# Patient Record
Sex: MALE | Race: ASIAN | Employment: FULL TIME | URBAN - METROPOLITAN AREA
[De-identification: names, ages, dates, MRNs, and addresses within clinical notes are randomized per-mention and may not be internally consistent; named-entity substitution may affect disease eponyms.]

---

## 2018-04-27 ENCOUNTER — HOSPITAL ENCOUNTER (OUTPATIENT)
Dept: GENERAL RADIOLOGY | Age: 43
Discharge: HOME | End: 2018-04-27
Payer: COMMERCIAL

## 2018-04-27 DIAGNOSIS — I10 ESSENTIAL HYPERTENSION: ICD-10-CM

## 2018-04-27 PROCEDURE — 71046 X-RAY EXAM CHEST 2 VIEWS: CPT

## 2019-10-11 ENCOUNTER — HOSPITAL ENCOUNTER (OUTPATIENT)
Dept: GENERAL RADIOLOGY | Age: 44
Discharge: HOME | End: 2019-10-11
Payer: COMMERCIAL

## 2019-10-11 DIAGNOSIS — I10 ESSENTIAL HYPERTENSION: ICD-10-CM

## 2019-10-11 PROCEDURE — 71046 X-RAY EXAM CHEST 2 VIEWS: CPT

## 2019-11-25 ENCOUNTER — OFFICE VISIT (OUTPATIENT)
Dept: FAMILY MEDICINE CLINIC | Age: 44
End: 2019-11-25

## 2019-11-25 VITALS
HEIGHT: 71 IN | OXYGEN SATURATION: 98 % | HEART RATE: 80 BPM | RESPIRATION RATE: 18 BRPM | SYSTOLIC BLOOD PRESSURE: 136 MMHG | DIASTOLIC BLOOD PRESSURE: 85 MMHG | WEIGHT: 196 LBS | BODY MASS INDEX: 27.44 KG/M2 | TEMPERATURE: 97.4 F

## 2019-11-25 DIAGNOSIS — G57.11 MERALGIA PARESTHETICA OF RIGHT SIDE: ICD-10-CM

## 2019-11-25 DIAGNOSIS — R73.03 PREDIABETES: ICD-10-CM

## 2019-11-25 DIAGNOSIS — Z76.89 ENCOUNTER TO ESTABLISH CARE WITH NEW DOCTOR: ICD-10-CM

## 2019-11-25 DIAGNOSIS — Z82.49 FAMILY HISTORY OF EARLY CAD: ICD-10-CM

## 2019-11-25 DIAGNOSIS — I10 ESSENTIAL HYPERTENSION: Primary | ICD-10-CM

## 2019-11-25 DIAGNOSIS — E66.3 OVER WEIGHT: ICD-10-CM

## 2019-11-25 RX ORDER — METFORMIN HYDROCHLORIDE 500 MG/1
500 TABLET ORAL
COMMUNITY
Start: 2019-08-29 | End: 2019-11-25 | Stop reason: SDUPTHER

## 2019-11-25 RX ORDER — METFORMIN HYDROCHLORIDE 500 MG/1
500 TABLET ORAL 2 TIMES DAILY WITH MEALS
Qty: 180 TAB | Refills: 1 | Status: SHIPPED | OUTPATIENT
Start: 2019-11-25 | End: 2020-02-28 | Stop reason: ALTCHOICE

## 2019-11-25 RX ORDER — LISINOPRIL 10 MG/1
TABLET ORAL
COMMUNITY
Start: 2019-11-08 | End: 2019-11-25 | Stop reason: SDUPTHER

## 2019-11-25 RX ORDER — LISINOPRIL 10 MG/1
10 TABLET ORAL DAILY
Qty: 90 TAB | Refills: 1 | Status: SHIPPED | OUTPATIENT
Start: 2019-11-25 | End: 2020-10-26

## 2019-11-25 NOTE — PROGRESS NOTES
Chief Complaint   Patient presents with    New Patient     Patient is in the office today to establish care with new provider. Patient also will need refills on current medications. 1. Have you been to the ER, urgent care clinic since your last visit? Hospitalized since your last visit? No    2. Have you seen or consulted any other health care providers outside of the [facility] since your last visit? Include any pap smears or colon screening.  No

## 2019-11-25 NOTE — PROGRESS NOTES
HISTORY OF PRESENT ILLNESS  Oriana Mello is a 40 y.o. male. He is a new patient and is here to establish care. Previous followed by Dr Julian Kay. The following sections were reviewed & updated as appropriate: PMH, PL, PSH, and SH. PMH significant for HTN, DM,family h/o CAD  Records from previous PCP were requested  He just had complete physical blood work done 2 months ago  HPI  Cardiovascular Review  The patient has hypertension and family h/o CAD. He reports taking medications as instructed, no medication side effects noted, no chest pain on exertion, no dyspnea on exertion, no swelling of ankles, no orthostatic dizziness or lightheadedness, no orthopnea or paroxysmal nocturnal dyspnea, no palpitations, no muscle aches or pain. Diet and Lifestyle: generally follows a low fat low cholesterol diet, generally follows a low sodium diet, exercises sporadically, nonsmoker. Lab review: no lab studies available for review at time of visit. He had [facility] stress test [facility] few months ago. Not sure about result   Medicines: Lisinopril 10 mg    Endocrine Review  He is seen for diabetes. Since last visit he reports: no polyuria or polydipsia, no chest pain, dyspnea or TIA's, no unusual visual symptoms, no hypoglycemia, no medication side effects noted. home sugar s not performed. He reports medication compliance: compliant all of the time. Medication side effects: none. Diabetic diet compliance: compliant all of the time. Lab review: no lab studies available for review at time of visit. He is on Metformin 500 mg daily    Review of Systems   Constitutional: Negative for chills, fever and malaise/fatigue. HENT: Negative for congestion, ear pain, sore throat and tinnitus. Eyes: Negative for blurred vision, double vision, pain and discharge. Respiratory: Negative for cough, shortness of breath and wheezing.     Cardiovascular: Negative for chest pain, palpitations and leg swelling. Gastrointestinal: Negative for abdominal pain, blood in stool, constipation, diarrhea, nausea and vomiting. Genitourinary: Negative for dysuria, frequency, hematuria and urgency. Musculoskeletal: Negative for back pain, joint pain and myalgias. Skin: Negative for rash. Neurological: Negative for dizziness, tremors, seizures and headaches. Numbness right thigh   Endo/Heme/Allergies: Negative for polydipsia. Does not bruise/bleed easily. Psychiatric/Behavioral: Negative for depression and substance abuse. The patient is not nervous/anxious. Physical Exam  Vitals signs and nursing note reviewed. Constitutional:       Appearance: He is well-developed. He is not diaphoretic. HENT:      Head: Normocephalic and atraumatic. Right Ear: External ear normal.      Mouth/Throat:      Pharynx: No oropharyngeal exudate. Eyes:      General: No scleral icterus. Conjunctiva/sclera: Conjunctivae normal.      Pupils: Pupils are equal, round, and reactive to light. Neck:      Musculoskeletal: Normal range of motion and neck supple. Thyroid: No thyromegaly. Vascular: No JVD. Cardiovascular:      Rate and Rhythm: Normal rate and regular rhythm. Heart sounds: Normal heart sounds. No murmur. Pulmonary:      Effort: Pulmonary effort is normal.      Breath sounds: Normal breath sounds. No wheezing. Abdominal:      General: Bowel sounds are normal. There is no distension. Palpations: Abdomen is soft. There is no mass. Musculoskeletal: Normal range of motion. General: No tenderness. Lymphadenopathy:      Cervical: No cervical adenopathy. Skin:     General: Skin is warm and dry. Findings: No rash. Neurological:      Mental Status: He is alert and oriented to person, place, and time. Cranial Nerves: No cranial nerve deficit. Deep Tendon Reflexes: Reflexes are normal and symmetric.  Reflexes normal.         ASSESSMENT and PLAN  Diagnoses and all orders for this visit:    1. Essential hypertension  -     lisinopril (PRINIVIL, ZESTRIL) 10 mg tablet; Take 1 Tab by mouth daily. 2. Family history of early CAD    3. Over weight  Assessment & Plan:  Discussed abnormal weight, ideal BMI and importance of diet and exercise to loose weight. Referral for exercise program was offered. Printed information about diet and lifestyle modification provided. Orders:  -     metFORMIN (GLUCOPHAGE) 500 mg tablet; Take 1 Tab by mouth two (2) times daily (with meals). 4. Prediabetes  -     metFORMIN (GLUCOPHAGE) 500 mg tablet; Take 1 Tab by mouth two (2) times daily (with meals). 5. Encounter to establish care with new doctor    6. Meralgia paresthetica of right side    Will get records from previous PCP  Refilled his meds  I have reviewed diabetes in detail with the patient today. All questions have been answered to his satisfaction. We have discussed the following concepts: diabetic diet discussed in detail, written exchange diet given, low cholesterol diet, weight control and daily exercise discussed, home glucose monitoring emphasized, all medications, side effects and compliance discussed carefully and annual eye examinations at Ophthalmology discussed. Discussed lifestyle issues and health guidance given  Patient was given an after visit summary which includes diagnoses, vital signs, current medications, instructions and references & authorized prescriptions . Results of labs will be conveyed to patient, once available. Pt verbalized instructions I provided and expressed understanding of discussion that was held today. Follow-up and Dispositions    · Return in about 3 months (around 2/25/2020) for fasting, follow up.

## 2019-11-25 NOTE — PATIENT INSTRUCTIONS

## 2020-02-26 ENCOUNTER — OFFICE VISIT (OUTPATIENT)
Dept: FAMILY MEDICINE CLINIC | Age: 45
End: 2020-02-26

## 2020-02-26 VITALS
HEART RATE: 73 BPM | HEIGHT: 71 IN | RESPIRATION RATE: 16 BRPM | SYSTOLIC BLOOD PRESSURE: 120 MMHG | WEIGHT: 192 LBS | OXYGEN SATURATION: 99 % | DIASTOLIC BLOOD PRESSURE: 70 MMHG | BODY MASS INDEX: 26.88 KG/M2 | TEMPERATURE: 98.8 F

## 2020-02-26 DIAGNOSIS — E66.3 OVER WEIGHT: ICD-10-CM

## 2020-02-26 DIAGNOSIS — I10 ESSENTIAL HYPERTENSION: ICD-10-CM

## 2020-02-26 DIAGNOSIS — E11.9 CONTROLLED TYPE 2 DIABETES MELLITUS WITHOUT COMPLICATION, WITHOUT LONG-TERM CURRENT USE OF INSULIN (HCC): Primary | ICD-10-CM

## 2020-02-26 DIAGNOSIS — Z82.49 FAMILY HISTORY OF EARLY CAD: ICD-10-CM

## 2020-02-26 DIAGNOSIS — E78.5 DYSLIPIDEMIA, GOAL LDL BELOW 100: ICD-10-CM

## 2020-02-26 NOTE — PROGRESS NOTES
HISTORY OF PRESENT ILLNESS  Caren Gitelman is a 40 y.o. male. Seen for 3 months follow up on his visit to establish care . Meanwhile records from previous PCP were received and reviewed . H/o DM, HTN,dyslipidemia. His last A1C was 7.0 in 10/2019  Strong family h/o CAD . Had stress echo with [facility]  Endocrine Review  He is seen for diabetes. Since last visit he reports: no polyuria or polydipsia, no chest pain, dyspnea or TIA's, no numbness, tingling or pain in extremities, no unusual visual symptoms, no hypoglycemia, no medication side effects noted, weight has decreased, no significant changes. Testing: is not performed. He reports medication compliance: taking Metformin one time only. Medication side effects: none. Diabetic diet compliance: compliant all of the time. Lab review: records from previous PCP showing A1C of 7.0. Cardiovascular Review  The patient has hypertension, hyperlipidemia and Family h/o CAD. He reports taking medications as instructed, no medication side effects noted, patient does not perform home BP monitoring, no chest pain on exertion, no dyspnea on exertion, no orthostatic dizziness or lightheadedness, no orthopnea or paroxysmal nocturnal dyspnea, no palpitations, no intermittent claudication symptoms. Diet and Lifestyle: generally follows a low fat low cholesterol diet, generally follows a low sodium diet, exercises regularly, nonsmoker. Lab review: no lab studies available for review at time of visit. Medicines: Lisinopril 10 mg        Review of Systems   Constitutional: Negative for chills, fever and malaise/fatigue. HENT: Negative for congestion, ear pain, sore throat and tinnitus. Eyes: Negative for blurred vision, double vision, pain and discharge. Respiratory: Negative for cough, shortness of breath and wheezing. Cardiovascular: Negative for chest pain, palpitations and leg swelling.    Gastrointestinal: Negative for abdominal pain, blood in stool, constipation, diarrhea, nausea and vomiting. Genitourinary: Negative for dysuria, frequency, hematuria and urgency. Musculoskeletal: Negative for back pain, joint pain and myalgias. Skin: Negative for rash. Neurological: Negative for dizziness, tremors, seizures and headaches. Endo/Heme/Allergies: Negative for polydipsia. Does not bruise/bleed easily. Psychiatric/Behavioral: Negative for depression and substance abuse. The patient is not nervous/anxious. Physical Exam  Vitals signs and nursing note reviewed. Constitutional:       Appearance: He is well-developed. He is not diaphoretic. HENT:      Head: Normocephalic and atraumatic. Right Ear: External ear normal.      Mouth/Throat:      Pharynx: No oropharyngeal exudate. Eyes:      General: No scleral icterus. Conjunctiva/sclera: Conjunctivae normal.      Pupils: Pupils are equal, round, and reactive to light. Neck:      Musculoskeletal: Normal range of motion and neck supple. Thyroid: No thyromegaly. Vascular: No JVD. Cardiovascular:      Rate and Rhythm: Normal rate and regular rhythm. Heart sounds: Normal heart sounds. No murmur. Pulmonary:      Effort: Pulmonary effort is normal.      Breath sounds: Normal breath sounds. No wheezing. Abdominal:      General: Bowel sounds are normal. There is no distension. Palpations: Abdomen is soft. There is no mass. Musculoskeletal: Normal range of motion. General: No tenderness. Comments: Feet:warm, good capillary refill, no trophic changes or ulcerative lesions, normal DP and PT pulses and normal sensory exam     Lymphadenopathy:      Cervical: No cervical adenopathy. Skin:     General: Skin is warm and dry. Findings: No rash. Neurological:      Mental Status: He is alert and oriented to person, place, and time. Cranial Nerves: No cranial nerve deficit.       Deep Tendon Reflexes: Reflexes are normal and symmetric. Reflexes normal.         ASSESSMENT and PLAN  Diagnoses and all orders for this visit:    1. Controlled type 2 diabetes mellitus without complication, without long-term current use of insulin ([facility] [address]  Assessment & Plan:  Well Controlled, based on history, physical exam and review of pertinent labs, studies and medications; meds reconciled; continue current treatment plan, lifestyle modifications recommended, medication compliance emphasized. Key Antihyperglycemic Medications             metFORMIN (GLUCOPHAGE) 500 mg tablet (Taking) Take 1 Tab by mouth two (2) times daily (with meals). Other Key Diabetic Medications             lisinopril (PRINIVIL, ZESTRIL) 10 mg tablet (Taking) Take 1 Tab by mouth daily. No results found for: HBA1C, [id number], [id number], GLU, CREA, CREAPOC, CREATEXT, MALBUR, MCALPOCT, MCACRPOC, MALBCRRATEXT, MCACR, MCAU, MCAU2, MALBEXT, CHOL, CHOLPOCT, HDL, HDLPOC, LDLC, LDL, LDLCEXT, LDLCPOC, TRIGL, TGLPOCT, [id number]  Diabetic Foot and Eye Exam  Status   Topic Date Due    Diabetic Foot Care  06/20/1985    Eye Exam  06/20/1985       Orders:  -      DIABETES FOOT EXAM  -     METABOLIC PANEL, COMPREHENSIVE  -     HEMOGLOBIN A1C WITH EAG  -     MICROALBUMIN, UR, RAND W/ MICROALB/CREAT RATIO  -     REFERRAL TO OPHTHALMOLOGY    2. Essential hypertension  -     METABOLIC PANEL, COMPREHENSIVE  -     MICROALBUMIN, UR, RAND W/ MICROALB/CREAT RATIO    3. Family history of early CAD    3. Over weight  Assessment & Plan:  Discussed abnormal weight, ideal BMI and importance of diet and exercise to loose weight. Referral for exercise program was offered. Printed information about diet and lifestyle modification provided.          5. Dyslipidemia, goal LDL below 787  -     METABOLIC PANEL, COMPREHENSIVE  -     LIPID PANEL      Discussed lifestyle issues and health guidance given  Patient was given an after visit summary which includes diagnoses, vital signs, current medications, instructions and references & authorized prescriptions . Results of labs will be conveyed to patient, once available. Pt verbalized instructions I provided and expressed understanding of discussion that was held today. Follow-up and Dispositions    · Return in about 3 months (around 5/26/2020) for fasting, follow up.

## 2020-02-26 NOTE — PATIENT INSTRUCTIONS
Counting Carbohydrates: Care Instructions Your Care Instructions You don't have to eat special foods when you have diabetes. You just have to be careful to eat healthy foods. Carbohydrates (carbs) raise blood sugar higher and quicker than any other nutrient. Carbs are found in desserts, breads and cereals, and fruit. They're also in starchy vegetables. These include potatoes, corn, and grains such as rice and pasta. Carbs are also in milk and yogurt. The more carbs you eat at one time, the higher your blood sugar will rise. Spreading carbs all through the day helps keep your blood sugar levels within your target range. Counting carbs is one of the best ways to keep your blood sugar under control. If you use insulin, counting carbs helps you match the right amount of insulin to the number of grams of carbs in a meal. Then you can change your diet and insulin dose as needed. Testing your blood sugar several times a day can help you learn how carbs affect your blood sugar. A registered dietitian or certified diabetes educator can help you plan meals and snacks. Follow-up care is a key part of your treatment and safety. Be sure to make and go to all appointments, and call your doctor if you are having problems. It's also a good idea to know your test results and keep a list of the medicines you take. How can you care for yourself at home? Know your daily amount of carbohydrates Your daily amount depends on several things, such as your weight, how active you are, which diabetes medicines you take, and what your goals are for your blood sugar levels. A registered dietitian or certified diabetes educator can help you plan how many carbs to include in each meal and snack. For most adults, a guideline for the daily amount of carbs is: · 45 to 60 grams at each meal. That's about the same as 3 to 4 carbohydrate servings. · 15 to 20 grams at each snack. That's about the same as 1 carbohydrate serving. Count carbs Counting carbs lets you know how much rapid-acting insulin to take before you eat. If you use an insulin pump, you get a constant rate of insulin during the day. So the pump must be programmed at meals. This gives you extra insulin to cover the rise in blood sugar after meals. If you take insulin: 
· Learn your own insulin-to-carb ratio. You and your diabetes health professional will figure out the ratio. You can do this by testing your blood sugar after meals. For example, you may need a certain amount of insulin for every 15 grams of carbs. · Add up the carb grams in a meal. Then you can figure out how many units of insulin to take based on your insulin-to-carb ratio. · Exercise lowers blood sugar. You can use less insulin than you would if you were not doing exercise. Keep in mind that timing matters. If you exercise within 1 hour after a meal, your body may need less insulin for that meal than it would if you exercised 3 hours after the meal. Test your blood sugar to find out how exercise affects your need for insulin. If you do or don't take insulin: 
· Look at labels on packaged foods. This can tell you how many carbs are in a serving. You can also use guides from the American Diabetes Association. · Be aware of portions, or serving sizes. If a package has two servings and you eat the whole package, you need to double the number of grams of carbohydrate listed for one serving. · Protein, fat, and fiber do not raise blood sugar as much as carbs do. If you eat a lot of these nutrients in a meal, your blood sugar will rise more slowly than it would otherwise. Eat from all food groups · Eat at least three meals a day. · Plan meals to include food from all the food groups. The food groups include grains, fruits, dairy, proteins, and vegetables. · Talk to your dietitian or diabetes educator about ways to add limited amounts of sweets into your meal plan. · If you drink alcohol, talk to your doctor. It may not be recommended when you are taking certain diabetes medicines. Where can you learn more? Go to http://simeon-gibran.info/. Enter O736 in the search box to learn more about \"Counting Carbohydrates: Care Instructions. \" Current as of: April 16, 2019 Content Version: 12.2 © 4867-4117 [employer]. Care instructions adapted under license by [employer] (which disclaims liability or warranty for this information). If you have questions about a medical condition or this instruction, always ask your healthcare professional. [place] [address] any warranty or liability for your use of this information.

## 2020-02-26 NOTE — ASSESSMENT & PLAN NOTE
Well Controlled, based on history, physical exam and review of pertinent labs, studies and medications; meds reconciled; continue current treatment plan, lifestyle modifications recommended, medication compliance emphasized. Key Antihyperglycemic Medications             metFORMIN (GLUCOPHAGE) 500 mg tablet (Taking) Take 1 Tab by mouth two (2) times daily (with meals). Other Key Diabetic Medications             lisinopril (PRINIVIL, ZESTRIL) 10 mg tablet (Taking) Take 1 Tab by mouth daily.         No results found for: HBA1C, [id number], [id number], GLU, CREA, CREAPOC, CREATEXT, MALBUR, MCALPOCT, MCACRPOC, MALBCRRATEXT, MCACR, MCAU, MCAU2, MALBEXT, CHOL, CHOLPOCT, HDL, HDLPOC, LDLC, LDL, LDLCEXT, LDLCPOC, TRIGL, TGLPOCT, [id number]  Diabetic Foot and Eye Exam HM Status   Topic Date Due    Diabetic Foot Care  06/20/1985    Eye Exam  06/20/1985

## 2020-02-26 NOTE — PROGRESS NOTES
Chief Complaint   Patient presents with    Hypertension     follow up      1. Have you been to the ER, urgent care clinic since your last visit? Hospitalized since your last visit? No    2. Have you seen or consulted any other health care providers outside of the [address] [place] [address] since your last visit? Include any pap smears or colon screening.  No

## 2020-02-27 LAB
ALBUMIN SERPL-MCNC: 4.8 G/DL (ref 4–5)
ALBUMIN/CREAT UR: 9 MG/G CREAT (ref 0–29)
ALBUMIN/GLOB SERPL: 1.8 {RATIO} (ref 1.2–2.2)
ALP SERPL-CCNC: 71 IU/L (ref 39–117)
ALT SERPL-CCNC: 22 IU/L (ref 0–44)
AST SERPL-CCNC: 17 IU/L (ref 0–40)
BILIRUB SERPL-MCNC: 0.9 MG/DL (ref 0–1.2)
BUN SERPL-MCNC: 13 MG/DL (ref 6–24)
BUN/CREAT SERPL: 16 (ref 9–20)
CALCIUM SERPL-MCNC: 9.2 MG/DL (ref 8.7–10.2)
CHLORIDE SERPL-SCNC: 99 MMOL/L (ref 96–106)
CHOLEST SERPL-MCNC: 200 MG/DL (ref 100–199)
CO2 SERPL-SCNC: 25 MMOL/L (ref 20–29)
CREAT SERPL-MCNC: 0.83 MG/DL (ref 0.76–1.27)
CREAT UR-MCNC: 174.6 MG/DL
EST. AVERAGE GLUCOSE BLD GHB EST-MCNC: 243 MG/DL
GLOBULIN SER CALC-MCNC: 2.7 G/DL (ref 1.5–4.5)
GLUCOSE SERPL-MCNC: 201 MG/DL (ref 65–99)
HBA1C MFR BLD: 10.1 % (ref 4.8–5.6)
HDLC SERPL-MCNC: 47 MG/DL
INTERPRETATION, 910389: NORMAL
LDLC SERPL CALC-MCNC: 121 MG/DL (ref 0–99)
MICROALBUMIN UR-MCNC: 15.8 UG/ML
POTASSIUM SERPL-SCNC: 3.9 MMOL/L (ref 3.5–5.2)
PROT SERPL-MCNC: 7.5 G/DL (ref 6–8.5)
SODIUM SERPL-SCNC: 140 MMOL/L (ref 134–144)
TRIGL SERPL-MCNC: 162 MG/DL (ref 0–149)
VLDLC SERPL CALC-MCNC: 32 MG/DL (ref 5–40)

## 2020-02-28 DIAGNOSIS — E11.9 CONTROLLED TYPE 2 DIABETES MELLITUS WITHOUT COMPLICATION, WITHOUT LONG-TERM CURRENT USE OF INSULIN (HCC): Primary | ICD-10-CM

## 2020-02-28 DIAGNOSIS — E78.5 DYSLIPIDEMIA, GOAL LDL BELOW 100: ICD-10-CM

## 2020-02-28 DIAGNOSIS — Z82.49 FAMILY HISTORY OF EARLY CAD: ICD-10-CM

## 2020-02-28 RX ORDER — ATORVASTATIN CALCIUM 10 MG/1
10 TABLET, FILM COATED ORAL
Qty: 30 TAB | Refills: 2 | Status: SHIPPED | OUTPATIENT
Start: 2020-02-28 | End: 2020-06-11

## 2020-02-28 NOTE — PROGRESS NOTES
Please inform patient,  A1C is very high ( 10.1) and average sugar in 200s, c/w uncontrolled diabetes. I am changing his Metformin to Janumet and to take strictly two times . Need to follow up in a month to check on number again  Cholesterol results also high , , goal for him is < 100 due to diabetes and strong family history of CAD.  Will start on low dose of statin,to take at night  Urine is negative for protein  Please schedule him for 1 month follow up on diabetes  Rx sent to pharmacy  thanks

## 2020-03-02 NOTE — PROGRESS NOTES
Call placed to patient. Name and  verified. Reviewed recent lab results with patient. Advised to stop metformin and start Janumet. Advised new RX for statin called to pharmacy.  Scheduled one month follow up

## 2020-03-04 ENCOUNTER — DOCUMENTATION ONLY (OUTPATIENT)
Dept: FAMILY MEDICINE CLINIC | Age: 45
End: 2020-03-04

## 2020-03-05 ENCOUNTER — DOCUMENTATION ONLY (OUTPATIENT)
Dept: FAMILY MEDICINE CLINIC | Age: 45
End: 2020-03-05

## 2020-03-06 DIAGNOSIS — E11.9 CONTROLLED TYPE 2 DIABETES MELLITUS WITHOUT COMPLICATION, WITHOUT LONG-TERM CURRENT USE OF INSULIN (HCC): Primary | ICD-10-CM

## 2020-03-06 NOTE — PROGRESS NOTES
Received denial for Janumet from insurance, requesting to change to preferred one either Jentadueto or Rex Marie

## 2020-03-24 ENCOUNTER — TELEPHONE (OUTPATIENT)
Dept: FAMILY MEDICINE CLINIC | Age: 45
End: 2020-03-24

## 2020-03-24 DIAGNOSIS — E11.9 CONTROLLED TYPE 2 DIABETES MELLITUS WITHOUT COMPLICATION, WITHOUT LONG-TERM CURRENT USE OF INSULIN (HCC): Primary | ICD-10-CM

## 2020-03-24 RX ORDER — METFORMIN HYDROCHLORIDE 1000 MG/1
1000 TABLET ORAL 2 TIMES DAILY WITH MEALS
Qty: 60 TAB | Refills: 3 | Status: SHIPPED | OUTPATIENT
Start: 2020-03-24 | End: 2020-05-04 | Stop reason: SDUPTHER

## 2020-03-24 NOTE — TELEPHONE ENCOUNTER
Outbound call to patient. Name and  verified. Advised patient medication was sent as separate prescriptions he was appreciative of call

## 2020-03-24 NOTE — TELEPHONE ENCOUNTER
Pt is calling to check if Dr. Pam Gunn can change his current medication linagliptin-metFORMIN (Anastasia Jonatan) 2.5-1,000 mg per tablet to a generic option as his current insurance is not covering this very well. [facility]:  [phone]

## 2020-04-13 ENCOUNTER — TELEPHONE (OUTPATIENT)
Dept: FAMILY MEDICINE CLINIC | Age: 45
End: 2020-04-13

## 2020-04-13 DIAGNOSIS — E11.9 CONTROLLED TYPE 2 DIABETES MELLITUS WITHOUT COMPLICATION, WITHOUT LONG-TERM CURRENT USE OF INSULIN (HCC): Primary | ICD-10-CM

## 2020-04-13 RX ORDER — GLIPIZIDE 5 MG/1
5 TABLET ORAL
Qty: 60 TAB | Refills: 4 | Status: SHIPPED | OUTPATIENT
Start: 2020-04-13 | End: 2020-11-30 | Stop reason: ALTCHOICE

## 2020-04-13 NOTE — TELEPHONE ENCOUNTER
Was changed to [facility]) as per insurance recommendation. Will switch to Glipizide ,to take before meals. thanks

## 2020-04-13 NOTE — TELEPHONE ENCOUNTER
Patient is asking for a cheaper alternative  for medication Tradjenta 5mg that was sent to [facility] on 3/24/2020 #30 with 3 refill 1 tab daily with breakfast. Patient states cost $450. Please review and send a new prescription to [facility] on file if appropriate. Thank you.

## 2020-04-21 ENCOUNTER — TELEPHONE (OUTPATIENT)
Dept: FAMILY MEDICINE CLINIC | Age: 45
End: 2020-04-21

## 2020-04-21 NOTE — TELEPHONE ENCOUNTER
Chief Complaint Patient presents with  Prior [employer] JANUVIA 25 MG TABLET COVERMYMEDS RESPONSE:  Your request is pending review. You may contact the Prior Authorization Department at [phone] for further questions. Penny Newman, TANESHA Contacted prior authorization department, medication Denied. Penny Newman LPN In reviewing patient's record Tradjenta medication changed due to insurance not covering or high copay. Ayla Mccallum, TANESHA]

## 2020-05-04 ENCOUNTER — VIRTUAL VISIT (OUTPATIENT)
Dept: FAMILY MEDICINE CLINIC | Age: 45
End: 2020-05-04

## 2020-05-04 VITALS — BODY MASS INDEX: 26.78 KG/M2 | HEIGHT: 71 IN

## 2020-05-04 DIAGNOSIS — Z82.49 FAMILY HISTORY OF EARLY CAD: Primary | ICD-10-CM

## 2020-05-04 DIAGNOSIS — E11.9 CONTROLLED TYPE 2 DIABETES MELLITUS WITHOUT COMPLICATION, WITHOUT LONG-TERM CURRENT USE OF INSULIN (HCC): ICD-10-CM

## 2020-05-04 DIAGNOSIS — E78.5 DYSLIPIDEMIA, GOAL LDL BELOW 100: ICD-10-CM

## 2020-05-04 RX ORDER — METFORMIN HYDROCHLORIDE 1000 MG/1
1000 TABLET ORAL 2 TIMES DAILY WITH MEALS
Qty: 60 TAB | Refills: 3 | Status: SHIPPED | OUTPATIENT
Start: 2020-05-04 | End: 2021-05-07

## 2020-05-04 NOTE — PROGRESS NOTES
Elke Gregory is a 40 y.o. male who was seen by synchronous (real-time) audio-video technology on 5/4/2020. Consent: Elke Gregory, who was seen by synchronous (real-time) audio-video technology, and/or his healthcare decision maker, is aware that this patient-initiated, Telehealth encounter on 5/4/2020 is a billable service, with coverage as determined by his insurance carrier. He is aware that he may receive a bill and has provided verbal consent to proceed: Yes. Assessment & Plan:   Diagnoses and all orders for this visit:    1. Family history of early CAD  -     LIPID PANEL    2. Controlled type 2 diabetes mellitus without complication, without long-term current use of insulin (HCC)  -     metFORMIN (GLUCOPHAGE) 1,000 mg tablet; Take 1 Tab by mouth two (2) times daily (with meals). -     METABOLIC PANEL, COMPREHENSIVE  -     HEMOGLOBIN A1C WITH EAG  -     LIPID PANEL    3. Dyslipidemia, goal LDL below 777  -     METABOLIC PANEL, COMPREHENSIVE  -     LIPID PANEL      The complexity of medical decision making for this visit is moderate       I spent at least 40 minutes on this visit with this established patient. ([id number])    I have reviewed diabetes in detail with the patient today. All questions have been answered to his satisfaction. We have discussed the following concepts: diabetic diet discussed in detail, written exchange diet given, low cholesterol diet, weight control and daily exercise discussed, home glucose monitoring emphasized, all medications, side effects and compliance discussed carefully, foot care discussed and Podiatry visits discussed, long term diabetic complications discussed and labs immediately prior to next visit. Subjective:   Elke Gregory is a 40 y.o. male who was seen for Follow-up (1 month)      Endocrine Review  He is seen for diabetes.   Since last visit he reports: no polyuria or polydipsia, no chest pain, dyspnea or TIA's, no numbness, tingling or pain in extremities, no unusual visual symptoms, no hypoglycemia, no medication side effects noted, weight has decreased, no significant changes. Testing: is not performed. He reports medication compliance: taking Metformin one time only. Medication side effects: none. Diabetic diet compliance: compliant all of the time. Lab review:   Lab Results   Component Value Date/Time    Hemoglobin A1c 10.1 (H) 02/26/2020 09:28 AM      His Metformin dose was changed to 1 gm bid and Glipizide 5 mg bid was added. He is still taking Metformin 500 mg bid and Glipizide one time. Insurance has declined most of other medicines so far.         Cardiovascular Review  The patient has hypertension, hyperlipidemia and Family h/o CAD. He reports taking medications as instructed, no medication side effects noted, patient does not perform home BP monitoring, no chest pain on exertion, no dyspnea on exertion, no orthostatic dizziness or lightheadedness, no orthopnea or paroxysmal nocturnal dyspnea, no palpitations, no intermittent claudication symptoms. Diet and Lifestyle: generally follows a low fat low cholesterol diet, generally follows a low sodium diet, exercises regularly, nonsmoker. Lab review: no lab studies available for review at time of visit. Medicines: Lisinopril 10 mg and Atorvastatin 10 mg was added on last visit    Prior to Admission medications    Medication Sig Start Date End Date Taking? Authorizing Provider   metFORMIN (GLUCOPHAGE) 1,000 mg tablet Take 1 Tab by mouth two (2) times daily (with meals). 5/4/20  Yes Misha Hidalgo MD   glipiZIDE (GLUCOTROL) 5 mg tablet Take 1 Tab by mouth Before breakfast and dinner. Patient taking differently: Take 5 mg by mouth daily. 4/13/20  Yes Misha Hidalgo MD   atorvastatin (LIPITOR) 10 mg tablet Take 1 Tab by mouth nightly. 2/28/20  Yes Misha Hidalgo MD   lisinopril (PRINIVIL, ZESTRIL) 10 mg tablet Take 1 Tab by mouth daily.  11/25/19  Yes Misha Hidalgo MD     No Known Allergies    Patient Active Problem List    Diagnosis Date Noted    Controlled type 2 diabetes mellitus without complication, without long-term current use of insulin ([facility] 11/25/2019    Encounter to establish care with new doctor 11/25/2019    Over weight 11/25/2019    Family history of early CAD 11/25/2019    Meralgia paresthetica of right side 11/25/2019     Current Outpatient Medications   Medication Sig Dispense Refill    metFORMIN (GLUCOPHAGE) 1,000 mg tablet Take 1 Tab by mouth two (2) times daily (with meals). 60 Tab 3    glipiZIDE (GLUCOTROL) 5 mg tablet Take 1 Tab by mouth Before breakfast and dinner. (Patient taking differently: Take 5 mg by mouth daily.) 60 Tab 4    atorvastatin (LIPITOR) 10 mg tablet Take 1 Tab by mouth nightly. 30 Tab 2    lisinopril (PRINIVIL, ZESTRIL) 10 mg tablet Take 1 Tab by mouth daily. 90 Tab 1     No Known Allergies  Past Medical History:   Diagnosis Date    Diabetes ([facility]     Hypertension      History reviewed. No pertinent surgical history. Family History   Problem Relation Age of Onset    Diabetes Father     Heart Disease Father     Heart Disease Mother        Review of Systems   Constitutional: Negative for chills, fever and malaise/fatigue. HENT: Negative for congestion, ear pain, sore throat and tinnitus. Eyes: Negative for blurred vision, double vision, pain and discharge. Respiratory: Negative for cough, shortness of breath and wheezing. Cardiovascular: Negative for chest pain, palpitations and leg swelling. Gastrointestinal: Negative for abdominal pain, blood in stool, constipation, diarrhea, nausea and vomiting. Genitourinary: Negative for dysuria, frequency, hematuria and urgency. Musculoskeletal: Negative for back pain, joint pain and myalgias. Skin: Negative for rash. Neurological: Negative for dizziness, tremors, seizures and headaches. Endo/Heme/Allergies: Negative for polydipsia. Does not bruise/bleed easily. Psychiatric/Behavioral: Negative for depression and substance abuse. The patient is not nervous/anxious. Objective:   Vital Signs: (As obtained by patient/caregiver at home)  Visit Vitals  Ht 5' 11\" (1.803 m)   BMI 26.78 kg/m²        [INSTRUCTIONS:  \"[x]\" Indicates a positive item  \"[]\" Indicates a negative item  -- DELETE ALL ITEMS NOT EXAMINED]    Constitutional: [x] Appears well-developed and well-nourished [x] No apparent distress      [] Abnormal -     Mental status: [x] Alert and awake  [x] Oriented to person/place/time [x] Able to follow commands    [] Abnormal -     Eyes:   EOM    [x]  Normal    [] Abnormal -   Sclera  [x]  Normal    [] Abnormal -          Discharge [x]  None visible   [] Abnormal -     HENT: [x] Normocephalic, atraumatic  [] Abnormal -   [x] Mouth/Throat: Mucous membranes are moist    External Ears [x] Normal  [] Abnormal -    Neck: [x] No visualized mass [] Abnormal -     Pulmonary/Chest: [x] Respiratory effort normal   [x] No visualized signs of difficulty breathing or respiratory distress        [] Abnormal -      Musculoskeletal:   [x] Normal gait with no signs of ataxia         [x] Normal range of motion of neck        [] Abnormal -     Neurological:        [x] No Facial Asymmetry (Cranial nerve 7 motor function) (limited exam due to video visit)          [x] No gaze palsy        [] Abnormal -          Skin:        [x] No significant exanthematous lesions or discoloration noted on facial skin         [] Abnormal -            Psychiatric:       [x] Normal Affect [] Abnormal -        [x] No Hallucinations    Other pertinent observable physical exam findings:-        We discussed the expected course, resolution and complications of the diagnosis(es) in detail. Medication risks, benefits, costs, interactions, and alternatives were discussed as indicated. I advised him to contact the office if his condition worsens, changes or fails to improve as anticipated.  He expressed understanding with the diagnosis(es) and plan. Sumaya Norton is a 40 y.o. male who was evaluated by a video visit encounter for concerns as above. Patient identification was verified prior to start of the visit. A caregiver was present when appropriate. Due to this being a TeleHealth encounter (During NOYEW-14 public health emergency), evaluation of the following organ systems was limited: Vitals/Constitutional/EENT/Resp/CV/GI//MS/Neuro/Skin/Heme-Lymph-Imm. Pursuant to the emergency declaration under the [facility], [facility]5 waiver authority and the [employer] Act, this Virtual  Visit was conducted, with patient's (and/or legal guardian's) consent, to reduce the patient's risk of exposure to COVID-19 and provide necessary medical care. Services were provided through a video synchronous discussion virtually to substitute for in-person clinic visit. This service was provided through telehealth, between patient Sumaya Norton participating from home and Keshawn العلي MD from [facility]   .       Akiko Olivier MD

## 2020-05-04 NOTE — PROGRESS NOTES
Chief Complaint   Patient presents with    Follow-up     1 month     1. Have you been to the ER, urgent care clinic since your last visit? Hospitalized since your last visit? No    2. Have you seen or consulted any other health care providers outside of the [address] [place] [address] since your last visit? Include any pap smears or colon screening.  No

## 2020-05-11 ENCOUNTER — TELEPHONE (OUTPATIENT)
Dept: FAMILY MEDICINE CLINIC | Age: 45
End: 2020-05-11

## 2020-05-14 ENCOUNTER — TELEPHONE (OUTPATIENT)
Dept: FAMILY MEDICINE CLINIC | Age: 45
End: 2020-05-14

## 2020-05-14 NOTE — TELEPHONE ENCOUNTER
Called, spoke to pt. Two pt identifiers confirmed. Writer informed patient that his med Cristiano Mcginnis was not approval but we have a discount card that we can send to him and patient stated that I could. Sarah Mejia was DENIED for [facility] and fax to Pharmacy

## 2020-05-20 ENCOUNTER — TELEPHONE (OUTPATIENT)
Dept: FAMILY MEDICINE CLINIC | Age: 45
End: 2020-05-20

## 2020-05-20 NOTE — TELEPHONE ENCOUNTER
Outbound call placed to patient. No answer. Left message for patient to give us a call back regarding upcoming appointment. Patients appointment needs to be rescheduled or converted to Virtual visit if patient is willing due to COVID-19 pandemic.    PSR can reschedule appt as virtual

## 2020-06-04 NOTE — TELEPHONE ENCOUNTER
MD Shania Corrigan, Not sure what is current therapy for this patient. Not on current med list. Per note, we were sending a discount card to patient for [facility]. Pharmacy has run rx again with rejection per insurance. Attached the rx if needed and appropriate. Thanks, Edie Francisco Requested Prescriptions Pending Prescriptions Disp Refills  SITagliptin (JANUVIA) 25 mg tablet 30 Tab 5 Sig: Take 1 Tab by mouth daily (with breakfast).

## 2020-06-11 DIAGNOSIS — E78.5 DYSLIPIDEMIA, GOAL LDL BELOW 100: ICD-10-CM

## 2020-06-11 DIAGNOSIS — Z82.49 FAMILY HISTORY OF EARLY CAD: ICD-10-CM

## 2020-06-11 RX ORDER — ATORVASTATIN CALCIUM 10 MG/1
TABLET, FILM COATED ORAL
Qty: 30 TAB | Refills: 1 | Status: SHIPPED | OUTPATIENT
Start: 2020-06-11 | End: 2021-07-02

## 2020-10-26 DIAGNOSIS — I10 ESSENTIAL HYPERTENSION: ICD-10-CM

## 2020-10-26 RX ORDER — LISINOPRIL 10 MG/1
TABLET ORAL
Qty: 90 TAB | Refills: 0 | Status: SHIPPED | OUTPATIENT
Start: 2020-10-26 | End: 2021-02-01

## 2020-11-30 ENCOUNTER — OFFICE VISIT (OUTPATIENT)
Dept: FAMILY MEDICINE CLINIC | Age: 45
End: 2020-11-30
Payer: COMMERCIAL

## 2020-11-30 VITALS
HEIGHT: 71 IN | TEMPERATURE: 97.8 F | SYSTOLIC BLOOD PRESSURE: 128 MMHG | BODY MASS INDEX: 28.42 KG/M2 | DIASTOLIC BLOOD PRESSURE: 84 MMHG | OXYGEN SATURATION: 97 % | HEART RATE: 83 BPM | WEIGHT: 203 LBS | RESPIRATION RATE: 16 BRPM

## 2020-11-30 DIAGNOSIS — E11.9 CONTROLLED TYPE 2 DIABETES MELLITUS WITHOUT COMPLICATION, WITHOUT LONG-TERM CURRENT USE OF INSULIN (HCC): ICD-10-CM

## 2020-11-30 DIAGNOSIS — Z00.00 ROUTINE GENERAL MEDICAL EXAMINATION AT A HEALTH CARE FACILITY: Primary | ICD-10-CM

## 2020-11-30 DIAGNOSIS — Z23 ENCOUNTER FOR IMMUNIZATION: ICD-10-CM

## 2020-11-30 DIAGNOSIS — Z82.49 FAMILY HISTORY OF EARLY CAD: ICD-10-CM

## 2020-11-30 DIAGNOSIS — E11.9 CONTROLLED TYPE 2 DIABETES MELLITUS WITHOUT COMPLICATION, WITHOUT LONG-TERM CURRENT USE OF INSULIN (HCC): Primary | ICD-10-CM

## 2020-11-30 LAB
ALBUMIN SERPL-MCNC: 3.8 G/DL (ref 3.5–5)
ALBUMIN/GLOB SERPL: 1.1 {RATIO} (ref 1.1–2.2)
ALP SERPL-CCNC: 66 U/L (ref 45–117)
ALT SERPL-CCNC: 47 U/L (ref 12–78)
ANION GAP SERPL CALC-SCNC: 3 MMOL/L (ref 5–15)
APPEARANCE UR: CLEAR
AST SERPL-CCNC: 19 U/L (ref 15–37)
BACTERIA URNS QL MICRO: NEGATIVE /HPF
BASOPHILS # BLD: 0 K/UL (ref 0–0.1)
BASOPHILS NFR BLD: 1 % (ref 0–1)
BILIRUB SERPL-MCNC: 0.5 MG/DL (ref 0.2–1)
BILIRUB UR QL: NEGATIVE
BUN SERPL-MCNC: 12 MG/DL (ref 6–20)
BUN/CREAT SERPL: 14 (ref 12–20)
CALCIUM SERPL-MCNC: 8.3 MG/DL (ref 8.5–10.1)
CHLORIDE SERPL-SCNC: 105 MMOL/L (ref 97–108)
CHOLEST SERPL-MCNC: 172 MG/DL
CO2 SERPL-SCNC: 28 MMOL/L (ref 21–32)
COLOR UR: ABNORMAL
CREAT SERPL-MCNC: 0.86 MG/DL (ref 0.7–1.3)
CREAT UR-MCNC: 276 MG/DL
DIFFERENTIAL METHOD BLD: ABNORMAL
EOSINOPHIL # BLD: 0.2 K/UL (ref 0–0.4)
EOSINOPHIL NFR BLD: 4 % (ref 0–7)
EPITH CASTS URNS QL MICRO: ABNORMAL /LPF
ERYTHROCYTE [DISTWIDTH] IN BLOOD BY AUTOMATED COUNT: 13.3 % (ref 11.5–14.5)
EST. AVERAGE GLUCOSE BLD GHB EST-MCNC: 197 MG/DL
GLOBULIN SER CALC-MCNC: 3.5 G/DL (ref 2–4)
GLUCOSE SERPL-MCNC: 237 MG/DL (ref 65–100)
GLUCOSE UR STRIP.AUTO-MCNC: >1000 MG/DL
HBA1C MFR BLD: 8.5 % (ref 4–5.6)
HCT VFR BLD AUTO: 46.4 % (ref 36.6–50.3)
HDLC SERPL-MCNC: 43 MG/DL
HDLC SERPL: 4 {RATIO} (ref 0–5)
HGB BLD-MCNC: 15.1 G/DL (ref 12.1–17)
HGB UR QL STRIP: NEGATIVE
HYALINE CASTS URNS QL MICRO: ABNORMAL /LPF (ref 0–5)
IMM GRANULOCYTES # BLD AUTO: 0 K/UL (ref 0–0.04)
IMM GRANULOCYTES NFR BLD AUTO: 1 % (ref 0–0.5)
KETONES UR QL STRIP.AUTO: ABNORMAL MG/DL
LDLC SERPL CALC-MCNC: 106 MG/DL (ref 0–100)
LEUKOCYTE ESTERASE UR QL STRIP.AUTO: NEGATIVE
LIPID PROFILE,FLP: ABNORMAL
LYMPHOCYTES # BLD: 1.6 K/UL (ref 0.8–3.5)
LYMPHOCYTES NFR BLD: 32 % (ref 12–49)
MCH RBC QN AUTO: 28.9 PG (ref 26–34)
MCHC RBC AUTO-ENTMCNC: 32.5 G/DL (ref 30–36.5)
MCV RBC AUTO: 88.7 FL (ref 80–99)
MICROALBUMIN UR-MCNC: 6.02 MG/DL
MICROALBUMIN/CREAT UR-RTO: 22 MG/G (ref 0–30)
MONOCYTES # BLD: 0.5 K/UL (ref 0–1)
MONOCYTES NFR BLD: 9 % (ref 5–13)
NEUTS SEG # BLD: 2.7 K/UL (ref 1.8–8)
NEUTS SEG NFR BLD: 53 % (ref 32–75)
NITRITE UR QL STRIP.AUTO: NEGATIVE
NRBC # BLD: 0 K/UL (ref 0–0.01)
NRBC BLD-RTO: 0 PER 100 WBC
PH UR STRIP: 5.5 [PH] (ref 5–8)
PLATELET # BLD AUTO: 201 K/UL (ref 150–400)
PMV BLD AUTO: 11.1 FL (ref 8.9–12.9)
POTASSIUM SERPL-SCNC: 4.4 MMOL/L (ref 3.5–5.1)
PROT SERPL-MCNC: 7.3 G/DL (ref 6.4–8.2)
PROT UR STRIP-MCNC: ABNORMAL MG/DL
RBC # BLD AUTO: 5.23 M/UL (ref 4.1–5.7)
RBC #/AREA URNS HPF: ABNORMAL /HPF (ref 0–5)
SODIUM SERPL-SCNC: 136 MMOL/L (ref 136–145)
SP GR UR REFRACTOMETRY: >1.03
TRIGL SERPL-MCNC: 115 MG/DL (ref ?–150)
TSH SERPL DL<=0.05 MIU/L-ACNC: 2.19 UIU/ML (ref 0.36–3.74)
UROBILINOGEN UR QL STRIP.AUTO: 0.2 EU/DL (ref 0.2–1)
VLDLC SERPL CALC-MCNC: 23 MG/DL
WBC # BLD AUTO: 5 K/UL (ref 4.1–11.1)
WBC URNS QL MICRO: ABNORMAL /HPF (ref 0–4)

## 2020-11-30 PROCEDURE — 90471 IMMUNIZATION ADMIN: CPT

## 2020-11-30 PROCEDURE — 3052F HG A1C>EQUAL 8.0%<EQUAL 9.0%: CPT | Performed by: FAMILY MEDICINE

## 2020-11-30 PROCEDURE — 90732 PPSV23 VACC 2 YRS+ SUBQ/IM: CPT

## 2020-11-30 PROCEDURE — 99396 PREV VISIT EST AGE 40-64: CPT | Performed by: FAMILY MEDICINE

## 2020-11-30 RX ORDER — GLIMEPIRIDE 2 MG/1
2 TABLET ORAL
Qty: 30 TAB | Refills: 4 | Status: SHIPPED | OUTPATIENT
Start: 2020-11-30 | End: 2021-05-07

## 2020-11-30 NOTE — PROGRESS NOTES
Ash bustillo,  I have reviewed your results.  hgba1c has improved from 10.1 to 8.5, goal is to bring < 7.0. as we discussed I will change Glipizide to Glimepiride,so you can take one time in morning and take Metformin two times. Cholesterol shows significant improvement since you are on atorvastatin though not taking regularly. Please start taking daily in morning with other medicines   All other results including blood count,kidney,liver, thyroid, urine protein are normal  Let me know if you have any questions.   thanks

## 2020-11-30 NOTE — PATIENT INSTRUCTIONS

## 2020-11-30 NOTE — PROGRESS NOTES
Chief Complaint   Patient presents with    Complete Physical     fasting     Immunization/Injection     PNEUMOVAX 23      1. Have you been to the ER, urgent care clinic since your last visit? Hospitalized since your last visit? No    2. Have you seen or consulted any other health care providers outside of the [address] [place] [address] since your last visit? Include any pap smears or colon screening. No    Barrington Blackwood  is a 39 y.o.  male  who present for PNEUMOVAX 23  immunizations/injections. He/she denies any symptoms , reactions or allergies that would exclude them from being immunized today. Risks and adverse reactions were discussed and the VIS was given if applicable to them. All questions were addressed. He/She was observed for 10 min post injection. There were no reactions observed.     Carolynn Bryant LPN

## 2020-11-30 NOTE — PROGRESS NOTES
HISTORY OF PRESENT ILLNESS  Soco Lazcano is a 39 y.o. male. seen for complete physical and follow up on uncontrolled DM,dyslipidemia and family h/o premature CAD  HPI  Health Maintenance  Immunizations:    Influenza: will get at pharmacy. Tetanus: unknown, record requested. Gardasil: n/a. Cancer screening:    Reviewed testicular, prostate, and colon cancer screening guidelines. Endocrine Review  He is seen for diabetes.  Since last visit he reports: no polyuria or polydipsia, no chest pain, dyspnea or TIA's, no numbness, tingling or pain in extremities, no unusual visual symptoms, no hypoglycemia, no medication side effects noted, weight has decreased, no significant changes.  Testing: is not performed.  He reports medication compliance: taking Metformin one time only.  Medication side effects: none.  Diabetic diet compliance: compliant all of the time.  Lab review:     His Metformin dose was changed to 1 gm bid and Glipizide 5 mg bid was added. He is taking all medicines only one time  Lab Results   Component Value Date/Time    Hemoglobin A1c 8.5 (H) 11/30/2020 10:37 AM               Cardiovascular Review  The patient has hypertension, hyperlipidemia and Family h/o CAD.   He reports taking medications as instructed, no medication side effects noted, patient does not perform home BP monitoring, no chest pain on exertion, no dyspnea on exertion, no orthostatic dizziness or lightheadedness, no orthopnea or paroxysmal nocturnal dyspnea, no palpitations, no intermittent claudication symptoms.  Diet and Lifestyle: generally follows a low fat low cholesterol diet, generally follows a low sodium diet, exercises regularly, nonsmoker.  Lab review: no lab studies available for review at time of visit.    Medicines: Lisinopril 10 mg and Atorvastatin 10 mg taht he is taking inconsistently  Lab Results   Component Value Date/Time    Cholesterol, total 172 11/30/2020 10:37 AM    HDL Cholesterol 43 11/30/2020 10:37 AM    LDL, calculated 106 (H) 11/30/2020 10:37 AM    VLDL, calculated 23 11/30/2020 10:37 AM    Triglyceride 115 11/30/2020 10:37 AM    CHOL/HDL Ratio 4.0 11/30/2020 10:37 AM         Patient Care Team:  Safia Norton MD as PCP - General (Family Medicine)  Safai Norton MD as PCP - [facility] Provider       The following sections were reviewed & updated as appropriate: PMH, PSH, FH, and SH. Review of Systems   Constitutional: Negative for chills, fever and malaise/fatigue. HENT: Negative for congestion, ear pain, sore throat and tinnitus. Eyes: Negative for blurred vision, double vision, pain and discharge. Respiratory: Negative for cough, shortness of breath and wheezing. Cardiovascular: Negative for chest pain, palpitations and leg swelling. Gastrointestinal: Negative for abdominal pain, blood in stool, constipation, diarrhea, nausea and vomiting. Genitourinary: Negative for dysuria, frequency, hematuria and urgency. Musculoskeletal: Negative for back pain, joint pain and myalgias. Skin: Negative for rash. Neurological: Negative for dizziness, tremors, seizures and headaches. Endo/Heme/Allergies: Negative for polydipsia. Does not bruise/bleed easily. Psychiatric/Behavioral: Negative for depression and substance abuse. The patient is not nervous/anxious. Physical Exam  Vitals signs and nursing note reviewed. Constitutional:       Appearance: He is well-developed. HENT:      Head: Normocephalic and atraumatic. Right Ear: External ear normal.      Left Ear: External ear normal.      Nose: Nose normal.      Mouth/Throat:      Pharynx: No oropharyngeal exudate. Eyes:      Conjunctiva/sclera: Conjunctivae normal.      Pupils: Pupils are equal, round, and reactive to light. Neck:      Musculoskeletal: Normal range of motion and neck supple. Thyroid: No thyromegaly. Cardiovascular:      Rate and Rhythm: Normal rate and regular rhythm.       Heart sounds: Normal heart sounds. No murmur. Pulmonary:      Effort: Pulmonary effort is normal. No respiratory distress. Breath sounds: Normal breath sounds. No wheezing. Abdominal:      General: Bowel sounds are normal. There is no distension. Palpations: Abdomen is soft. There is no mass. Genitourinary:     Penis: Normal.       Scrotum/Testes: Normal.         Right: Mass not present. Left: Mass not present. Musculoskeletal: Normal range of motion. General: No tenderness. Skin:     General: Skin is warm and dry. Findings: No rash. Neurological:      Mental Status: He is alert and oriented to person, place, and time. Sensory: No sensory deficit. Deep Tendon Reflexes: Reflexes are normal and symmetric. Comments: Cranial nerves 2-12 normal   Psychiatric:         Behavior: Behavior normal.         Thought Content: Thought content normal.         ASSESSMENT and PLAN  Diagnoses and all orders for this visit:    1. Routine general medical examination at a health care facility  -     METABOLIC PANEL, COMPREHENSIVE; Future  -     CBC WITH AUTOMATED DIFF; Future  -     TSH 3RD GENERATION; Future  -     URINALYSIS W/ RFLX MICROSCOPIC; Future  -     LIPID PANEL; Future  -     HEMOGLOBIN A1C WITH EAG; Future    2. Encounter for immunization  -     PNEUMOCOCCAL POLYSACCHARIDE VACCINE, 23-VALENT, ADULT OR IMMUNOSUPPRESSED PT DOSE,  -     ADMIN PNEUMOCOCCAL VACCINE    3. Controlled type 2 diabetes mellitus without complication, without long-term current use of insulin ([facility])  -     MICROALBUMIN, UR, RAND W/ MICROALB/CREAT RATIO; Future  -     REFERRAL TO OPHTHALMOLOGY    4.  Family history of early CAD    again had long discussion on compliance of medicines,diet and exercise  Discussed lifestyle issues and health guidance given  Patient was given an after visit summary which includes diagnoses, vital signs, current medications, instructions and references & authorized prescriptions . Results of labs will be conveyed to patient, once available. Pt verbalized instructions I provided and expressed understanding of discussion that was held today. Follow-up and Dispositions    · Return in about 4 months (around 3/30/2021) for follow up, fasting.

## 2020-12-02 NOTE — PROGRESS NOTES
Results discussed with patient by Dr. Jenny Schmitt via [place]. Letter sent with results and instructions as follow up.   Alejandro Hess LPN

## 2021-02-01 DIAGNOSIS — I10 ESSENTIAL HYPERTENSION: ICD-10-CM

## 2021-02-01 RX ORDER — LISINOPRIL 10 MG/1
TABLET ORAL
Qty: 90 TAB | Refills: 0 | Status: SHIPPED | OUTPATIENT
Start: 2021-02-01 | End: 2021-03-10

## 2021-03-10 DIAGNOSIS — I10 ESSENTIAL HYPERTENSION: ICD-10-CM

## 2021-03-10 RX ORDER — LISINOPRIL 10 MG/1
TABLET ORAL
Qty: 90 TAB | Refills: 0 | Status: SHIPPED | OUTPATIENT
Start: 2021-03-10 | End: 2021-05-05

## 2021-05-05 DIAGNOSIS — I10 ESSENTIAL HYPERTENSION: ICD-10-CM

## 2021-05-05 RX ORDER — LISINOPRIL 10 MG/1
TABLET ORAL
Qty: 30 TAB | Refills: 0 | Status: SHIPPED | OUTPATIENT
Start: 2021-05-05 | End: 2021-07-23

## 2021-05-05 NOTE — TELEPHONE ENCOUNTER
Due to his history of uncontrolled diabetes, patient needs strict 3 to 4 months follow-up. His last visit was November 2020. Has been almost 6 months. He needs an appointment for any further refills.  We will send 30-day supply for now

## 2021-05-05 NOTE — TELEPHONE ENCOUNTER
Attempted to call patient. Left message on voicemail to return call back and schedule an appointment.

## 2021-05-07 DIAGNOSIS — E11.9 CONTROLLED TYPE 2 DIABETES MELLITUS WITHOUT COMPLICATION, WITHOUT LONG-TERM CURRENT USE OF INSULIN (HCC): ICD-10-CM

## 2021-05-07 RX ORDER — GLIMEPIRIDE 2 MG/1
TABLET ORAL
Qty: 15 TAB | Refills: 0 | Status: SHIPPED | OUTPATIENT
Start: 2021-05-07 | End: 2021-05-21

## 2021-05-07 RX ORDER — METFORMIN HYDROCHLORIDE 1000 MG/1
TABLET ORAL
Qty: 30 TAB | Refills: 0 | Status: SHIPPED | OUTPATIENT
Start: 2021-05-07 | End: 2021-05-21

## 2021-05-20 ENCOUNTER — OFFICE VISIT (OUTPATIENT)
Dept: FAMILY MEDICINE CLINIC | Age: 46
End: 2021-05-20
Payer: COMMERCIAL

## 2021-05-20 VITALS
SYSTOLIC BLOOD PRESSURE: 130 MMHG | DIASTOLIC BLOOD PRESSURE: 80 MMHG | OXYGEN SATURATION: 98 % | TEMPERATURE: 97.5 F | HEART RATE: 81 BPM | RESPIRATION RATE: 16 BRPM | BODY MASS INDEX: 27.69 KG/M2 | WEIGHT: 197.8 LBS | HEIGHT: 71 IN

## 2021-05-20 DIAGNOSIS — Z82.49 FAMILY HISTORY OF EARLY CAD: ICD-10-CM

## 2021-05-20 DIAGNOSIS — I10 ESSENTIAL HYPERTENSION: ICD-10-CM

## 2021-05-20 DIAGNOSIS — E11.9 CONTROLLED TYPE 2 DIABETES MELLITUS WITHOUT COMPLICATION, WITHOUT LONG-TERM CURRENT USE OF INSULIN (HCC): Primary | ICD-10-CM

## 2021-05-20 DIAGNOSIS — E78.5 DYSLIPIDEMIA, GOAL LDL BELOW 100: ICD-10-CM

## 2021-05-20 PROCEDURE — 99213 OFFICE O/P EST LOW 20 MIN: CPT | Performed by: FAMILY MEDICINE

## 2021-05-20 NOTE — PATIENT INSTRUCTIONS
Diabetes Foot Health: Care Instructions Your Care Instructions When you have diabetes, your feet need extra care and attention. Diabetes can damage the nerve endings and blood vessels in your feet, making you less likely to notice when your feet are injured. Diabetes also limits your body's ability to fight infection and get blood to areas that need it. If you get a minor foot injury, it could become an ulcer or a serious infection. With good foot care, you can prevent most of these problems. Caring for your feet can be quick and easy. Most of the care can be done when you are bathing or getting ready for bed. Follow-up care is a key part of your treatment and safety. Be sure to make and go to all appointments, and call your doctor if you are having problems. It's also a good idea to know your test results and keep a list of the medicines you take. How can you care for yourself at home? · Keep your blood sugar close to normal by watching what and how much you eat, monitoring blood sugar, taking medicines if prescribed, and getting regular exercise. · Do not smoke. Smoking affects blood flow and can make foot problems worse. If you need help quitting, talk to your doctor about stop-smoking programs and medicines. These can increase your chances of quitting for good. · Eat a diet that is low in fats. High fat intake can cause fat to build up in your blood vessels and decrease blood flow. · Inspect your feet daily for blisters, cuts, cracks, or sores. If you cannot see well, use a mirror or have someone help you. · Take care of your feet: 
? Wash your feet every day. Use warm (not hot) water. Check the water temperature with your wrists or other part of your body, not your feet. ? Dry your feet well. Pat them dry. Do not rub the skin on your feet too hard. Dry well between your toes. If the skin on your feet stays moist, bacteria or a fungus can grow, which can lead to infection. ?  Keep your skin soft. Use moisturizing skin cream to keep the skin on your feet soft and prevent calluses and cracks. But do not put the cream between your toes, and stop using any cream that causes a rash. ? Clean underneath your toenails carefully. Do not use a sharp object to clean underneath your toenails. Use the blunt end of a nail file or other rounded tool. ? Trim and file your toenails straight across to prevent ingrown toenails. Use a nail clipper, not scissors. Use an emery board to smooth the edges. · Change socks daily. Socks without seams are best, because seams often rub the feet. You can find socks for people with diabetes from specialty catalogs. · Look inside your shoes every day for things like gravel or torn linings, which could cause blisters or sores. · Buy shoes that fit well: 
? Look for shoes that have plenty of space around the toes. This helps prevent bunions and blisters. ? Try on shoes while wearing the kind of socks you will usually wear with the shoes. ? Avoid plastic shoes. They may rub your feet and cause blisters. Good shoes should be made of materials that are flexible and breathable, such as leather or cloth. ? Break in new shoes slowly by wearing them for no more than an hour a day for several days. Take extra time to check your feet for red areas, blisters, or other problems after you wear new shoes. · Do not go barefoot. Do not wear sandals, and do not wear shoes with very thin soles. Thin soles are easy to puncture. They also do not protect your feet from hot pavement or cold weather. · Have your doctor check your feet during each visit. If you have a foot problem, see your doctor. Do not try to treat an early foot problem at home. Home remedies or treatments that you can buy without a prescription (such as corn removers) can be harmful. · Always get early treatment for foot problems. A minor irritation can lead to a major problem if not properly cared for early.  
When should you call for help? Call your doctor now or seek immediate medical care if: 
  · You have a foot sore, an ulcer or break in the skin that is not healing after 4 days, bleeding corns or calluses, or an ingrown toenail.  
  · You have blue or black areas, which can mean bruising or blood flow problems.  
  · You have peeling skin or tiny blisters between your toes or cracking or oozing of the skin.  
  · You have a fever for more than 24 hours and a foot sore.  
  · You have new numbness or tingling in your feet that does not go away after you move your feet or change positions.  
  · You have unexplained or unusual swelling of the foot or ankle. Watch closely for changes in your health, and be sure to contact your doctor if: 
  · You cannot do proper foot care. Where can you learn more? Go to http://www.gray.com/ Enter A739 in the search box to learn more about \"Diabetes Foot Health: Care Instructions. \" Current as of: August 31, 2020               Content Version: 12.8 © 2006-2021 [employer]. Care instructions adapted under license by [employer] (which disclaims liability or warranty for this information). If you have questions about a medical condition or this instruction, always ask your healthcare professional. [address] any warranty or liability for your use of this information.

## 2021-05-20 NOTE — PROGRESS NOTES
HISTORY OF PRESENT ILLNESS  John Bustamante is a 39 y.o. male. Patient was seen today for 6 months follow-up on diabetes, dyslipidemia and family history of early CAD. HPI  Endocrine Review  He is seen for diabetes.  Since last visit he reports: no polyuria or polydipsia, no chest pain, dyspnea or TIA's, no numbness, tingling or pain in extremities, no unusual visual symptoms, no hypoglycemia, no medication side effects noted, weight has decreased, no significant changes.  Testing: is not performed.  He reports medication compliance: taking Metformin one time only.  Medication side effects: none.  Diabetic diet compliance: compliant all of the time.  Lab review:   After last visit, his Metformin dose was increased to 1 g twice daily and glipizide was changed to glimepiride to improve compliance.      Lab Results   Component Value Date/Time    Hemoglobin A1c 8.5 (H) 11/30/2020 10:37 AM           Cardiovascular Review  The patient has hypertension, hyperlipidemia and Family h/o CAD.   He reports taking medications as instructed, no medication side effects noted, patient does not perform home BP monitoring, no chest pain on exertion, no dyspnea on exertion, no orthostatic dizziness or lightheadedness, no orthopnea or paroxysmal nocturnal dyspnea, no palpitations, no intermittent claudication symptoms.  Diet and Lifestyle: generally follows a low fat low cholesterol diet, generally follows a low sodium diet, exercises regularly, nonsmoker.  Lab review: no lab studies available for review at time of visit.    Medicines: Lisinopril 10 mg and stopped atorvastatin 10 mg     Review of Systems   Constitutional: Negative for chills, fever and malaise/fatigue. HENT: Negative for congestion, ear pain, sore throat and tinnitus. Eyes: Negative for blurred vision, double vision, pain and discharge. Respiratory: Negative for cough, shortness of breath and wheezing.     Cardiovascular: Negative for chest pain, palpitations and leg swelling. Gastrointestinal: Negative for abdominal pain, blood in stool, constipation, diarrhea, nausea and vomiting. Genitourinary: Negative for dysuria, frequency, hematuria and urgency. Musculoskeletal: Negative for back pain, joint pain and myalgias. Skin: Negative for rash. Neurological: Negative for dizziness, tremors, seizures and headaches. Endo/Heme/Allergies: Negative for polydipsia. Does not bruise/bleed easily. Psychiatric/Behavioral: Negative for depression and substance abuse. The patient is not nervous/anxious. Physical Exam  Vitals and nursing note reviewed. Constitutional:       Appearance: He is well-developed. He is not diaphoretic. HENT:      Head: Normocephalic and atraumatic. Right Ear: External ear normal.      Mouth/Throat:      Pharynx: No oropharyngeal exudate. Eyes:      General: No scleral icterus. Conjunctiva/sclera: Conjunctivae normal.      Pupils: Pupils are equal, round, and reactive to light. Neck:      Thyroid: No thyromegaly. Vascular: No JVD. Cardiovascular:      Rate and Rhythm: Normal rate and regular rhythm. Pulses:           Dorsalis pedis pulses are 2+ on the right side and 2+ on the left side. Posterior tibial pulses are 2+ on the right side and 2+ on the left side. Heart sounds: Normal heart sounds. No murmur heard. Pulmonary:      Effort: Pulmonary effort is normal.      Breath sounds: Normal breath sounds. No wheezing. Abdominal:      General: Bowel sounds are normal. There is no distension. Palpations: Abdomen is soft. There is no mass. Musculoskeletal:         General: No tenderness. Normal range of motion. Cervical back: Normal range of motion and neck supple. Feet:      Right foot:      Protective Sensation: 10 sites tested. 9 sites sensed. Skin integrity: Callus and dry skin present.       Toenail Condition: Right toenails are normal.      Left foot:      Protective Sensation: 10 sites tested. 9 sites sensed. Skin integrity: Callus and dry skin present. Toenail Condition: Left toenails are normal.   Lymphadenopathy:      Cervical: No cervical adenopathy. Skin:     General: Skin is warm and dry. Findings: No rash. Neurological:      Mental Status: He is alert and oriented to person, place, and time. Cranial Nerves: No cranial nerve deficit. Deep Tendon Reflexes: Reflexes are normal and symmetric. Reflexes normal.         ASSESSMENT and PLAN  Diagnoses and all orders for this visit:    1. Controlled type 2 diabetes mellitus without complication, without long-term current use of insulin ([facility])  -      DIABETES FOOT EXAM  -     HEMOGLOBIN A1C WITH EAG; Future  -     LIPID PANEL; Future  -     METABOLIC PANEL, COMPREHENSIVE; Future    2. Family history of early CAD  -     LIPID PANEL; Future    3. Dyslipidemia, goal LDL below 100    4. Essential hypertension      Again had a long discussion on compliance with medications and follow-ups. Discussed foot care and printout was given. Ophthalmic referral was reprinted and was strongly recommended to schedule appointment. Discussed lifestyle issues and health guidance given  Patient was given an after visit summary which includes diagnoses, vital signs, current medications, instructions and references & authorized prescriptions . Results of labs will be conveyed to patient, once available. Pt verbalized instructions I provided and expressed understanding of discussion that was held today.   Follow-up and Dispositions    · Return in about 6 months (around 11/20/2021) for fasting, physical.

## 2021-05-20 NOTE — PROGRESS NOTES
Chief Complaint   Patient presents with    Diabetes     4 months follow up       1. Have you been to the ER, urgent care clinic since your last visit? Hospitalized since your last visit? No    2. Have you seen or consulted any other health care providers outside of the [address] [place] [address] since your last visit? Include any pap smears or colon screening.  No        3 most recent PHQ Screens 5/20/2021   Little interest or pleasure in doing things Not at all   Feeling down, depressed, irritable, or hopeless Not at all   Total Score PHQ 2 0       Health Maintenance Due   Topic Date Due    Hepatitis C Screening  Never done    Eye Exam Retinal or Dilated  Never done    DTaP/Tdap/Td series (1 - Tdap) Never done    Foot Exam Q1  02/26/2021     Pt didn't have an eye exam recently

## 2021-05-21 DIAGNOSIS — E11.9 CONTROLLED TYPE 2 DIABETES MELLITUS WITHOUT COMPLICATION, WITHOUT LONG-TERM CURRENT USE OF INSULIN (HCC): Primary | ICD-10-CM

## 2021-05-21 LAB
ALBUMIN SERPL-MCNC: 4.2 G/DL (ref 3.5–5)
ALBUMIN/GLOB SERPL: 1.1 {RATIO} (ref 1.1–2.2)
ALP SERPL-CCNC: 78 U/L (ref 45–117)
ALT SERPL-CCNC: 41 U/L (ref 12–78)
ANION GAP SERPL CALC-SCNC: 4 MMOL/L (ref 5–15)
AST SERPL-CCNC: 18 U/L (ref 15–37)
BILIRUB SERPL-MCNC: 0.8 MG/DL (ref 0.2–1)
BUN SERPL-MCNC: 12 MG/DL (ref 6–20)
BUN/CREAT SERPL: 16 (ref 12–20)
CALCIUM SERPL-MCNC: 8.5 MG/DL (ref 8.5–10.1)
CHLORIDE SERPL-SCNC: 103 MMOL/L (ref 97–108)
CHOLEST SERPL-MCNC: 190 MG/DL
CO2 SERPL-SCNC: 29 MMOL/L (ref 21–32)
CREAT SERPL-MCNC: 0.75 MG/DL (ref 0.7–1.3)
EST. AVERAGE GLUCOSE BLD GHB EST-MCNC: 209 MG/DL
GLOBULIN SER CALC-MCNC: 3.7 G/DL (ref 2–4)
GLUCOSE SERPL-MCNC: 205 MG/DL (ref 65–100)
HBA1C MFR BLD: 8.9 % (ref 4–5.6)
HDLC SERPL-MCNC: 42 MG/DL
HDLC SERPL: 4.5 {RATIO} (ref 0–5)
LDLC SERPL CALC-MCNC: 114.8 MG/DL (ref 0–100)
POTASSIUM SERPL-SCNC: 4.2 MMOL/L (ref 3.5–5.1)
PROT SERPL-MCNC: 7.9 G/DL (ref 6.4–8.2)
SODIUM SERPL-SCNC: 136 MMOL/L (ref 136–145)
TRIGL SERPL-MCNC: 166 MG/DL (ref ?–150)
VLDLC SERPL CALC-MCNC: 33.2 MG/DL

## 2021-05-21 NOTE — PROGRESS NOTES
Ash Stoddard,  Reviewed your results. Both fasting sugar and average blood sugar are extremely high and in range of uncontrolled diabetes. I am not sure you are following your medications and taking glimepiride and Metformin as recommended. Metformin is 2 times and glimepiride is 1 time before your breakfast.  Adding Januvia to your glimepiride and Metformin. Cholesterol results are okay, you are not taking cholesterol medicine but we can keep holding on that. Strongly recommend to follow diet and medications to avoid long-term diabetic complications to your kidney, heart and your eyes. If numbers stays high, you may end up with insulin injections. Recommend to follow-up in 3 months strictly. Let me know if you have any question, thanks.

## 2021-05-21 NOTE — PROGRESS NOTES
Attempted to call patient. Unable to leave message. Number not in service. Letter placed in the mail.

## 2021-05-24 ENCOUNTER — TELEPHONE (OUTPATIENT)
Dept: FAMILY MEDICINE CLINIC | Age: 46
End: 2021-05-24

## 2021-05-24 DIAGNOSIS — E11.9 CONTROLLED TYPE 2 DIABETES MELLITUS WITHOUT COMPLICATION, WITHOUT LONG-TERM CURRENT USE OF INSULIN (HCC): Primary | ICD-10-CM

## 2021-05-25 ENCOUNTER — TELEPHONE (OUTPATIENT)
Dept: FAMILY MEDICINE CLINIC | Age: 46
End: 2021-05-25

## 2021-05-25 NOTE — TELEPHONE ENCOUNTER
MD Tomer Belcher sending fax stating januvia 25mg tabs are not covered. Try for PA?     Rx sent 5/21/21 30 + [employer]  ID: [id number]  Phone [phone]    Plan exclusion  Alt options: Nolvia Quiñonez    Thanks, Gómez Villatoro

## 2021-05-25 NOTE — TELEPHONE ENCOUNTER
Chief Complaint   Patient presents with    Prior Auth     Januvia 25 mg tablet :  Denied     Duplicate encounter.   Guido Valle LPN

## 2021-05-27 NOTE — TELEPHONE ENCOUNTER
Do we get idea,what is covered for him ? ?  Previous Rx of Jardiance,Farxiga,Januvia none are in preferred list .  thanks

## 2021-05-28 ENCOUNTER — DOCUMENTATION ONLY (OUTPATIENT)
Dept: FAMILY MEDICINE CLINIC | Age: 46
End: 2021-05-28

## 2021-05-28 ENCOUNTER — TELEPHONE (OUTPATIENT)
Dept: FAMILY MEDICINE CLINIC | Age: 46
End: 2021-05-28

## 2021-05-28 NOTE — TELEPHONE ENCOUNTER
Pharmacy Progress Note - Telephone Encounter    S/O: Mr. Wally Resendiz 39 y.o. male, referred by Dr. Cherelle Perez MD, was contacted via an outbound telephone call to discuss scheduling DM management visit today. Verified patients identifiers (name & ) per HIPAA policy. - Pt's insurance was called. Pt has a $1800 deductible until drug coverage occurs. Jardiance (requires PA) costs $530/mo until deductible met - then approx $100/mo - could be decreased by  coupon  Trulicity (requires PA) costs $1600/mo until deductible met - then approx $200/mo - could be decreased by  coupon  Pioglitazone costs $13/mo     - Pt receives insurance thru employer. Not eligible for prescription assistance programs.    - Insurance company stated that an appeal may be granted for Januvia if applied for. Or Tradjenta or Onglyza are DPP-IV agents that are on his formulary.    - Pt states he has been watching what he eats. He is not checking BG at home. Has a meter. Amenable to meeting with PharmD. A/P:  - Visit scheduled for 21 at 10AM  - Pt was asked to check fasting BG and bring log to visit  - Patient endorses understanding to the provided information. All questions answered at this time. There are no discontinued medications. No orders of the defined types were placed in this encounter. Janey Kiran, EdwardD, [facility]  Clinical Pharmacist Specialist      For Pharmacy Admin Tracking Only     CPA in place:  Yes   Recommendation Provided To: Patient/Caregiver: 1 via Telephone   Intervention Detail: Scheduled Appointment   Gap Closed?: No   Total # of Interventions Recommended: 1   Total # of Interventions Accepted: 1   Intervention Accepted By: Patient/Caregiver: 1   Time Spent (min): 15

## 2021-06-16 ENCOUNTER — TELEPHONE (OUTPATIENT)
Dept: FAMILY MEDICINE CLINIC | Age: 46
End: 2021-06-16

## 2021-06-16 DIAGNOSIS — E11.65 TYPE 2 DIABETES MELLITUS WITH HYPERGLYCEMIA, WITHOUT LONG-TERM CURRENT USE OF INSULIN (HCC): Primary | ICD-10-CM

## 2021-06-16 DIAGNOSIS — E11.9 TYPE 2 DIABETES MELLITUS WITHOUT COMPLICATION, WITHOUT LONG-TERM CURRENT USE OF INSULIN (HCC): ICD-10-CM

## 2021-06-16 RX ORDER — PIOGLITAZONEHYDROCHLORIDE 15 MG/1
15 TABLET ORAL DAILY
Qty: 90 TABLET | Refills: 0 | Status: SHIPPED
Start: 2021-06-16 | End: 2021-08-27 | Stop reason: SINTOL

## 2021-06-16 NOTE — TELEPHONE ENCOUNTER
Pharmacy Progress Note - Telephone Encounter    S/O: Mr. Weinstein August 39 y.o. male, referred by Dr. Kayla Duran MD, was contacted via an outbound telephone call to discuss cost of [address]. Verified patients identifiers (name & ) per HIPAA policy. - Pt states Onglyza is $443/mo. He is unable to afford this medication. Pioglitazone is $13/mo    A/P:  - STOP Onglyza  - START Pioglitazone 15 mg every day   - Patient endorses understanding to the provided information. All questions answered at this time. Medications Discontinued During This Encounter   Medication Reason    sAXagliptin (Onglyza) 2.5 mg tablet Cost of Medication     Orders Placed This Encounter    pioglitazone (ACTOS) 15 mg tablet     Sig: Take 1 Tablet by mouth daily. Dispense:  90 Tablet     Refill:  0       Edward ThomasD, [facility]  Clinical Pharmacist Specialist      For Pharmacy Admin Tracking Only     CPA in place:  Yes   Recommendation Provided To: Patient/Caregiver: 2 via Telephone   Intervention Detail: Discontinued Rx: 1, reason: Cost/Formulary Change and New Rx: 1, reason: Cost/Formulary Change   Gap Closed?: No   Total # of Interventions Recommended: 2   Total # of Interventions Accepted: 2   Intervention Accepted By: Patient/Caregiver: 2   Time Spent (min): 10

## 2021-06-18 ENCOUNTER — TELEPHONE (OUTPATIENT)
Dept: FAMILY MEDICINE CLINIC | Age: 46
End: 2021-06-18

## 2021-06-18 NOTE — TELEPHONE ENCOUNTER
Pharmacy Progress Note - Telephone Encounter    S/O: Mr. Jodie Swift 39 y.o. male, referred by Dr. Henry Ward MD, was contacted via an outbound telephone call to discuss medication cost today. Verified patients identifiers (name & ) per HIPAA policy.     - This writer was contacted about new DM med, Pioglitazone, cost.  Pt was concerned that it was costing $36.  Rx was sent for 90 day supply. Pt was unaware of that. Pt will  med and start. - Discussed dose, frequency, and side effects. A/P:  - Patient endorses understanding to the provided information. All questions answered at this time. There are no discontinued medications. No orders of the defined types were placed in this encounter. Juan Luis Burton, PharmD, [facility]  Clinical Pharmacist Specialist      For Pharmacy Admin Tracking Only     CPA in place:  Yes   Time Spent (min): 5

## 2021-07-02 ENCOUNTER — OFFICE VISIT (OUTPATIENT)
Dept: FAMILY MEDICINE CLINIC | Age: 46
End: 2021-07-02
Payer: COMMERCIAL

## 2021-07-02 VITALS
HEART RATE: 73 BPM | BODY MASS INDEX: 28.34 KG/M2 | WEIGHT: 203.2 LBS | DIASTOLIC BLOOD PRESSURE: 84 MMHG | SYSTOLIC BLOOD PRESSURE: 122 MMHG

## 2021-07-02 DIAGNOSIS — E78.5 DYSLIPIDEMIA, GOAL LDL BELOW 100: ICD-10-CM

## 2021-07-02 DIAGNOSIS — Z82.49 FAMILY HISTORY OF EARLY CAD: ICD-10-CM

## 2021-07-02 DIAGNOSIS — E11.65 TYPE 2 DIABETES MELLITUS WITH HYPERGLYCEMIA, WITHOUT LONG-TERM CURRENT USE OF INSULIN (HCC): Primary | ICD-10-CM

## 2021-07-02 LAB — GLUCOSE POC: 174 MG/DL

## 2021-07-02 PROCEDURE — 82962 GLUCOSE BLOOD TEST: CPT | Performed by: PHARMACIST

## 2021-07-02 RX ORDER — ATORVASTATIN CALCIUM 10 MG/1
TABLET, FILM COATED ORAL
Qty: 30 TABLET | Refills: 0 | Status: SHIPPED | OUTPATIENT
Start: 2021-07-02 | End: 2021-08-06

## 2021-07-02 NOTE — PROGRESS NOTES
Please call patient and let him know that he needs to schedule strict 3 months follow-up appointment with me. He has follow-up appointment with Sugey our pharmacist, but will need to see me before her for some blood work and medication management. Please schedule if he is able to.   Thanks

## 2021-07-02 NOTE — PROGRESS NOTES
Pharmacy Progress Note - Diabetes Management    S/O: Mr. Tommy Cavanaugh is a 55 y.o. male, referred by Dr. Mercedes Murray MD, with a PMH of T2DM, was seen today for diabetes management. Patient's last A1c was 8.9% (May 2021) which was elevated from A1c 8.5% (2020). Interim update: Pt was last seen by PharmD on 21. In order to gain more BG control, Onglyza was prescribed; however, agent was switched to Pioglitazone due to cost ($400 vs $13 per month). At previous visit, there was a significant time dedicated to discussing diet and physical activity. Pt made goals to decrease carb intake and increase activity level. Started Pioglitazone around 21 and took for 2-3 days. Experienced itching and small bumps after going on nighttime walks in wooded areas. Pt associated itching and bumps with medication. They did not resolve after stopping med. Pt also concerned about small nodules on both arms that are identical in size, shape, and placement. Pt denies pain upon palpation. States they have grown over the past few years. Wants to know what they are and what can be done to remove them.     SHx:  -  for [employer]    Medication Adherence/Access:  - Pt has $1800 deductible for insurance    Current anti-hyperglycemic regimen includes:    - Metformin 1000 mg BID  - Glimepiride 2 mg QAM  - Pioglitazone 15 mg every day     ROS:  Today, Pt endorses:  - Symptoms of Hyperglycemia: none  - Symptoms of Hypoglycemia: none    Self Monitoring Blood Glucose (SMBG) or CGM:  - Brought in home glucometer/blood glucose log/CGM reader today:  no  - Checks BG: checked once a week - fasting  - Fasting SMBG today: 174 mg/dL - dinner last night was tomato and onion raines and lentil idli ( dish)  Fastins-200s - pt states similar to prior to visit, numbers have not decreased    Nutrition:  - Eats 2 meals/day   - Pt has gained 5 lbs after switching up diet  - Decreased rice intake but increased bread and lentils  - Lunch: wheat bread sandwiches or 4 small tortilla roll ups  - Dinner: yogurt, fruit (watermelon, honey dew)  - Bought broccoli but has not eaten or increased vegetables  - Beverage(s): diet coke, water - stopped mountain dew  - Alcohol consumption? Yes - Friday and Saturday 2-3/night  - weekends - eat out - subway, [employer] (chicken wings)    Physical Activity:   yes  - Consists of 40-60 min stroll QPM     Diabetes Health Maintenance:  · ASCVD Risk Factors: High LDL, Blood Pressure and Diabetes  · ASA therapy:  none   · ACE/ARB therapy: Lisinopril 10 mg  · Optimized statin therapy: Atorvastatin 10 mg  · The 10-year ASCVD risk score (Sherin Farrell, et al., 2013) is: 5.5%    · LDL: 114.8 mg/dL (5/20/21)  · Nicotine dependence: No  · Last eye exam: 11/30/20  · Last foot exam: 5/20/21  · Last influenza vaccine: discussed recommendation - every year  · Last Pneumovax 23 vaccine: 11/30/20  · Last Prevnar-13 vaccine: will discuss at future visit  · Hepatitis B Series: will discuss at future visit   · COVID-19 vaccine: 4/22/21, 5/13/21      Vitals: Wt Readings from Last 3 Encounters:   06/11/21 198 lb 3.2 oz (89.9 kg)   05/20/21 197 lb 12.8 oz (89.7 kg)   11/30/20 203 lb (92.1 kg)     BP Readings from Last 3 Encounters:   06/11/21 (!) 125/92   05/20/21 130/80   11/30/20 128/84     Pulse Readings from Last 3 Encounters:   05/20/21 81   11/30/20 83   02/26/20 73       Past Medical History:   Diagnosis Date    Diabetes ([address]     Hypertension      No Known Allergies    Current Outpatient Medications   Medication Sig    pioglitazone (ACTOS) 15 mg tablet Take 1 Tablet by mouth daily.  metFORMIN (GLUCOPHAGE) 1,000 mg tablet TAKE ONE TABLET BY MOUTH TWICE A DAY WITH MEALS **FAR DUE FOR FOLLOW UP    glimepiride (AMARYL) 2 mg tablet Take 1 Tablet by mouth every morning.     lisinopriL (PRINIVIL, ZESTRIL) 10 mg tablet TAKE ONE TABLET BY MOUTH DAILY    atorvastatin (LIPITOR) 10 mg tablet TAKE ONE TABLET BY MOUTH EVERY NIGHT AT BEDTIME     No current facility-administered medications for this visit. Lab Results   Component Value Date/Time    Sodium 136 05/20/2021 11:19 AM    Potassium 4.2 05/20/2021 11:19 AM    Chloride 103 05/20/2021 11:19 AM    CO2 29 05/20/2021 11:19 AM    Anion gap 4 (L) 05/20/2021 11:19 AM    Glucose 205 (H) 05/20/2021 11:19 AM    BUN 12 05/20/2021 11:19 AM    Creatinine 0.75 05/20/2021 11:19 AM    BUN/Creatinine ratio 16 05/20/2021 11:19 AM    GFR est AA >60 05/20/2021 11:19 AM    GFR est non-AA >60 05/20/2021 11:19 AM    Calcium 8.5 05/20/2021 11:19 AM    Bilirubin, total 0.8 05/20/2021 11:19 AM    Alk. phosphatase 78 05/20/2021 11:19 AM    Protein, total 7.9 05/20/2021 11:19 AM    Albumin 4.2 05/20/2021 11:19 AM    Globulin 3.7 05/20/2021 11:19 AM    A-G Ratio 1.1 05/20/2021 11:19 AM    ALT (SGPT) 41 05/20/2021 11:19 AM     Lab Results   Component Value Date/Time    Cholesterol, total 190 05/20/2021 11:19 AM    HDL Cholesterol 42 05/20/2021 11:19 AM    LDL, calculated 114.8 (H) 05/20/2021 11:19 AM    VLDL, calculated 33.2 05/20/2021 11:19 AM    Triglyceride 166 (H) 05/20/2021 11:19 AM    CHOL/HDL Ratio 4.5 05/20/2021 11:19 AM     Lab Results   Component Value Date/Time    WBC 5.0 11/30/2020 10:37 AM    HGB 15.1 11/30/2020 10:37 AM    HCT 46.4 11/30/2020 10:37 AM    PLATELET 076 80/46/9664 10:37 AM    MCV 88.7 11/30/2020 10:37 AM       Lab Results   Component Value Date/Time    Microalbumin/Creat ratio (mg/g creat) 22 11/30/2020 10:37 AM    Microalbumin,urine random 6.02 11/30/2020 10:37 AM       Lab Results   Component Value Date/Time    Hemoglobin A1c 8.9 (H) 05/20/2021 11:19 AM    Hemoglobin A1c 8.5 (H) 11/30/2020 10:37 AM    Hemoglobin A1c 10.1 (H) 02/26/2020 09:28 AM     No results found for: [id number]     CrCl cannot be calculated (Unknown ideal weight. ). A/P:    1) T2DM: Per ADA guidelines, Pt's A1c is not at goal of < 7%.   Current SMBG(s)/CGM trend continues to be elevated. Pt checks infrequently. In office BG was fasting and significantly elevated. Meals continue to be high carbs. Pt is walking most nights but it is not moderate intensity physical activity. Has gained 5 lbs in the past month. Pt on max clinically effective dose of Metformin. Not on max dose of Glimepiride. Med cost has prevented addition of weight neutral/reducing meds. Pt self stopped Pioglitazone as he thought itching spots were d/t med. This was likely d/t mosquitos. Pt encouraged to restart new med. - RESTART Pioglitazone 15 mg every day  - Continue Metformin 1000 mg BID  - Continue 2 mg QAM  - Encouraged low carb meals  - Encouraged moderate physical activity - 150 min/week  - Encouraged 7% weight loss (14 lbs)    2) HTN: BP is at goal of < 130/80. Currently taking ACEi. No evidence of microalbuminuria. - Continue Lisinopril 10 mg every day      3) Primary Prevention ASCVD: Per ADA guidelines, pts age 43-69 yrs are recommended for statin therapy. Currently taking moderate intensity statin. LDL >100 mg/dL. Pt's 10 yr ASCVD risk score indicates statin intensity is appropriate. Will continue at this time, but may increase dose in order to decrease LDL in future. - Continue Atorvastatin 10 mg every day     4) Nodules: Pt concerned for nodules on arms. - Referred pt back to PCP for evaluation       Established patient-centered goal(s) to follow up on at the next visit:    - 7% weight reduction (14 lbs)  - Decrease rice to 1/4 of your plate  - Increase vegetables to 1/2 of your plate  - Decrease beers to 2-3 per week  - Increase physical activity to moderate exercise - 150 minutes/week    Check: Vitals, Weight, HbA1c and Medication Adherence at the next visit. Medication reconciliation was completed during the visit. There are no discontinued medications. No orders of the defined types were placed in this encounter.       Patient verbalized understanding of the information presented and all of the patients questions were answered. AVS was handed to the patient. Patient advised to call the office with any additional questions or concerns. Notifications of recommendations will be sent to Dr. Linn Modi MD for review. Patient will return to clinic in 8 week(s) for follow up. Bharati Bernabe PharmD, [facility] [place]  Clinical Pharmacist Specialist          For Pharmacy Admin Tracking Only     CPA in place:  Yes   Recommendation Provided To: Patient/Caregiver: 2 via In person   Intervention Detail: Adherence Monitorin and Scheduled Appointment   Gap Closed?: No   Total # of Interventions Recommended: 2   Total # of Interventions Accepted: 2   Intervention Accepted By: Patient/Caregiver: 2   Time Spent (min): 45

## 2021-07-02 NOTE — PATIENT INSTRUCTIONS
Your A1c was 8.9% which was elevated.     Blood Sugar Goal  Fastin-130 mg/dL  1-2 after meals: Less than 180 mg/dL    Carbohydrate Goals  Meal: 30-45 grams   Snacks: 15 grams    Personal Goals:  - 7% weight reduction (14 lbs)  - Decrease rice to 1/4 of your plate  - Increase vegetables to 1/2 of your plate  - Decrease beers to 2-3 per week  - Increase physical activity to moderate exercise - 150 minutes/week    Pharmacist Contact Information:  Edward JavierD, [facility] [place]  Work Cell: [phone]

## 2021-07-08 NOTE — PROGRESS NOTES
Called patient. Two patient identifiers verified. Discussed lab results. He Verbalized understanding. Manual Repair Warning Statement: We plan on removing the manually selected variable below in favor of our much easier automatic structured text blocks found in the previous tab. We decided to do this to help make the flow better and give you the full power of structured data. Manual selection is never going to be ideal in our platform and I would encourage you to avoid using manual selection from this point on, especially since I will be sunsetting this feature. It is important that you do one of two things with the customized text below. First, you can save all of the text in a word file so you can have it for future reference. Second, transfer the text to the appropriate area in the Library tab. Lastly, if there is a flap or graft type which we do not have you need to let us know right away so I can add it in before the variable is hidden. No need to panic, we plan to give you roughly 6 months to make the change.

## 2021-08-06 DIAGNOSIS — E78.5 DYSLIPIDEMIA, GOAL LDL BELOW 100: ICD-10-CM

## 2021-08-06 DIAGNOSIS — Z82.49 FAMILY HISTORY OF EARLY CAD: ICD-10-CM

## 2021-08-06 RX ORDER — ATORVASTATIN CALCIUM 10 MG/1
TABLET, FILM COATED ORAL
Qty: 30 TABLET | Refills: 0 | Status: SHIPPED | OUTPATIENT
Start: 2021-08-06 | End: 2021-09-09

## 2021-08-12 ENCOUNTER — OFFICE VISIT (OUTPATIENT)
Dept: FAMILY MEDICINE CLINIC | Age: 46
End: 2021-08-12
Payer: COMMERCIAL

## 2021-08-12 VITALS
WEIGHT: 199.6 LBS | BODY MASS INDEX: 27.94 KG/M2 | DIASTOLIC BLOOD PRESSURE: 78 MMHG | OXYGEN SATURATION: 97 % | TEMPERATURE: 98 F | HEART RATE: 68 BPM | RESPIRATION RATE: 15 BRPM | HEIGHT: 71 IN | SYSTOLIC BLOOD PRESSURE: 118 MMHG

## 2021-08-12 DIAGNOSIS — I10 ESSENTIAL HYPERTENSION: ICD-10-CM

## 2021-08-12 DIAGNOSIS — E11.9 CONTROLLED TYPE 2 DIABETES MELLITUS WITHOUT COMPLICATION, WITHOUT LONG-TERM CURRENT USE OF INSULIN (HCC): Primary | ICD-10-CM

## 2021-08-12 DIAGNOSIS — Z82.49 FAMILY HISTORY OF EARLY CAD: ICD-10-CM

## 2021-08-12 DIAGNOSIS — E78.5 DYSLIPIDEMIA, GOAL LDL BELOW 100: ICD-10-CM

## 2021-08-12 LAB
ALBUMIN SERPL-MCNC: 4.2 G/DL (ref 3.5–5)
ALBUMIN/GLOB SERPL: 1.1 {RATIO} (ref 1.1–2.2)
ALP SERPL-CCNC: 64 U/L (ref 45–117)
ALT SERPL-CCNC: 59 U/L (ref 12–78)
ANION GAP SERPL CALC-SCNC: 4 MMOL/L (ref 5–15)
AST SERPL-CCNC: 23 U/L (ref 15–37)
BILIRUB SERPL-MCNC: 0.7 MG/DL (ref 0.2–1)
BUN SERPL-MCNC: 10 MG/DL (ref 6–20)
BUN/CREAT SERPL: 14 (ref 12–20)
CALCIUM SERPL-MCNC: 8.6 MG/DL (ref 8.5–10.1)
CHLORIDE SERPL-SCNC: 104 MMOL/L (ref 97–108)
CHOLEST SERPL-MCNC: 175 MG/DL
CO2 SERPL-SCNC: 29 MMOL/L (ref 21–32)
CREAT SERPL-MCNC: 0.72 MG/DL (ref 0.7–1.3)
EST. AVERAGE GLUCOSE BLD GHB EST-MCNC: 203 MG/DL
GLOBULIN SER CALC-MCNC: 3.7 G/DL (ref 2–4)
GLUCOSE SERPL-MCNC: 173 MG/DL (ref 65–100)
HBA1C MFR BLD: 8.7 % (ref 4–5.6)
HDLC SERPL-MCNC: 45 MG/DL
HDLC SERPL: 3.9 {RATIO} (ref 0–5)
LDLC SERPL CALC-MCNC: 102.4 MG/DL (ref 0–100)
POTASSIUM SERPL-SCNC: 4.3 MMOL/L (ref 3.5–5.1)
PROT SERPL-MCNC: 7.9 G/DL (ref 6.4–8.2)
SODIUM SERPL-SCNC: 137 MMOL/L (ref 136–145)
TRIGL SERPL-MCNC: 138 MG/DL (ref ?–150)
VLDLC SERPL CALC-MCNC: 27.6 MG/DL

## 2021-08-12 PROCEDURE — 99214 OFFICE O/P EST MOD 30 MIN: CPT | Performed by: FAMILY MEDICINE

## 2021-08-12 PROCEDURE — 3052F HG A1C>EQUAL 8.0%<EQUAL 9.0%: CPT | Performed by: FAMILY MEDICINE

## 2021-08-12 RX ORDER — EMPAGLIFLOZIN, METFORMIN HYDROCHLORIDE 25; 1000 MG/1; MG/1
1 TABLET, EXTENDED RELEASE ORAL
Qty: 30 EACH | Refills: 0 | Status: SHIPPED | OUTPATIENT
Start: 2021-08-12 | End: 2021-08-27

## 2021-08-12 NOTE — PROGRESS NOTES
Chief Complaint   Patient presents with    Diabetes     3 months follow up       1. Have you been to the ER, urgent care clinic since your last visit? Hospitalized since your last visit? No    2. Have you seen or consulted any other health care providers outside of the [address] [place] [address] since your last visit? Include any pap smears or colon screening.  No        3 most recent PHQ Screens 8/12/2021   Little interest or pleasure in doing things Not at all   Feeling down, depressed, irritable, or hopeless Not at all   Total Score PHQ 2 0       Health Maintenance Due   Topic Date Due    Hepatitis C Screening  Never done    DTaP/Tdap/Td series (1 - Tdap) Never done    Colorectal Cancer Screening Combo  Never done     Pt didn't have a colonoscopy recently

## 2021-08-12 NOTE — ASSESSMENT & PLAN NOTE
borderline controlled, changes made today: Medicines were adjusted, medication adherence emphasized, lifestyle modifications recommended

## 2021-08-12 NOTE — PROGRESS NOTES
HISTORY OF PRESENT ILLNESS  Ann Drew is a 55 y.o. male. Patient was seen today for follow-up on uncontrolled diabetes, dyslipidemia and hypertension. Has been noncompliant with medications and he stopped his medications without doctors recommendations. HPI  Endocrine Review  He is seen for diabetes.  Since last visit he reports: no polyuria or polydipsia, no chest pain, dyspnea or TIA's, no numbness, tingling or pain in extremities, no unusual visual symptoms, no hypoglycemia, no medication side effects noted, weight has decreased, no significant changes.  Testing: is not performed.  He reports medication compliance: taking Metformin one time only.  Medication side effects: none.  Diabetic diet compliance: compliant all of the time.  Lab review:   After last visit, his Metformin dose was increased to 1 g twice daily and glipizide was changed to glimepiride to improve compliance. He has problem with insurance approval.  We tried several medications including Arville Bougie and had a problem getting prior authorization from insurance. On last visit with Pharm. D. he was started on pioglitazone, he stopped it due to concern of itching and weight gain. Cardiovascular Review  The patient has hypertension, hyperlipidemia and Family h/o CAD.   He reports taking medications as instructed, no medication side effects noted, patient does not perform home BP monitoring, no chest pain on exertion, no dyspnea on exertion, no orthostatic dizziness or lightheadedness, no orthopnea or paroxysmal nocturnal dyspnea, no palpitations, no intermittent claudication symptoms.  Diet and Lifestyle: generally follows a low fat low cholesterol diet, generally follows a low sodium diet, exercises regularly, nonsmoker.  Lab review: no lab studies available for review at time of visit.    Medicines: Lisinopril 10 mg and stopped atorvastatin 10 mg again.     Review of Systems   Constitutional: Negative for chills, fever and malaise/fatigue. HENT: Negative for congestion, ear pain, sore throat and tinnitus. Eyes: Negative for blurred vision, double vision, pain and discharge. Respiratory: Negative for cough, shortness of breath and wheezing. Cardiovascular: Negative for chest pain, palpitations and leg swelling. Gastrointestinal: Negative for abdominal pain, blood in stool, constipation, diarrhea, nausea and vomiting. Genitourinary: Negative for dysuria, frequency, hematuria and urgency. Musculoskeletal: Negative for back pain, joint pain and myalgias. Skin: Negative for rash. Neurological: Negative for dizziness, tremors, seizures and headaches. Endo/Heme/Allergies: Negative for polydipsia. Does not bruise/bleed easily. Psychiatric/Behavioral: Negative for depression and substance abuse. The patient is not nervous/anxious. Physical Exam  Vitals and nursing note reviewed. Constitutional:       Appearance: He is well-developed. He is not diaphoretic. HENT:      Head: Normocephalic and atraumatic. Right Ear: External ear normal.      Mouth/Throat:      Pharynx: No oropharyngeal exudate. Eyes:      General: No scleral icterus. Conjunctiva/sclera: Conjunctivae normal.      Pupils: Pupils are equal, round, and reactive to light. Neck:      Thyroid: No thyromegaly. Vascular: No JVD. Cardiovascular:      Rate and Rhythm: Normal rate and regular rhythm. Heart sounds: Normal heart sounds. No murmur heard. Pulmonary:      Effort: Pulmonary effort is normal.      Breath sounds: Normal breath sounds. No wheezing. Abdominal:      General: Bowel sounds are normal. There is no distension. Palpations: Abdomen is soft. There is no mass. Musculoskeletal:         General: No tenderness. Normal range of motion. Cervical back: Normal range of motion and neck supple. Lymphadenopathy:      Cervical: No cervical adenopathy. Skin:     General: Skin is warm and dry. Findings: No rash. Neurological:      Mental Status: He is alert and oriented to person, place, and time. Cranial Nerves: No cranial nerve deficit. Deep Tendon Reflexes: Reflexes are normal and symmetric. Reflexes normal.         ASSESSMENT and PLAN  Diagnoses and all orders for this visit:    1. Controlled type 2 diabetes mellitus without complication, without long-term current use of insulin ([facility])  Assessment & Plan:   borderline controlled, changes made today: Medicines were adjusted, medication adherence emphasized, lifestyle modifications recommended    Orders:  -     empagliflozin-metformin (Synjardy XR) 25-1,000 mg TBph; Take 1 Tablet by mouth daily (with breakfast). -     LIPID PANEL; Future  -     METABOLIC PANEL, COMPREHENSIVE; Future  -     HEMOGLOBIN A1C WITH EAG; Future    2. Essential hypertension  -     METABOLIC PANEL, COMPREHENSIVE; Future    3. Dyslipidemia, goal LDL below 100  -     LIPID PANEL; Future  -     METABOLIC PANEL, COMPREHENSIVE; Future    4. Family history of early CAD  -     LIPID PANEL; Future    Again we had a long discussion on his underlying risk factors for cardiovascular adverse events. Strongly recommended to be compliant with medicines and exercise. Gave him a coupon of Synjardy XR and to update me in 2 weeks with home sugar readings. Discussed lifestyle issues and health guidance given  Patient was given an after visit summary which includes diagnoses, vital signs, current medications, instructions and references & authorized prescriptions . Results of labs will be conveyed to patient, once available. Pt verbalized instructions I provided and expressed understanding of discussion that was held today. Follow-up and Dispositions    · Return in about 3 months (around 11/12/2021) for follow up, fasting. Please note that this dictation was completed with [employer] the [employer] voice recognition software.   Quite often unanticipated grammatical, syntax, homophones, and other interpretive errors are inadvertently transcribed by the computer software. Please disregard these errors. Please excuse any errors that have escaped final proofreading. Thank you. Carina Mendez

## 2021-08-12 NOTE — PATIENT INSTRUCTIONS
Noninsulin Medicines for Type 2 Diabetes: Care Instructions  Overview     There are different types of noninsulin medicines for diabetes. Each works in a different way. But they all help you control your blood sugar. Some types help your body make insulin to lower your blood sugar. Others lower how much insulin your body needs. Some can slow how fast your body digests sugars. And some can remove extra glucose through your urine. You may need to take more than one medicine for diabetes. Two or more medicines may work better to lower your blood sugar level than just one does. · Metformin. This lowers how much glucose your liver makes. And it helps you respond better to insulin. It also lowers the amount of stored sugar that your liver releases when you are not eating. · Sulfonylureas. These help your body release more insulin. Some work for many hours. They can cause low blood sugar if you don't eat as you planned. An example is glipizide. · Thiazolidinediones. These reduce the amount of blood glucose. They also help you respond better to insulin. An example is pioglitazone. · SGLT2 inhibitors. These help to remove extra glucose through your urine. They may also help some people lose weight. An example is ertugliflozin. · DPP-4 inhibitors. These help your body raise the level of insulin after you eat. They also help your body make less of a hormone that raises blood sugar. An example is alogliptin. · Incretin hormones (GLP-1 receptor agonists). These help your body make a protein that can raise your insulin level and make you less hungry. They're given as shots or pills. An example is semaglutide. · Meglitinides. These help your body release insulin. They also help slow how your body digests sugars. So they can keep your blood sugar from rising too fast after you eat. · Alpha-glucosidase inhibitors. These keep starches from breaking down.  This means that they lower the amount of glucose absorbed when you eat. They don't help your body make more insulin. So they will not cause low blood sugar unless you use them with other medicines for diabetes. Follow-up care is a key part of your treatment and safety. Be sure to make and go to all appointments, and call your doctor if you are having problems. It's also a good idea to know your test results and keep a list of the medicines you take. How can you care for yourself at home? · Eat a healthy diet. Get some exercise each day. This may help you to reduce how much medicine you need. · Do not take other prescription or over-the-counter medicines, vitamins, herbal products, or supplements without talking to your doctor first. Some medicines for type 2 diabetes can cause problems with other medicines or supplements. · Tell your doctor if you plan to get pregnant. Some of these drugs are not safe for pregnant women. · Be safe with medicines. Take your medicines exactly as prescribed. Meglitinides and sulfonylureas can cause your blood sugar to drop very low. Call your doctor if you think you are having a problem with your medicine. · Check your blood sugar often. You can use a glucose monitor. Keeping track can help you know how certain foods, activities, and medicines affect your blood sugar. And it can help you keep your blood sugar from getting so low that it's not safe. When should you call for help? Call 911 anytime you think you may need emergency care. For example, call if:    · You passed out (lost consciousness).     · You are confused or cannot think clearly.     · Your blood sugar is very high or very low. Watch closely for changes in your health, and be sure to contact your doctor if:    · Your blood sugar stays outside the level your doctor set for you.     · You have any problems. Where can you learn more?   Go to http://www.gray.com/  Enter H153 in the search box to learn more about \"Noninsulin Medicines for Type 2 Diabetes: Care Instructions. \"  Current as of: August 31, 2020               Content Version: 12.8  © 8241-3332 Healthwise, Incorporated. Care instructions adapted under license by [facility]` (which disclaims liability or warranty for this information). If you have questions about a medical condition or this instruction, always ask your healthcare professional. [place] [address] any warranty or liability for your use of this information.

## 2021-08-13 NOTE — PROGRESS NOTES
Ash Stoddard,  I have reviewed your results. Not much improvement from previous results. You have stop your pioglitazone so you are only on Metformin and glimepiride. As we discussed to start on medication that I gave you discount coupon. Cholesterol results have improved, as we discussed making sure you take your atorvastatin, no matter what time you take. Kidney and liver functions are normal, though fasting sugar is very high. You need to take all these numbers very seriously as we discussed as uncontrolled diabetes increases risk of significant complications later in life. If the numbers stay high, option will be to consider injectable. Let me know question, thanks.

## 2021-08-13 NOTE — PROGRESS NOTES
Called patient. Two patient identifiers verified. Discussed lab results per provider's note. He Verbalized understanding. Advised to take Metformin, glimepiride and new Rx sent to the pharmacy.

## 2021-08-23 ENCOUNTER — TELEPHONE (OUTPATIENT)
Dept: FAMILY MEDICINE CLINIC | Age: 46
End: 2021-08-23

## 2021-08-23 NOTE — TELEPHONE ENCOUNTER
Pharmacy Progress Note - Telephone Encounter    S/O: Mr. Lord President 55 y.o. male, referred by Dr. Cruzito Zapata MD, was contacted via an outbound telephone call to discuss pt's upcoming appt today. Verified patients identifiers (name & ) per HIPAA policy. - Pt called this writer to ask about date/time ofhis upcoming PharmD DM appt    A/P:  - Pt informed appt is scheduled 21 at [address] [place] [address]  - Patient endorses understanding to the provided information. All questions answered at this time. There are no discontinued medications. No orders of the defined types were placed in this encounter.       Vargas Weiner PharmD, BCGP  Clinical Pharmacist Specialist      For Pharmacy Admin Tracking Only     Time Spent (min): 5

## 2021-08-27 ENCOUNTER — OFFICE VISIT (OUTPATIENT)
Dept: FAMILY MEDICINE CLINIC | Age: 46
End: 2021-08-27

## 2021-08-27 VITALS
HEART RATE: 75 BPM | BODY MASS INDEX: 27.87 KG/M2 | SYSTOLIC BLOOD PRESSURE: 124 MMHG | WEIGHT: 199.8 LBS | DIASTOLIC BLOOD PRESSURE: 85 MMHG

## 2021-08-27 DIAGNOSIS — E11.9 CONTROLLED TYPE 2 DIABETES MELLITUS WITHOUT COMPLICATION, WITHOUT LONG-TERM CURRENT USE OF INSULIN (HCC): ICD-10-CM

## 2021-08-27 RX ORDER — GLIMEPIRIDE 4 MG/1
4 TABLET ORAL
Qty: 90 TABLET | Refills: 1 | Status: SHIPPED | OUTPATIENT
Start: 2021-08-27 | End: 2021-10-08

## 2021-08-27 NOTE — PATIENT INSTRUCTIONS
Your A1c was 8.7% which was elevated. STOP Synjardy XR due to cost    INCREASE Glimepiride to 4 mg every morning    Separate your Metformin doses so they are approximately 12 hours apart.     Check your blood sugars - fasting and 1-2 hours after a meal    Blood Sugar Goal  Fastin-130 mg/dL  1-2 after meals: Less than 180 mg/dL    Carbohydrate Goals  Meal: 30-45 grams   Snacks: 15 grams

## 2021-08-27 NOTE — PROGRESS NOTES
Pharmacy Progress Note - Diabetes Management    S/O: Mr. Vee Damian is a 55 y.o. male, referred by Dr. Darryn Abdi MD, with a PMH of T2DM, was seen today for diabetes management. Patient's last A1c was 8.7% (Aug 2021) which was slightly decreased from A1c 8.9% (May 2021) which was elevated from A1c 8.5% (Nov 2020). Interim update: Pt was last seen by PCP on 8/12/21 for f/up on DM. During visit, pt stated that he had self stopped Pioglitazone d/t concern for itching and weight gain. PCP provided coupon for Synjardy XR. He has tried several meds - Januvia, [place], and Invokana. They were not viable options d/t insurance coverage. Discussed pathophysiology of DM, complications, progression, BG/A1c goals. Discussed medications - mechanism of action, side effects, efficacy. Discussed lab results with pt. 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   Branded meds are very expensive.  - PCP provided coupon brought down cost of Synjardy to $600/mo  - Was provided coupon for a 14 day supply sample  - Pioglitazone was affordable but pt experienced weight gain with med - he does not want to go back on  - Pt declines insulin as an option at this time  - Taking Metformin at 11AM and 4PM    Current anti-hyperglycemic regimen includes:    - Synjardy XR  mg every day  - Glimepiride 2 mg QAM  - Metformin 1000 mg BID    ROS:  Today, Pt endorses:  - Symptoms of Hyperglycemia: none  - Symptoms of Hypoglycemia: none    Self Monitoring Blood Glucose (SMBG) or CGM:  - Brought in home glucometer/blood glucose log/CGM reader today:  no  - Checks BG: rarely   - Fasting SMBG today: 173 mg/dL (checked in office)  - Recalls fasting BG rdgs in 180s    Nutrition:  - Sodas - once a week  - Decreased/elimited rice    Physical Activity:   yes  - Consists of walking 1 hr per day, moderate intensity    Diabetes Health Maintenance:  · ASCVD Risk Factors: High LDL, Blood Pressure and Diabetes  · ASA therapy:  none   · ACE/ARB therapy: Lisinopril 10 mg  · Optimized statin therapy: Atorvastatin 10 mg  · The 10-year ASCVD risk score (Estephanie Driver et al., 2013) is: 4%    · LDL: 102.4 mg/dL (8/12/21)  · Nicotine dependence: No  · Last eye exam: 11/30/20  · Last foot exam: 5/20/21  · Last influenza vaccine: discussed recommendation - every year  · Last Pneumovax 23 vaccine: 11/30/20  · Last Prevnar-13 vaccine: will discuss at future visit  · Hepatitis B Series: will discuss at future visit   · COVID-19 vaccine: 4/22/21, 5/13/21      Vitals: Wt Readings from Last 3 Encounters:   08/12/21 199 lb 9.6 oz (90.5 kg)   07/02/21 203 lb 3.2 oz (92.2 kg)   06/11/21 198 lb 3.2 oz (89.9 kg)     BP Readings from Last 3 Encounters:   08/12/21 118/78   07/02/21 122/84   06/11/21 (!) 125/92     Pulse Readings from Last 3 Encounters:   08/12/21 68   07/02/21 73   05/20/21 81       Past Medical History:   Diagnosis Date    [facility]     Hypertension      No Known Allergies    Current Outpatient Medications   Medication Sig    empagliflozin-metformin (Synjardy XR) 25-1,000 mg TBph Take 1 Tablet by mouth daily (with breakfast).  atorvastatin (LIPITOR) 10 mg tablet TAKE ONE TABLET BY MOUTH EVERY NIGHT AT BEDTIME (Patient not taking: Reported on 8/12/2021)    lisinopriL (PRINIVIL, ZESTRIL) 10 mg tablet TAKE ONE TABLET BY MOUTH DAILY. PLEASE MAKE AN APPOINTMENT.  pioglitazone (ACTOS) 15 mg tablet Take 1 Tablet by mouth daily. (Patient not taking: Reported on 7/2/2021)    metFORMIN (GLUCOPHAGE) 1,000 mg tablet TAKE ONE TABLET BY MOUTH TWICE A DAY WITH MEALS **FAR DUE FOR FOLLOW UP    glimepiride (AMARYL) 2 mg tablet Take 1 Tablet by mouth every morning. No current facility-administered medications for this visit.      Lab Results   Component Value Date/Time    Sodium 137 08/12/2021 08:51 AM    Potassium 4.3 08/12/2021 08:51 AM    Chloride 104 08/12/2021 08:51 AM CO2 29 08/12/2021 08:51 AM    Anion gap 4 (L) 08/12/2021 08:51 AM    Glucose 173 (H) 08/12/2021 08:51 AM    BUN 10 08/12/2021 08:51 AM    Creatinine 0.72 08/12/2021 08:51 AM    BUN/Creatinine ratio 14 08/12/2021 08:51 AM    GFR est AA >60 08/12/2021 08:51 AM    GFR est non-AA >60 08/12/2021 08:51 AM    Calcium 8.6 08/12/2021 08:51 AM    Bilirubin, total 0.7 08/12/2021 08:51 AM    Alk. phosphatase 64 08/12/2021 08:51 AM    Protein, total 7.9 08/12/2021 08:51 AM    Albumin 4.2 08/12/2021 08:51 AM    Globulin 3.7 08/12/2021 08:51 AM    A-G Ratio 1.1 08/12/2021 08:51 AM    ALT (SGPT) 59 08/12/2021 08:51 AM     Lab Results   Component Value Date/Time    Cholesterol, total 175 08/12/2021 08:51 AM    HDL Cholesterol 45 08/12/2021 08:51 AM    LDL, calculated 102.4 (H) 08/12/2021 08:51 AM    VLDL, calculated 27.6 08/12/2021 08:51 AM    Triglyceride 138 08/12/2021 08:51 AM    CHOL/HDL Ratio 3.9 08/12/2021 08:51 AM     Lab Results   Component Value Date/Time    WBC 5.0 11/30/2020 10:37 AM    HGB 15.1 11/30/2020 10:37 AM    HCT 46.4 11/30/2020 10:37 AM    PLATELET 721 10/01/9877 10:37 AM    MCV 88.7 11/30/2020 10:37 AM       Lab Results   Component Value Date/Time    Microalbumin/Creat ratio (mg/g creat) 22 11/30/2020 10:37 AM    Microalbumin,urine random 6.02 11/30/2020 10:37 AM       Lab Results   Component Value Date/Time    Hemoglobin A1c 8.7 (H) 08/12/2021 08:51 AM    Hemoglobin A1c 8.9 (H) 05/20/2021 11:19 AM    Hemoglobin A1c 8.5 (H) 11/30/2020 10:37 AM     No results found for: [id number]     CrCl cannot be calculated (Unknown ideal weight. ). A/P:    1) T2DM: Per ADA guidelines, Pt's A1c is not at goal of < 7%. Current SMBG(s)/CGM trend is relatively unknown. Reported rdgs are uncontrolled. Fasting rdg in office is uncontrolled. Pt is unable to afford branded meds d/t high deductible insurance plan. Currently taking over max dose of Metformin with Synjardy and Metformin. Denies side effects.   Sergio Oconnell is not a sustainable financial choice. Does not tolerate Pioglitazone. Declines insulin. Glimepiride dose can be titrated up. Pt may benefit from  Metformin IR dose more in order to better cover fasting BGs.  - STOP Synjardy  - INCREASE Glimepiride to 4 mg every day   - Continue Metformin 1000 mg BID - separate doses by approx 12 hours  - Encouraged low carb diet  - Encouraged physical activity  - Encouraged BG monitoring    2) HTN: BP is at goal of < 130/80. Currently taking ACEi.  No evidence of microalbuminuria.    - Continue Lisinopril 10 mg every day      3) Primary Prevention ASCVD: Per ADA guidelines, pts age 43-69 yrs are recommended for statin therapy. Currently taking moderate intensity statin. LDL has improved from last lab draw but continues to be >100 mg/dL. Pt's 10 yr ASCVD risk score indicates statin intensity is appropriate.  Will continue at this time, but may increase dose in order to decrease LDL in future. - Continue Atorvastatin 10 mg every day       Medication reconciliation was completed during the visit. Medications Discontinued During This Encounter   Medication Reason    pioglitazone (ACTOS) 15 mg tablet Side Effects    empagliflozin-metformin (Synjardy XR) 25-1,000 mg [facility] Cost of Medication    glimepiride (AMARYL) 2 mg tablet      Orders Placed This Encounter    glimepiride (AMARYL) 4 mg tablet     Sig: Take 1 Tablet by mouth every morning. Dispense:  90 Tablet     Refill:  1       Patient verbalized understanding of the information presented and all of the patients questions were answered. AVS was handed to the patient. Patient advised to call the office with any additional questions or concerns. Notifications of recommendations will be sent to Dr. Darryn Abdi MD for review. Patient will return to clinic in 6 week(s) for follow up.      Noé See, PharmD, BCGP  Clinical Pharmacist Specialist          For Pharmacy Admin Tracking Only    [facility] CPA in place:  Yes   Recommendation Provided To: Patient/Caregiver: 4 via In person   Intervention Detail: Discontinued Rx: 2, reason: MARIA DEL ROSARIO and Cost/Formulary Change, Dose Adjustment: 1, reason: Therapy Optimization and Scheduled Appointment   Gap Closed?: No   Intervention Accepted By: Patient/Caregiver: 4   Time Spent (min): 30

## 2021-10-08 ENCOUNTER — OFFICE VISIT (OUTPATIENT)
Dept: FAMILY MEDICINE CLINIC | Age: 46
End: 2021-10-08

## 2021-10-08 VITALS
DIASTOLIC BLOOD PRESSURE: 88 MMHG | BODY MASS INDEX: 27.59 KG/M2 | HEART RATE: 79 BPM | WEIGHT: 197.8 LBS | SYSTOLIC BLOOD PRESSURE: 126 MMHG

## 2021-10-08 DIAGNOSIS — E11.65 TYPE 2 DIABETES MELLITUS WITH HYPERGLYCEMIA, WITHOUT LONG-TERM CURRENT USE OF INSULIN (HCC): Primary | ICD-10-CM

## 2021-10-08 DIAGNOSIS — E11.9 CONTROLLED TYPE 2 DIABETES MELLITUS WITHOUT COMPLICATION, WITHOUT LONG-TERM CURRENT USE OF INSULIN (HCC): ICD-10-CM

## 2021-10-08 RX ORDER — GLIMEPIRIDE 2 MG/1
TABLET ORAL
Qty: 90 TABLET | Refills: 1 | Status: SHIPPED | OUTPATIENT
Start: 2021-10-08 | End: 2021-12-17 | Stop reason: SDUPTHER

## 2021-10-08 NOTE — PATIENT INSTRUCTIONS
Your A1c was 8.7% which was elevated. INCREASE Glimepiride to 4 mg every morning and 2 mg every evening before meals. CHECK your blood sugar 3x per week. Bring in the numbers to your next visit. LIMIT meals after 9PM.    DECREASE starches - breads, rices, potatoes  INCLUDE a protein with your meals.       Look into your insurance and the cost of these medications:  - Ozempic 0.25 mg once a week  - Trulicity 3.79 mg once a week  - Jardiance 10 mg once a day    Blood Sugar Goal  Fastin-130 mg/dL  1-2 after meals: Less than 180 mg/dL    Carbohydrate Goals  Meal: 30-45 grams   Snacks: 15 grams

## 2021-10-08 NOTE — PROGRESS NOTES
Pharmacy Progress Note - Diabetes Management    S/O: Mr. Gene Morrison is a 55 y.o. male, referred by Dr. Francisco Javier Rea MD, with a PMH of T2DM, was seen today for diabetes management. Patient's last A1c was 8.7% (Aug 2021) which was slightly decreased from A1c 8.9% (May 2021) which was elevated from A1c 8.5% (Nov 2020). Interim update: Pt was last seen by this writer on 8/27/21 for f/up on DM. Pt had stopped Synjardy d/t cost and increased Glimepiride dose. Pt was instructed to separate Metformin dose by 12 hours - he had been  it by approx 5 hours. Today, pt arrives to clinic for f/up on med changes. Pt states he did not separate Metformin doses. SHx:  -  for [employer]    Medication Adherence/Access:  - Pt has a $1800 Rx deductible.   Branded meds are very expensive.  - PCP provided coupon brought down cost of Synjardy to $600/mo  - Was provided coupon for a 14 day supply sample - this was not a sustainable options  - Pioglitazone was affordable but pt experienced weight gain with med - he does not want to go back on  - Pt declines insulin as an option at this time  - Taking Metformin at 11AM and 4PM    Current anti-hyperglycemic regimen includes:    - Glimepiride to 4 mg every day   - Continue Metformin 1000 mg BID - separate doses by approx 12 hours    ROS:  Today, Pt endorses:  - Symptoms of Hyperglycemia: none  - Symptoms of Hypoglycemia: none    Self Monitoring Blood Glucose (SMBG) or CGM:  - Brought in home glucometer/blood glucose log/CGM reader today:  no  Checked BG once - 180s fasting   Fasting BG today - 190 mg/dL (in office)    Nutrition:  - Dinner last night: Roman and chickpeas at 11PM    Physical Activity:   yes  - Consists of walking    Diabetes Health Maintenance:  · ASCVD Risk Factors: High LDL, Blood Pressure and Diabetes  · ASA therapy:  none   · ACE/ARB therapy: Lisinopril 10 mg  · Optimized statin therapy: Atorvastatin 10 mg  · The 10-year ASCVD risk score (Shayla Ross et al., 2013) is: 4.4%    · LDL: 102.4 mg/dL (8/12/21)  · Nicotine dependence: No  · Last eye exam: 11/30/20  · Last foot exam: 5/20/21  · Last influenza vaccine: discussed recommendation - every year  · Last Pneumovax 23 vaccine: 11/30/20  · Last Prevnar-13 vaccine: will discuss at future visit  · Hepatitis B Series: will discuss at future visit   · COVID-19 vaccine: 4/22/21, 5/13/21      Vitals: Wt Readings from Last 3 Encounters:   08/27/21 199 lb 12.8 oz (90.6 kg)   08/12/21 199 lb 9.6 oz (90.5 kg)   07/02/21 203 lb 3.2 oz (92.2 kg)     BP Readings from Last 3 Encounters:   08/27/21 124/85   08/12/21 118/78   07/02/21 122/84     Pulse Readings from Last 3 Encounters:   08/27/21 75   08/12/21 68   07/02/21 73       Past Medical History:   Diagnosis Date    Diabetes ([facility] [address]     Hypertension      No Known Allergies    Current Outpatient Medications   Medication Sig    atorvastatin (LIPITOR) 10 mg tablet TAKE ONE TABLET BY MOUTH EVERY NIGHT AT BEDTIME    glimepiride (AMARYL) 4 mg tablet Take 1 Tablet by mouth every morning.  lisinopriL (PRINIVIL, ZESTRIL) 10 mg tablet TAKE ONE TABLET BY MOUTH DAILY. PLEASE MAKE AN APPOINTMENT.  metFORMIN (GLUCOPHAGE) 1,000 mg tablet TAKE ONE TABLET BY MOUTH TWICE A DAY WITH MEALS **FAR DUE FOR FOLLOW UP     No current facility-administered medications for this visit. Lab Results   Component Value Date/Time    Sodium 137 08/12/2021 08:51 AM    Potassium 4.3 08/12/2021 08:51 AM    Chloride 104 08/12/2021 08:51 AM    CO2 29 08/12/2021 08:51 AM    Anion gap 4 (L) 08/12/2021 08:51 AM    Glucose 173 (H) 08/12/2021 08:51 AM    BUN 10 08/12/2021 08:51 AM    Creatinine 0.72 08/12/2021 08:51 AM    BUN/Creatinine ratio 14 08/12/2021 08:51 AM    GFR est AA >60 08/12/2021 08:51 AM    GFR est non-AA >60 08/12/2021 08:51 AM    Calcium 8.6 08/12/2021 08:51 AM    Bilirubin, total 0.7 08/12/2021 08:51 AM    Alk.  phosphatase 64 08/12/2021 08:51 AM Protein, total 7.9 08/12/2021 08:51 AM    Albumin 4.2 08/12/2021 08:51 AM    Globulin 3.7 08/12/2021 08:51 AM    A-G Ratio 1.1 08/12/2021 08:51 AM    ALT (SGPT) 59 08/12/2021 08:51 AM     Lab Results   Component Value Date/Time    Cholesterol, total 175 08/12/2021 08:51 AM    HDL Cholesterol 45 08/12/2021 08:51 AM    LDL, calculated 102.4 (H) 08/12/2021 08:51 AM    VLDL, calculated 27.6 08/12/2021 08:51 AM    Triglyceride 138 08/12/2021 08:51 AM    CHOL/HDL Ratio 3.9 08/12/2021 08:51 AM     Lab Results   Component Value Date/Time    WBC 5.0 11/30/2020 10:37 AM    HGB 15.1 11/30/2020 10:37 AM    HCT 46.4 11/30/2020 10:37 AM    PLATELET 354 59/10/7655 10:37 AM    MCV 88.7 11/30/2020 10:37 AM       Lab Results   Component Value Date/Time    Microalbumin/Creat ratio (mg/g creat) 22 11/30/2020 10:37 AM    Microalbumin,urine random 6.02 11/30/2020 10:37 AM       Lab Results   Component Value Date/Time    Hemoglobin A1c 8.7 (H) 08/12/2021 08:51 AM    Hemoglobin A1c 8.9 (H) 05/20/2021 11:19 AM    Hemoglobin A1c 8.5 (H) 11/30/2020 10:37 AM     No results found for: [id number]     CrCl cannot be calculated (Unknown ideal weight. ). A/P:    1) T2DM: Per ADA guidelines, Pt's A1c is not at goal of < 7%. Current SMBG(s)/CGM trend is unknown; however, his fasting BG rdgs in the office from today's and previous visits continue to be uncontrolled. Pt continues to eat higher carb meals, meals late at night, and is not  Metformin IR dose in order to better cover 24 hour time period. Discussed lifestyle modifications that could help with DM control. Pt asked about other options for DM control. Discussed that he has a high deductible plan ($1800). He has a difficult time overcoming the deductible which leaves preferred generic medications.   - INCREASE Glimepiride to 4 mg QAM and 2 mg QPM before meals  - Continue Metformin 1000 mg BID   - Instructed pt to separate Metformin dose by 12 hours  - Encouraged pt to limit meals after 9PM, low carb diet, increase protein  - Encouraged pt to check BG at home    2) HTN: BP is at goal of < 130/80. Currently taking ACEi.  No evidence of microalbuminuria.    - Continue Lisinopril 10 mg every day       3) Primary Prevention ASCVD: Per ADA guidelines, pts age 43-69 yrs are recommended for statin therapy. Currently taking moderate intensity statin. LDL has improved from last lab draw but continues to be >100 mg/dL. Pt's 10 yr ASCVD risk score indicates statin intensity is appropriate.  Will continue at this time, but may increase dose in order to decrease LDL in future. - Continue Atorvastatin 10 mg every day       Medication reconciliation was completed during the visit. Medications Discontinued During This Encounter   Medication Reason    glimepiride (AMARYL) 4 mg tablet      Orders Placed This Encounter    glimepiride (AMARYL) 2 mg tablet     Sig: Take 2 tablets by mouth in the morning and 1 tablet by mouth in the evening before meals     Dispense:  90 Tablet     Refill:  1       Patient verbalized understanding of the information presented and all of the patients questions were answered. AVS was handed to the patient. Patient advised to call the office with any additional questions or concerns. Notifications of recommendations will be sent to Dr. Bubba Rajan MD for review. Patient will return to clinic in 4 week(s) for follow up. Matthew Ibarra PharmD, [facility]  Clinical Pharmacist Specialist          For Pharmacy Admin Tracking Only     CPA in place:  Yes   Recommendation Provided To: Patient/Caregiver: 2 via In person   Intervention Detail: Dose Adjustment: 1, reason: Therapy Optimization and Scheduled Appointment   Gap Closed?: No   Intervention Accepted By: Patient/Caregiver: 2   Time Spent (min): 30

## 2021-10-29 DIAGNOSIS — I10 ESSENTIAL HYPERTENSION: ICD-10-CM

## 2021-10-29 RX ORDER — LISINOPRIL 10 MG/1
TABLET ORAL
Qty: 30 TABLET | Refills: 0 | Status: SHIPPED | OUTPATIENT
Start: 2021-10-29 | End: 2021-12-17 | Stop reason: SDUPTHER

## 2021-11-01 NOTE — TELEPHONE ENCOUNTER
Called patient. Name and  verified. Attempted to schedule for earliest available appt 11/10/21 at 3:00pm. Pt preferred a morning appt on Friday. Informed pt that provider doesn't work on Friday but there is an opening for 21 at 8:00. Pt stated he can't come on this day. Offered another appointment on 21 at 9:20 but pt refused appt. Recommended pt to call office to schedule an appt.

## 2021-12-17 DIAGNOSIS — E11.9 CONTROLLED TYPE 2 DIABETES MELLITUS WITHOUT COMPLICATION, WITHOUT LONG-TERM CURRENT USE OF INSULIN (HCC): ICD-10-CM

## 2021-12-17 DIAGNOSIS — I10 ESSENTIAL HYPERTENSION: ICD-10-CM

## 2021-12-17 RX ORDER — LISINOPRIL 10 MG/1
TABLET ORAL
Qty: 30 TABLET | Refills: 0 | Status: SHIPPED | OUTPATIENT
Start: 2021-12-17 | End: 2022-04-29

## 2021-12-17 RX ORDER — GLIMEPIRIDE 2 MG/1
TABLET ORAL
Qty: 90 TABLET | Refills: 1 | Status: SHIPPED | OUTPATIENT
Start: 2021-12-17 | End: 2022-05-27 | Stop reason: SDUPTHER

## 2022-03-18 PROBLEM — Z82.49 FAMILY HISTORY OF EARLY CAD: Status: ACTIVE | Noted: 2019-11-25

## 2022-03-18 PROBLEM — G57.11 MERALGIA PARESTHETICA OF RIGHT SIDE: Status: ACTIVE | Noted: 2019-11-25

## 2022-03-19 PROBLEM — E11.9 CONTROLLED TYPE 2 DIABETES MELLITUS WITHOUT COMPLICATION, WITHOUT LONG-TERM CURRENT USE OF INSULIN (HCC): Status: ACTIVE | Noted: 2019-11-25

## 2022-03-19 PROBLEM — Z76.89 ENCOUNTER TO ESTABLISH CARE WITH NEW DOCTOR: Status: ACTIVE | Noted: 2019-11-25

## 2022-03-19 PROBLEM — E66.3 OVER WEIGHT: Status: ACTIVE | Noted: 2019-11-25

## 2022-04-14 DIAGNOSIS — E11.9 CONTROLLED TYPE 2 DIABETES MELLITUS WITHOUT COMPLICATION, WITHOUT LONG-TERM CURRENT USE OF INSULIN (HCC): ICD-10-CM

## 2022-04-14 RX ORDER — METFORMIN HYDROCHLORIDE 1000 MG/1
TABLET ORAL
Qty: 30 TABLET | OUTPATIENT
Start: 2022-04-14

## 2022-04-14 NOTE — TELEPHONE ENCOUNTER
Patient is overdue for his follow-up on uncontrolled diabetes. He needs appointment every 3 months. Last appointment 8 months ago. No further refills. Please let him know and schedule for earliest appointment.

## 2022-04-14 NOTE — TELEPHONE ENCOUNTER
Writer spoke to pt he is scheduled to see Ace Alicea on 5/12/22 @ 9:20 am,pt would like to know if  could bridge him enough Metformin until his appt?

## 2022-04-14 NOTE — TELEPHONE ENCOUNTER
Writer just spoke with the pt and informed him that if he didn't come in for the appt on 4/20/22 @ 10:40 am then  would not approve his medication. Pt verified that he will be here for his 4/20/22 @ 10:40 am appt.

## 2022-04-20 ENCOUNTER — OFFICE VISIT (OUTPATIENT)
Dept: FAMILY MEDICINE CLINIC | Age: 47
End: 2022-04-20
Payer: COMMERCIAL

## 2022-04-20 VITALS
WEIGHT: 199.2 LBS | DIASTOLIC BLOOD PRESSURE: 77 MMHG | TEMPERATURE: 98.7 F | HEART RATE: 74 BPM | OXYGEN SATURATION: 98 % | HEIGHT: 71 IN | RESPIRATION RATE: 16 BRPM | SYSTOLIC BLOOD PRESSURE: 118 MMHG | BODY MASS INDEX: 27.89 KG/M2

## 2022-04-20 DIAGNOSIS — E11.9 CONTROLLED TYPE 2 DIABETES MELLITUS WITHOUT COMPLICATION, WITHOUT LONG-TERM CURRENT USE OF INSULIN (HCC): Primary | ICD-10-CM

## 2022-04-20 DIAGNOSIS — Z11.59 NEED FOR HEPATITIS C SCREENING TEST: ICD-10-CM

## 2022-04-20 DIAGNOSIS — Z82.49 FAMILY HISTORY OF EARLY CAD: ICD-10-CM

## 2022-04-20 DIAGNOSIS — E78.5 DYSLIPIDEMIA, GOAL LDL BELOW 100: ICD-10-CM

## 2022-04-20 DIAGNOSIS — Z12.11 SCREEN FOR COLON CANCER: ICD-10-CM

## 2022-04-20 DIAGNOSIS — I10 ESSENTIAL HYPERTENSION: ICD-10-CM

## 2022-04-20 DIAGNOSIS — G25.81 RESTLESS LEG SYNDROME: ICD-10-CM

## 2022-04-20 PROCEDURE — 99214 OFFICE O/P EST MOD 30 MIN: CPT | Performed by: FAMILY MEDICINE

## 2022-04-20 NOTE — PROGRESS NOTES
Chief Complaint   Patient presents with    Diabetes     follow up    Cholesterol Problem     follow up         1. \"Have you been to the ER, urgent care clinic since your last visit? Hospitalized since your last visit? \" No    2. \"Have you seen or consulted any other health care providers outside of the [address] [place] [address] since your last visit? \" No     3. For patients aged 39-70: Has the patient had a colonoscopy / FIT/ Cologuard? No      If the patient is female:    4. For patients aged 41-77: Has the patient had a mammogram within the past 2 years? NA - based on age or sex      11. For patients aged 21-65: Has the patient had a pap smear?  NA - based on age or sex         3 most recent PHQ Screens 4/20/2022   Little interest or pleasure in doing things Not at all   Feeling down, depressed, irritable, or hopeless Not at all   Total Score PHQ 2 0       Health Maintenance Due   Topic Date Due    Hepatitis C Screening  Never done    DTaP/Tdap/Td series (1 - Tdap) Never done    Colorectal Cancer Screening Combo  Never done    COVID-19 Vaccine (3 - Booster for Pfizer series) 10/13/2021    MICROALBUMIN Q1  11/30/2021    Pneumococcal 0-64 years (2 - PCV) 11/30/2021    Foot Exam Q1  05/20/2022

## 2022-04-20 NOTE — PROGRESS NOTES
HISTORY OF PRESENT ILLNESS  Madelaine Lang is a 55 y.o. male. Patient has been extremely noncompliant with follow-ups and recommendations. He was seen today for follow-up on uncontrolled diabetes, dyslipidemia, hypertension. He is concerned about jerking of both legs at nighttime as well as persistent numbness and tingling on right upper thigh. HPI    Endocrine Review  He is seen for diabetes.  Since last visit he reports: no polyuria or polydipsia, no chest pain, dyspnea or TIA's, no numbness, tingling or pain in extremities, no unusual visual symptoms, no hypoglycemia, no medication side effects noted, weight has decreased, no significant changes.  Testing: is not performed.  He reports medication compliance: taking Metformin one time only.  Medication side effects: none.  Diabetic diet compliance: compliant all of the time.  Lab review:   After last visit, his Metformin dose was increased to 1 g twice daily and glipizide was changed to glimepiride to improve compliance. He is still taking metformin only 1 time a day       He has problem with insurance approval.  We tried several medications including Adry Resendez and had a problem getting prior authorization from insurance. On last visit with Pharm. D. he was started on pioglitazone, he stopped it due to concern of itching and weight gain.     Cardiovascular Review  The patient has hypertension, hyperlipidemia and Family h/o CAD.   He reports taking medications as instructed, no medication side effects noted, patient does not perform home BP monitoring, no chest pain on exertion, no dyspnea on exertion, no orthostatic dizziness or lightheadedness, no orthopnea or paroxysmal nocturnal dyspnea, no palpitations, no intermittent claudication symptoms.  Diet and Lifestyle: generally follows a low fat low cholesterol diet, generally follows a low sodium diet, exercises regularly, nonsmoker.  Lab review: no lab studies available for review at time of visit.    Medicines: Lisinopril 10 mg and stopped atorvastatin 10 mg again. Review of Systems   Constitutional: Negative for chills, fever and malaise/fatigue. HENT: Negative for congestion, ear pain, sore throat and tinnitus. Eyes: Negative for blurred vision, double vision, pain and discharge. Respiratory: Negative for cough, shortness of breath and wheezing. Cardiovascular: Negative for chest pain, palpitations and leg swelling. Gastrointestinal: Negative for abdominal pain, blood in stool, constipation, diarrhea, nausea and vomiting. Genitourinary: Negative for dysuria, frequency, hematuria and urgency. Musculoskeletal: Negative for back pain, joint pain and myalgias. Skin: Negative for rash. Neurological: Negative for dizziness, tremors, seizures and headaches. Jerking of legs at nighttime  Numbness and tingling of skin of right upper thigh   Endo/Heme/Allergies: Negative for polydipsia. Does not bruise/bleed easily. Psychiatric/Behavioral: Negative for depression and substance abuse. The patient is not nervous/anxious. Physical Exam  Vitals and nursing note reviewed. Constitutional:       Appearance: He is well-developed. He is not diaphoretic. HENT:      Head: Normocephalic and atraumatic. Right Ear: External ear normal.      Mouth/Throat:      Pharynx: No oropharyngeal exudate. Eyes:      General: No scleral icterus. Conjunctiva/sclera: Conjunctivae normal.      Pupils: Pupils are equal, round, and reactive to light. Neck:      Thyroid: No thyromegaly. Vascular: No JVD. Cardiovascular:      Rate and Rhythm: Normal rate and regular rhythm. Pulses:           Dorsalis pedis pulses are 2+ on the right side and 2+ on the left side. Posterior tibial pulses are 2+ on the right side and 2+ on the left side. Heart sounds: Normal heart sounds. No murmur heard.       Pulmonary:      Effort: Pulmonary effort is normal.      Breath sounds: Normal breath sounds. No wheezing. Abdominal:      General: Bowel sounds are normal. There is no distension. Palpations: Abdomen is soft. There is no mass. Musculoskeletal:         General: No tenderness. Normal range of motion. Cervical back: Normal range of motion and neck supple. Feet:      Right foot:      Protective Sensation: 10 sites tested. 10 sites sensed. Skin integrity: Skin integrity normal.      Toenail Condition: Right toenails are normal.      Left foot:      Protective Sensation: 10 sites tested. 10 sites sensed. Skin integrity: Skin integrity normal.      Toenail Condition: Left toenails are normal.   Lymphadenopathy:      Cervical: No cervical adenopathy. Skin:     General: Skin is warm and dry. Findings: No rash. Neurological:      Mental Status: He is alert and oriented to person, place, and time. Cranial Nerves: No cranial nerve deficit. Deep Tendon Reflexes: Reflexes are normal and symmetric. Reflexes normal.         ASSESSMENT and PLAN  Diagnoses and all orders for this visit:    1. Controlled type 2 diabetes mellitus without complication, without long-term current use of insulin ([facility] [address]  Assessment & Plan:   uncontrolled, continue current medications pending work up below, medication adherence emphasized, lifestyle modifications recommended    Orders:  -      DIABETES FOOT EXAM  -     MICROALBUMIN, UR, RAND W/ MICROALB/CREAT RATIO; Future  -     METABOLIC PANEL, COMPREHENSIVE; Future  -     LIPID PANEL; Future  -     HEMOGLOBIN A1C WITH EAG; Future    2. Essential hypertension  -     METABOLIC PANEL, COMPREHENSIVE; Future    3. Family history of early CAD  -     LIPID PANEL; Future    4. Dyslipidemia, goal LDL below 178  -     METABOLIC PANEL, COMPREHENSIVE; Future  -     LIPID PANEL; Future    5. Need for hepatitis C screening test  -     HEPATITIS C AB; Future    6.  Screen for colon cancer  -     COLOGUARD TEST (FECAL DNA COLORECTAL CANCER SCREENING); Future    7. Restless leg syndrome  We discussed different medication management for restless leg syndrome but want to check his labs first.  Discussed lifestyle issues and health guidance given  Patient was given an after visit summary which includes diagnoses, vital signs, current medications, instructions and references & authorized prescriptions . Results of labs will be conveyed to patient, once available. Pt verbalized instructions I provided and expressed understanding of discussion that was held today. Follow-up and Dispositions    · Return in about 3 months (around 7/20/2022) for follow up, fasting. Please note that this dictation was completed with [id number], the [employer] voice recognition software. Quite often unanticipated grammatical, syntax, homophones, and other interpretive errors are inadvertently transcribed by the computer software. Please disregard these errors. Please excuse any errors that have escaped final proofreading. Thank you.

## 2022-04-20 NOTE — ASSESSMENT & PLAN NOTE
uncontrolled, continue current medications pending work up below, medication adherence emphasized, lifestyle modifications recommended

## 2022-04-20 NOTE — PATIENT INSTRUCTIONS
Restless Legs Syndrome: Care Instructions  Your Care Instructions  Restless legs syndrome is a common nervous system problem. People with this syndrome feel a creeping, achy, or unpleasant feeling in the legs and an overpowering urge to move them. It often occurs in the evening and at night and can lead to sleep problems and tiredness. Your doctor may suggest doing a study of your sleep patterns to figure out what is happening when you try to sleep. Many people get relief from symptoms when they get regular exercise, eat well, and avoid caffeine, alcohol, and tobacco.  Follow-up care is a key part of your treatment and safety. Be sure to make and go to all appointments, and call your doctor if you are having problems. It's also a good idea to know your test results and keep a list of the medicines you take. How can you care for yourself at home? · Take your medicines exactly as prescribed. Call your doctor if you think you are having a problem with your medicine. · Try bathing in hot or cold water. Applying a heating pad or ice bag to your legs may also help symptoms. · Stretch and massage your legs before bed or when discomfort begins. · Get some exercise for at least 30 minutes a day on most days of the week. Stop exercising at least 3 hours before bedtime. · Try to plan for situations where you will need to remain seated for long stretches. For example, if you are traveling by car, plan some stops so you can get out and walk around. · Tell your doctor about any medicines you are taking. This includes all over-the-counter, prescription, and herbal medicines. Some medicines, such as antidepressants, antihistamines, and cold and sinus medicines, can make your symptoms worse. · Avoid caffeine products, such as coffee, tea, cola, and chocolate. Caffeine can interrupt your sleep and stimulate you. · Do not smoke. Nicotine can make restless legs worse.  If you need help quitting, talk to your doctor about stop-smoking programs and medicines. These can increase your chances of quitting for good. · Do not drink alcohol late in the evening. Take steps to help you sleep better  · Get plenty of sunlight in the outdoors, particularly later in the afternoon. · Use the evening hours for settling down. Avoid activities that challenge you in the hours before bedtime. · Eat meals at regular times, and do not snack before bedtime. · Keep your bedroom quiet, dark, and cool. Try using a sleep mask and earplugs to help you sleep. · Limit how much you drink at night to reduce your need to get up to urinate. But do not go to bed thirsty. · Run a fan or other steady \"white noise\" during the night if noises wake you up. · Farber the bed for sleeping and sex. Do your reading or TV watching in another room. · Once you are in bed, relax from head to toe, and guide your mind to pleasant thoughts. · Do not stay in bed longer than 8 hours, and try to avoid naps. When should you call for help? Watch closely for changes in your health, and be sure to contact your doctor if:    · You are still not getting enough sleep.     · Your symptoms become more severe or happen more often. Where can you learn more? Go to http://www.gray.com/  Enter X429 in the search box to learn more about \"Restless Legs Syndrome: Care Instructions. \"  Current as of: December 13, 2021               Content Version: 13.2  © 1465-8952 [employer]. Care instructions adapted under license by [employer] (which disclaims liability or warranty for this information). If you have questions about a medical condition or this instruction, always ask your healthcare professional. [place] [address] any warranty or liability for your use of this information.

## 2022-04-21 LAB
ALBUMIN SERPL-MCNC: 3.9 G/DL (ref 3.5–5)
ALBUMIN/GLOB SERPL: 1.1 {RATIO} (ref 1.1–2.2)
ALP SERPL-CCNC: 72 U/L (ref 45–117)
ALT SERPL-CCNC: 32 U/L (ref 12–78)
ANION GAP SERPL CALC-SCNC: 3 MMOL/L (ref 5–15)
AST SERPL-CCNC: 14 U/L (ref 15–37)
BILIRUB SERPL-MCNC: 0.6 MG/DL (ref 0.2–1)
BUN SERPL-MCNC: 13 MG/DL (ref 6–20)
BUN/CREAT SERPL: 15 (ref 12–20)
CALCIUM SERPL-MCNC: 8.5 MG/DL (ref 8.5–10.1)
CHLORIDE SERPL-SCNC: 104 MMOL/L (ref 97–108)
CHOLEST SERPL-MCNC: 171 MG/DL
CO2 SERPL-SCNC: 30 MMOL/L (ref 21–32)
CREAT SERPL-MCNC: 0.87 MG/DL (ref 0.7–1.3)
CREAT UR-MCNC: 199 MG/DL
EST. AVERAGE GLUCOSE BLD GHB EST-MCNC: 223 MG/DL
GLOBULIN SER CALC-MCNC: 3.5 G/DL (ref 2–4)
GLUCOSE SERPL-MCNC: 215 MG/DL (ref 65–100)
HBA1C MFR BLD: 9.4 % (ref 4–5.6)
HCV AB SERPL QL IA: NONREACTIVE
HDLC SERPL-MCNC: 45 MG/DL
HDLC SERPL: 3.8 {RATIO} (ref 0–5)
LDLC SERPL CALC-MCNC: 103.2 MG/DL (ref 0–100)
MICROALBUMIN UR-MCNC: 5.06 MG/DL
MICROALBUMIN/CREAT UR-RTO: 25 MG/G (ref 0–30)
POTASSIUM SERPL-SCNC: 4.1 MMOL/L (ref 3.5–5.1)
PROT SERPL-MCNC: 7.4 G/DL (ref 6.4–8.2)
SODIUM SERPL-SCNC: 137 MMOL/L (ref 136–145)
TRIGL SERPL-MCNC: 114 MG/DL (ref ?–150)
VLDLC SERPL CALC-MCNC: 22.8 MG/DL

## 2022-04-21 RX ORDER — METFORMIN HYDROCHLORIDE 1000 MG/1
TABLET ORAL
Qty: 60 TABLET | Refills: 3 | Status: SHIPPED | OUTPATIENT
Start: 2022-04-21 | End: 2022-05-27 | Stop reason: ALTCHOICE

## 2022-04-21 NOTE — PROGRESS NOTES
Ash Stoddard,  I have reviewed your results. Again very disappointing. Please make sure you take Metformin 1gm two times with meals ( it will be total 2 g per day. Please continue with current Glimepiride,twice daily. Results for kidney,liver and cholesterol profile are normal.  Screening for hepatitis C is normal too. As we discussed, first let us try with correct dose of Metformin, 1 gm twice daily If these abnormal clinical findings persist, appropriate workup will be completed. The patient understands that follow up is required to elucidate the situation. A1C stays high on next visit, will add another medicine and will give you discount coupons. Again emphasize on compliance with medicine and follow ups. Let me know if you have any questions.   thanks

## 2022-04-25 NOTE — PROGRESS NOTES
Called patient. Two patient identifiers verified. Discussed lab results per provider's note. he Verbalized understanding. Letter placed in the mail.

## 2022-05-06 ENCOUNTER — TELEPHONE (OUTPATIENT)
Dept: FAMILY MEDICINE CLINIC | Age: 47
End: 2022-05-06

## 2022-05-06 NOTE — TELEPHONE ENCOUNTER
Pharmacy Progress Note - Telephone Encounter    S/O: Mr. Kirby Patrick 55 y.o. male, referred by Dr. Martha Weinberg MD, was contacted via an outbound telephone call to discuss scheduling DM f/up today. Verified patients identifiers (name & ) per HIPAA policy. - Pt no showed to last visit with PharmD. Amenable to scheduling again. A/P:  - Scheduled 22 at 10:30Am  - Patient endorses understanding to the provided information. All questions answered at this time. There are no discontinued medications. No orders of the defined types were placed in this encounter. Tawana Montez, PharmD, [facility]  Clinical Pharmacist Specialist      For Pharmacy Admin Tracking Only     CPA in place:  Yes   Recommendation Provided To: Patient/Caregiver: 1 via Telephone   Intervention Detail: Scheduled Appointment    Intervention Accepted By: Patient/Caregiver: 1   Time Spent (min): 5

## 2022-05-27 ENCOUNTER — OFFICE VISIT (OUTPATIENT)
Dept: FAMILY MEDICINE CLINIC | Age: 47
End: 2022-05-27

## 2022-05-27 VITALS
WEIGHT: 199.2 LBS | HEART RATE: 74 BPM | SYSTOLIC BLOOD PRESSURE: 118 MMHG | DIASTOLIC BLOOD PRESSURE: 78 MMHG | BODY MASS INDEX: 27.78 KG/M2

## 2022-05-27 DIAGNOSIS — E11.9 CONTROLLED TYPE 2 DIABETES MELLITUS WITHOUT COMPLICATION, WITHOUT LONG-TERM CURRENT USE OF INSULIN (HCC): Primary | ICD-10-CM

## 2022-05-27 DIAGNOSIS — Z82.49 FAMILY HISTORY OF EARLY CAD: ICD-10-CM

## 2022-05-27 DIAGNOSIS — E78.5 DYSLIPIDEMIA, GOAL LDL BELOW 100: ICD-10-CM

## 2022-05-27 RX ORDER — GLIMEPIRIDE 2 MG/1
TABLET ORAL
Qty: 90 TABLET | Refills: 1 | Status: SHIPPED | OUTPATIENT
Start: 2022-05-27 | End: 2022-05-29

## 2022-05-27 RX ORDER — ATORVASTATIN CALCIUM 10 MG/1
TABLET, FILM COATED ORAL
Qty: 30 TABLET | Refills: 3 | Status: SHIPPED | OUTPATIENT
Start: 2022-05-27

## 2022-05-27 RX ORDER — EMPAGLIFLOZIN, METFORMIN HYDROCHLORIDE 5; 1000 MG/1; MG/1
1 TABLET, EXTENDED RELEASE ORAL 2 TIMES DAILY
Qty: 60 EACH | Refills: 1 | Status: SHIPPED
Start: 2022-05-27 | End: 2022-07-13

## 2022-05-27 RX ORDER — FLASH GLUCOSE SENSOR
KIT MISCELLANEOUS
Qty: 2 KIT | Refills: 11 | Status: SHIPPED | OUTPATIENT
Start: 2022-05-27 | End: 2022-07-13 | Stop reason: ALTCHOICE

## 2022-05-27 NOTE — PATIENT INSTRUCTIONS
Your A1c was 9.4% which was uncontrolled. Today you received a free trial of Freestyle Cassandra 2 sensors, a continuous glucose monitor. It was paired with your phone. Scan the sensor 4x per day to check your blood sugar. I provided you a coupon for another free Freestyle Cassandra 2 sensor you can get at the pharmacy. STOP Metformin  START Synjardy XR 5-1000 mg twice daily with meals  INCREASE Glimepiride 2 mg to 2 tablets with breakfast and 1 tablet with dinner    Use the coupon to see how expensive Synjardy XR will be. Call me if this is too expensive.     Pharmacist Contact Information:  Lidia Lozano, PharmD, [facility] [place]  Work Cell: [phone]    Blood Sugar Goal  Fastin-130 mg/dL  1-2 after meals: Less than 180 mg/dL    Carbohydrate Goals  Meal: 30-45 grams   Snacks: 15 grams

## 2022-05-27 NOTE — PROGRESS NOTES
Pharmacy Progress Note - Diabetes Management    S/O: Mr. Vale Carlisle is a 55 y.o. male, referred by Dr. Viet Daugherty MD, with a PMH of T2DM, was seen today for diabetes management.  Patient's last A1c was 9.4% (April 2022) which was increased from 8.7% (Aug 2021) which is decreased from 8.9% (May 2021) which was elevated from A1c 8.5% (Nov 2020).        Visit was completed using real time audio visual technology, [email or web address]. Interim update: Pt was last seen by PCP on 4/20/22. A1c was noted to be uncontrolled. Pt had not been adherent to Metformin. He was encouraged to take 1 tab BID. Pt arrives to clinic today to f/up on DM management. It is found that pt is taking Metformin appropriately but he is not taking Glimepiride as prescribed- 2 tabs daily instead of 2 tabs QAM and 1 tab QPM.  He is not checking his BG rgds. Pt is interested in CGM. Cost was discussed.  $75/mo for cost may be expensive for pt, but he is interested in trying free trial.    Pt was provided a sample CGM Freestyle ZBD Displays 2 sensor. Sensor was applied. Discussed how device works, how to place, how to scan, reports, settings, and alarms. FlyReadyJet 2 keny was downloaded on [employer]. Sensor was paired with phone. Pt's keny was connected to this writer's [employer] account.       Medication Adherence/Access:  - Current insurance has $1800 Rx deductible that must be met    Diabetes Management:  - Previous anti-hyperglycemic agents includes: Januvia (cost), Jardiance (cost), Invokana (cost), Pioglitazone (itching, wt gain)    Current anti-hyperglycemic regimen includes:    - Metformin 1000 mg BID  - Glimepiride 2 mg 2 tabs QAM and 1 tab QPM - pt only taking 2 tabs daily    ROS:  Today, Pt endorses:  - Symptoms of Hyperglycemia: none  - Symptoms of Hypoglycemia: none    Self Monitoring Blood Glucose (SMBG) or CGM:  - Brought in home glucometer/blood glucose log/CGM reader today:  no  - Checks BG: never  - Expressed interested in CGM    Nutrition:  - Eats 6-8 oranges per day in addition to meals      Vitals: Wt Readings from Last 3 Encounters:   04/20/22 199 lb 3.2 oz (90.4 kg)   10/08/21 197 lb 12.8 oz (89.7 kg)   08/27/21 199 lb 12.8 oz (90.6 kg)     BP Readings from Last 3 Encounters:   04/20/22 118/77   10/08/21 126/88   08/27/21 124/85     Pulse Readings from Last 3 Encounters:   04/20/22 74   10/08/21 79   08/27/21 75       Past Medical History:   Diagnosis Date    Diabetes ([facility] [address]     Hypertension      No Known Allergies    Current Outpatient Medications   Medication Sig    atorvastatin (LIPITOR) 10 mg tablet TAKE ONE TABLET BY MOUTH EVERY NIGHT AT BEDTIME    lisinopriL (PRINIVIL, ZESTRIL) 10 mg tablet TAKE ONE TABLET BY MOUTH DAILY *NEED TO MAKE MD APPOINTMENT    metFORMIN (GLUCOPHAGE) 1,000 mg tablet TAKE ONE TABLET BY MOUTH TWICE A DAY WITH MEALS    glimepiride (AMARYL) 2 mg tablet TAKE TWO TABLETS BY MOUTH EVERY MORNING AND TAKE ONE TABLET BY MOUTH EVERY EVENING BEFORE MEALS (Patient taking differently: Take 2 mg by mouth two (2) times a day.)     No current facility-administered medications for this visit. Lab Results   Component Value Date/Time    Sodium 137 04/20/2022 11:26 AM    Potassium 4.1 04/20/2022 11:26 AM    Chloride 104 04/20/2022 11:26 AM    CO2 30 04/20/2022 11:26 AM    Anion gap 3 (L) 04/20/2022 11:26 AM    Glucose 215 (H) 04/20/2022 11:26 AM    BUN 13 04/20/2022 11:26 AM    Creatinine 0.87 04/20/2022 11:26 AM    BUN/Creatinine ratio 15 04/20/2022 11:26 AM    GFR est AA >60 04/20/2022 11:26 AM    GFR est non-AA >60 04/20/2022 11:26 AM    Calcium 8.5 04/20/2022 11:26 AM    Bilirubin, total 0.6 04/20/2022 11:26 AM    Alk.  phosphatase 72 04/20/2022 11:26 AM    Protein, total 7.4 04/20/2022 11:26 AM    Albumin 3.9 04/20/2022 11:26 AM    Globulin 3.5 04/20/2022 11:26 AM    A-G Ratio 1.1 04/20/2022 11:26 AM    ALT (SGPT) 32 04/20/2022 11:26 AM     Lab Results   Component Value Date/Time    Cholesterol, total 171 04/20/2022 11:26 AM    HDL Cholesterol 45 04/20/2022 11:26 AM    LDL, calculated 103.2 (H) 04/20/2022 11:26 AM    VLDL, calculated 22.8 04/20/2022 11:26 AM    Triglyceride 114 04/20/2022 11:26 AM    CHOL/HDL Ratio 3.8 04/20/2022 11:26 AM     Lab Results   Component Value Date/Time    WBC 5.0 11/30/2020 10:37 AM    HGB 15.1 11/30/2020 10:37 AM    HCT 46.4 11/30/2020 10:37 AM    PLATELET 988 82/88/0017 10:37 AM    MCV 88.7 11/30/2020 10:37 AM       Lab Results   Component Value Date/Time    Microalbumin/Creat ratio (mg/g creat) 25 04/20/2022 11:26 AM    Microalbumin,urine random 5.06 04/20/2022 11:26 AM       Lab Results   Component Value Date/Time    Hemoglobin A1c 9.4 (H) 04/20/2022 11:26 AM    Hemoglobin A1c 8.7 (H) 08/12/2021 08:51 AM    Hemoglobin A1c 8.9 (H) 05/20/2021 11:19 AM     No results found for: [id number]     CrCl cannot be calculated (Unknown ideal weight. ). A/P:    1) T2DM: Per ADA guidelines, Pt's A1c is not at goal of < 7%. Current SMBG(s)/CGM trend is unknown as pt does not check at home. Pt has not been taking the appropriate dose of Glimepiride. Pt wants to evaluate cost of adding Jardiance again. This was previously prescribed, but cost was very expensive. SGLT2 inhibitor would be ideal choice as second line agent. Will provide  - STOP Metformin  - START Synjardy XR 5-1000 mg BID  - Nonadherence - Encouraged pt to take Glimepiride 2 mg 2 tabs QAM and 1 tab QPM  - Provided CGM sensor sample  - Provided FSL2 coupon  - Provided Synjardy coupon    2) HTN: BP is at goal of < 130/80. Currently taking ACEi. No evidence of microalbuminuria (2022). - Continue Lisinopril 10 mg daily    3) Primary Prevention ASCVD: Per ADA guidelines, pts age 43-69 yrs are recommended for statin therapy. Currently taking moderate intensity statin. LDL slightly above 100 mg/dL. - Continue Atorvastatin 10 mg daily      Medication reconciliation was completed during the visit.     Medications Discontinued During This Encounter   Medication Reason    metFORMIN (GLUCOPHAGE) 1,000 mg tablet Therapy Completed    glimepiride (AMARYL) 2 mg tablet REORDER     Orders Placed This Encounter    glimepiride (AMARYL) 2 mg tablet     Sig: TAKE TWO TABLETS BY MOUTH EVERY MORNING AND TAKE ONE TABLET BY MOUTH EVERY EVENING BEFORE MEALS     Dispense:  90 Tablet     Refill:  1    empagliflozin-metformin (Synjardy XR) 5-1,000 mg TBph     Sig: Take 1 Tablet by mouth two (2) times a day. Dispense:  60 Each     Refill:  1    flash glucose sensor (FreeStyle Cassandra 2 Sensor) kit     Sig: Scan sensor 4x per day. Replace sensor every 14 days. Dispense:  2 Kit     Refill:  11       Patient verbalized understanding of the information presented and all of the patients questions were answered. AVS was handed to the patient. Patient advised to call the office with any additional questions or concerns. Notifications of recommendations will be sent to Dr. Mary Ann Mehta MD for review. Patient will return to clinic in 4 week(s) for follow up. Edward ScottD, BCGP  Clinical Pharmacist Specialist          For Pharmacy Admin Tracking Only     CPA in place:  Yes   Recommendation Provided To: Patient/Caregiver: 9 via In person   Intervention Detail: Adherence Monitorin, Device Training, Discontinued Rx: 1, reason: Duplicate Therapy, Dose Adjustment: 1, reason: Therapy Optimization, New Rx: 1, reason: Needs Additional Therapy, Patient Access Assistance/Sample Provided and Scheduled Appointment   Intervention Accepted By: Patient/Caregiver: 9   Time Spent (min): 60

## 2022-05-29 DIAGNOSIS — E11.9 CONTROLLED TYPE 2 DIABETES MELLITUS WITHOUT COMPLICATION, WITHOUT LONG-TERM CURRENT USE OF INSULIN (HCC): ICD-10-CM

## 2022-05-29 RX ORDER — GLIMEPIRIDE 2 MG/1
TABLET ORAL
Qty: 90 TABLET | Refills: 1 | Status: SHIPPED | OUTPATIENT
Start: 2022-05-29 | End: 2022-10-13 | Stop reason: SDUPTHER

## 2022-07-06 ENCOUNTER — TELEPHONE (OUTPATIENT)
Dept: INTERNAL MEDICINE CLINIC | Age: 47
End: 2022-07-06

## 2022-07-06 NOTE — TELEPHONE ENCOUNTER
Pharmacy Progress Note - Telephone Encounter    S/O: Mr. Ayla Pritchett 52 y.o. male, referred by Dr. Duglas Lyn MD, was contacted via an outbound telephone call to discuss cost of med/rescheduling PharmD visit today. Verified patients identifiers (name & ) per HIPAA policy. - Pt LVM for this writer stating the cost of previous med was too high.    - He would be eligible to receive Mounjaro and use  coupon to decrease cost to $25/mo. A/P:  - Rescheduled pt for 22 at [facility] [address] - at [facility] - provided address over the phone  - Patient endorses understanding to the provided information. All questions answered at this time. There are no discontinued medications. No orders of the defined types were placed in this encounter. Edward PalacioD, [facility]  Clinical Pharmacist Specialist      For Pharmacy Admin Tracking Only     CPA in place:  Yes   Recommendation Provided To: Patient/Caregiver: 2 via Telephone   Intervention Detail: Patient Access Assistance/Sample Provided and Scheduled Appointment   Intervention Accepted By: Patient/Caregiver: 2   Time Spent (min): 10

## 2022-07-13 ENCOUNTER — OFFICE VISIT (OUTPATIENT)
Dept: INTERNAL MEDICINE CLINIC | Age: 47
End: 2022-07-13
Payer: COMMERCIAL

## 2022-07-13 VITALS
DIASTOLIC BLOOD PRESSURE: 85 MMHG | WEIGHT: 203 LBS | HEART RATE: 73 BPM | SYSTOLIC BLOOD PRESSURE: 124 MMHG | BODY MASS INDEX: 28.31 KG/M2

## 2022-07-13 DIAGNOSIS — E11.9 CONTROLLED TYPE 2 DIABETES MELLITUS WITHOUT COMPLICATION, WITHOUT LONG-TERM CURRENT USE OF INSULIN (HCC): Primary | ICD-10-CM

## 2022-07-13 LAB — GLUCOSE POC: 166 MG/DL

## 2022-07-13 PROCEDURE — 82962 GLUCOSE BLOOD TEST: CPT | Performed by: PHARMACIST

## 2022-07-13 RX ORDER — METFORMIN HYDROCHLORIDE 1000 MG/1
1000 TABLET ORAL 2 TIMES DAILY WITH MEALS
COMMUNITY
End: 2022-10-13 | Stop reason: SDUPTHER

## 2022-07-13 RX ORDER — TIRZEPATIDE 2.5 MG/.5ML
2.5 INJECTION, SOLUTION SUBCUTANEOUS
Qty: 2 ML | Refills: 1 | Status: SHIPPED | OUTPATIENT
Start: 2022-07-13 | End: 2022-09-26

## 2022-07-13 NOTE — PATIENT INSTRUCTIONS
Your A1c was 9.4% which was uncontrolled. START Mounjaro 2.5 mg injected under the skin weekly. You can inject into the back of the upper arm, the abdomen (avoid the belly button), or the upper thigh. Rotate site each week. INCREASE Glimepiride 2 mg to 2 tablets in the morning and 1 tablet in the evening.     Check your blood sugar 4x per week - fasting and 1-hour after a meal.    Blood Sugar Goal  Fastin-130 mg/dL  1-2 after meals: Less than 180 mg/dL    Carbohydrate Goals  Meal: 30-45 grams   Snacks: 15 grams

## 2022-07-13 NOTE — PROGRESS NOTES
Pharmacy Progress Note - Diabetes Management    S/O: Mr. Ghazal Perez is a 52 y.o. male, referred by Dr. Rachel Pallas, MD, with a PMH of T2DM, was seen today for diabetes management.  Patient's last A1c was 9.4% (April 2022) which was increased from 8.7% (Aug 2021) which is decreased from 8.9% (May 2021) which was elevated from A1c 8.5% (Nov 2020).       Interim update: Pt was last seen by this writer on 5/27/22. Pt was provided FSL2 CGM sample sensor and coupon for additional free sensor in order to help provide additional insight on how foods are impacting BG rdgs. Pt was also switched to Sharon and provided a coupon. Pt contacted this writer and stated that he was unable to get Synjardy d/t cost (>$600). Pt's deductible is very high - $1800. He restarted Metformin. Pt arrives to clinic today to f/up on DM management. He has been taking Metformin, but he is not been taking appropriate dose of Glimepiride - taking 2 tabs QAM instead of 2 tabs QAM AND 1 tab QPM.     This writer had investigated potential cost effective options for the pt in order to achieve BG lowering. He has not tolerated Pioglitazone. Other oral brand meds have been too expensive. Pt wants to avoid insulin as much as possible. Mounjaro, GLP-1/GIP therapy, has a  coupon card that may bring cost to an affordable level. Pt was instructed on mechanism, efficacy, side effects, storage, what to do if dose is missed, administration, and disposal of medication. Pt utilized demo to successfully show how to administer medication.         Medication Adherence/Access:  - Current insurance has $1800 Rx deductible that must be met     Diabetes Management:  - Previous anti-hyperglycemic agents includes: Januvia (cost), Jardiance (cost), Invokana (cost), Pioglitazone (itching, wt gain)    Current anti-hyperglycemic regimen includes:    - Metformin 1000 mg BID  - Glimepiride 2 mg 2 tabs QAM only, not taking PM dose    ROS:  Today, Pt endorses:  - Symptoms of Hyperglycemia: none  - Symptoms of Hypoglycemia: none    Self Monitoring Blood Glucose (SMBG) or CGM:  - Brought in home glucometer/blood glucose log/CGM reader today:  no  - Hasn't checked BG in weeks. Did not like CGM d/t beeps and concern for hacking with keny. Stopped sharing data with practice. - Fasting BG today: 166 mg/dL - last meal was late afternoon yesterday    Nutrition:  - Yesterday afternoon meal - noodles with a sauce      Vitals: Wt Readings from Last 3 Encounters:   05/27/22 199 lb 3.2 oz (90.4 kg)   04/20/22 199 lb 3.2 oz (90.4 kg)   10/08/21 197 lb 12.8 oz (89.7 kg)     BP Readings from Last 3 Encounters:   05/27/22 118/78   04/20/22 118/77   10/08/21 126/88     Pulse Readings from Last 3 Encounters:   05/27/22 74   04/20/22 74   10/08/21 79       Past Medical History:   Diagnosis Date    Diabetes ([facility] [address]     Hypertension      No Known Allergies    Current Outpatient Medications   Medication Sig    glimepiride (AMARYL) 2 mg tablet TAKE TWO TABLETS BY MOUTH EVERY MORNING AND ONE TABLET BY MOUTH IN THE EVENING BEFORE MEALS    atorvastatin (LIPITOR) 10 mg tablet TAKE ONE TABLET BY MOUTH EVERY NIGHT AT BEDTIME    empagliflozin-metformin (Synjardy XR) 5-1,000 mg TBph Take 1 Tablet by mouth two (2) times a day.  flash glucose sensor (FreeStyle Cassandra 2 Sensor) kit Scan sensor 4x per day. Replace sensor every 14 days.  lisinopriL (PRINIVIL, ZESTRIL) 10 mg tablet TAKE ONE TABLET BY MOUTH DAILY *NEED TO MAKE MD APPOINTMENT     No current facility-administered medications for this visit.      Lab Results   Component Value Date/Time    Sodium 137 04/20/2022 11:26 AM    Potassium 4.1 04/20/2022 11:26 AM    Chloride 104 04/20/2022 11:26 AM    CO2 30 04/20/2022 11:26 AM    Anion gap 3 (L) 04/20/2022 11:26 AM    Glucose 215 (H) 04/20/2022 11:26 AM    BUN 13 04/20/2022 11:26 AM    Creatinine 0.87 04/20/2022 11:26 AM    BUN/Creatinine ratio 15 04/20/2022 11:26 AM    GFR est AA >60 04/20/2022 11:26 AM    GFR est non-AA >60 04/20/2022 11:26 AM    Calcium 8.5 04/20/2022 11:26 AM    Bilirubin, total 0.6 04/20/2022 11:26 AM    Alk. phosphatase 72 04/20/2022 11:26 AM    Protein, total 7.4 04/20/2022 11:26 AM    Albumin 3.9 04/20/2022 11:26 AM    Globulin 3.5 04/20/2022 11:26 AM    A-G Ratio 1.1 04/20/2022 11:26 AM    ALT (SGPT) 32 04/20/2022 11:26 AM     Lab Results   Component Value Date/Time    Cholesterol, total 171 04/20/2022 11:26 AM    HDL Cholesterol 45 04/20/2022 11:26 AM    LDL, calculated 103.2 (H) 04/20/2022 11:26 AM    VLDL, calculated 22.8 04/20/2022 11:26 AM    Triglyceride 114 04/20/2022 11:26 AM    CHOL/HDL Ratio 3.8 04/20/2022 11:26 AM     Lab Results   Component Value Date/Time    WBC 5.0 11/30/2020 10:37 AM    HGB 15.1 11/30/2020 10:37 AM    HCT 46.4 11/30/2020 10:37 AM    PLATELET 971 32/82/2918 10:37 AM    MCV 88.7 11/30/2020 10:37 AM       Lab Results   Component Value Date/Time    Microalbumin/Creat ratio (mg/g creat) 25 04/20/2022 11:26 AM    Microalbumin,urine random 5.06 04/20/2022 11:26 AM       Lab Results   Component Value Date/Time    Hemoglobin A1c 9.4 (H) 04/20/2022 11:26 AM    Hemoglobin A1c 8.7 (H) 08/12/2021 08:51 AM    Hemoglobin A1c 8.9 (H) 05/20/2021 11:19 AM     No results found for: [id number]     CrCl cannot be calculated (Unknown ideal weight. ). A/P:    1) T2DM: Per ADA guidelines, Pt's A1c is not at goal of < 7%. Current SMBG(s)/CGM trend is unknown as pt has not checked BG rdgs consistently. Most recently reported BG rdgs indicate poor control. He is not adherent to sulfonylurea dose. Will start Mounjaro.  - START Mounjaro 2.5 mg weekly  - NONADHERENCE: Increase Glimepiride 2 mg to 2 tabs QAM and 1 tab QPM  - Continue Metformin 1000 mg BID  - Coupon provided for free month of Mounjaro  - Coupon provided for reduced cost of [employer]  - Device training completed for [employer]    2) HTN: BP is at goal of < 130/80.   Currently taking ACEi. No evidence of microalbuminuria (). - Continue Lisinopril 10 mg daily     3) Primary Prevention ASCVD: Per ADA guidelines, pts age 43-69 yrs are recommended for statin therapy. Currently taking moderate intensity statin. LDL slightly above 100 mg/dL. - Continue Atorvastatin 10 mg daily      Medication reconciliation was completed during the visit. Medications Discontinued During This Encounter   Medication Reason    empagliflozin-metformin (Synjardy XR) 5-1,000 mg TBph Cost of Medication    flash glucose sensor (FreeStyle Cassandra 2 Sensor) kit Therapy Completed     Orders Placed This Encounter    AMB POC GLUCOSE BLOOD, BY GLUCOSE MONITORING DEVICE    metFORMIN (GLUCOPHAGE) 1,000 mg tablet     Sig: Take 1,000 mg by mouth two (2) times daily (with meals).  tirzepatide (Mounjaro) 2.5 mg/0.5 mL pnij     Si.5 mg by SubCUTAneous route every seven (7) days. Dispense:  2 mL     Refill:  1       Patient verbalized understanding of the information presented and all of the patients questions were answered. AVS was handed to the patient. Patient advised to call the office with any additional questions or concerns. Notifications of recommendations will be sent to Dr. Pascual Landa MD for review. Patient will return to clinic in 4 week(s) for follow up. Leann Choi, PharmD, [facility]  Clinical Pharmacist Specialist          For Pharmacy Admin Tracking Only     CPA in place:  Yes   Recommendation Provided To: Patient/Caregiver: 10 via In person   Intervention Detail: Adherence Monitorin, Device Training, Discontinued Rx: 2, reason: Cost/Formulary Change and Patient Preference, Lab(s) Ordered, New Rx: 2, reason: Cost/Formulary Change, Needs Additional Therapy and Patient Preference, Patient Access Assistance/Sample Provided and Scheduled Appointment   Intervention Accepted By: Patient/Caregiver: 10   Time Spent (min): 60

## 2022-07-21 ENCOUNTER — OFFICE VISIT (OUTPATIENT)
Dept: FAMILY MEDICINE CLINIC | Age: 47
End: 2022-07-21
Payer: COMMERCIAL

## 2022-07-21 VITALS
BODY MASS INDEX: 28.06 KG/M2 | WEIGHT: 200.4 LBS | HEIGHT: 71 IN | HEART RATE: 73 BPM | RESPIRATION RATE: 16 BRPM | OXYGEN SATURATION: 97 % | SYSTOLIC BLOOD PRESSURE: 114 MMHG | TEMPERATURE: 97.7 F | DIASTOLIC BLOOD PRESSURE: 75 MMHG

## 2022-07-21 DIAGNOSIS — E11.9 CONTROLLED TYPE 2 DIABETES MELLITUS WITHOUT COMPLICATION, WITHOUT LONG-TERM CURRENT USE OF INSULIN (HCC): Primary | ICD-10-CM

## 2022-07-21 DIAGNOSIS — E78.5 DYSLIPIDEMIA, GOAL LDL BELOW 100: ICD-10-CM

## 2022-07-21 DIAGNOSIS — Z82.49 FAMILY HISTORY OF EARLY CAD: ICD-10-CM

## 2022-07-21 DIAGNOSIS — I10 ESSENTIAL HYPERTENSION: ICD-10-CM

## 2022-07-21 PROBLEM — Z76.89 ENCOUNTER TO ESTABLISH CARE WITH NEW DOCTOR: Status: RESOLVED | Noted: 2019-11-25 | Resolved: 2022-07-21

## 2022-07-21 PROCEDURE — 3046F HEMOGLOBIN A1C LEVEL >9.0%: CPT | Performed by: FAMILY MEDICINE

## 2022-07-21 PROCEDURE — 99213 OFFICE O/P EST LOW 20 MIN: CPT | Performed by: FAMILY MEDICINE

## 2022-07-21 NOTE — PROGRESS NOTES
Chief Complaint   Patient presents with    Diabetes     Follow up         1. \"Have you been to the ER, urgent care clinic since your last visit? Hospitalized since your last visit? \" No    2. \"Have you seen or consulted any other health care providers outside of the [address] [place] [address] since your last visit? \" No     3. For patients aged 39-70: Has the patient had a colonoscopy / FIT/ Cologuard? No      If the patient is female:    4. For patients aged 41-77: Has the patient had a mammogram within the past 2 years? NA - based on age or sex      11. For patients aged 21-65: Has the patient had a pap smear?  NA - based on age or sex         3 most recent PHQ Screens 7/21/2022   Little interest or pleasure in doing things Not at all   Feeling down, depressed, irritable, or hopeless Not at all   Total Score PHQ 2 0       Health Maintenance Due   Topic Date Due    DTaP/Tdap/Td series (1 - Tdap) Never done    COVID-19 Vaccine (3 - Booster for Pfizer series) 10/13/2021    Pneumococcal 0-64 years (2 - PCV) 11/30/2021    A1C test (Diabetic or Prediabetic)  07/20/2022

## 2022-07-21 NOTE — PROGRESS NOTES
HISTORY OF PRESENT ILLNESS  Delfino Padilla is a 52 y.o. male. Patient was seen today for 3 months follow-up appointment on uncontrolled diabetes and dyslipidemia. HPI    Endocrine Review  He is seen for diabetes. Since last visit he reports: no polyuria or polydipsia, no chest pain, dyspnea or TIA's, no numbness, tingling or pain in extremities, no unusual visual symptoms, no hypoglycemia, no medication side effects noted, weight has decreased, no significant changes. Testing: is not performed. He reports medication compliance: taking Metformin one time only. Medication side effects: none. Diabetic diet compliance: compliant all of the time. Lab review:   After last visit, his Metformin dose was increased to 1 g twice daily and glipizide was changed to glimepiride to improve compliance. He is now on glimepiride 2 mg 2 times daily before meals as well as metformin 1 g twice daily after meals. He has insurance issues for coverage of diabetes medications and recently he is started on Mounjaro injections. He has kept his prescription but not started yet. Cardiovascular Review  The patient has hypertension, hyperlipidemia and Family h/o CAD. He reports taking medications as instructed, no medication side effects noted, patient does not perform home BP monitoring, no chest pain on exertion, no dyspnea on exertion, no orthostatic dizziness or lightheadedness, no orthopnea or paroxysmal nocturnal dyspnea, no palpitations, no intermittent claudication symptoms. Diet and Lifestyle: generally follows a low fat low cholesterol diet, generally follows a low sodium diet, exercises regularly, nonsmoker. Lab review: no lab studies available for review at time of visit. Medicines: Lisinopril 10 mg and atorvastatin 10 mg   Review of Systems   Constitutional:  Negative for chills, fever and malaise/fatigue. HENT:  Negative for congestion, ear pain, sore throat and tinnitus.     Eyes:  Negative for blurred vision, double vision, pain and discharge. Respiratory:  Negative for cough, shortness of breath and wheezing. Cardiovascular:  Negative for chest pain, palpitations and leg swelling. Gastrointestinal:  Negative for abdominal pain, blood in stool, constipation, diarrhea, nausea and vomiting. Genitourinary:  Negative for dysuria, frequency, hematuria and urgency. Musculoskeletal:  Negative for back pain, joint pain and myalgias. Skin:  Negative for rash. Neurological:  Negative for dizziness, tremors, seizures and headaches. Endo/Heme/Allergies:  Negative for polydipsia. Does not bruise/bleed easily. Psychiatric/Behavioral:  Negative for depression and substance abuse. The patient is not nervous/anxious. Physical Exam  Vitals and nursing note reviewed. Constitutional:       Appearance: He is well-developed. He is not diaphoretic. HENT:      Head: Normocephalic and atraumatic. Right Ear: External ear normal.      Mouth/Throat:      Mouth: Mucous membranes are moist.      Pharynx: No oropharyngeal exudate. Eyes:      General: No scleral icterus. Conjunctiva/sclera: Conjunctivae normal.      Pupils: Pupils are equal, round, and reactive to light. Neck:      Thyroid: No thyromegaly. Vascular: No JVD. Cardiovascular:      Rate and Rhythm: Normal rate and regular rhythm. Heart sounds: Normal heart sounds. No murmur heard. Pulmonary:      Effort: Pulmonary effort is normal.      Breath sounds: Normal breath sounds. No wheezing. Abdominal:      General: Bowel sounds are normal. There is no distension. Palpations: Abdomen is soft. There is no mass. Musculoskeletal:         General: No tenderness. Normal range of motion. Cervical back: Normal range of motion and neck supple. Lymphadenopathy:      Cervical: No cervical adenopathy. Skin:     General: Skin is warm and dry. Findings: No rash.    Neurological:      Mental Status: He is alert and oriented to person, place, and time. Cranial Nerves: No cranial nerve deficit. Deep Tendon Reflexes: Reflexes are normal and symmetric. Reflexes normal.   Psychiatric:         Mood and Affect: Mood normal.         Behavior: Behavior normal.       ASSESSMENT and PLAN  Diagnoses and all orders for this visit:    1. Controlled type 2 diabetes mellitus without complication, without long-term current use of insulin ([facility] [address]  Assessment & Plan:   borderline controlled, continue current medications pending work up below, medication adherence emphasized, lifestyle modifications recommended    Orders:  -     HEMOGLOBIN A1C WITH EAG; Future  -     METABOLIC PANEL, COMPREHENSIVE; Future    2. Dyslipidemia, goal LDL below 659  -     METABOLIC PANEL, COMPREHENSIVE; Future  -     LIPID PANEL; Future    3. Essential hypertension  -     METABOLIC PANEL, COMPREHENSIVE; Future    4. Family history of early CAD  -     LIPID PANEL; Future  Had a long discussion on compliance with medications and follow-ups. Discussed lifestyle issues and health guidance given  Patient was given an after visit summary which includes diagnoses, vital signs, current medications, instructions and references & authorized prescriptions . Results of labs will be conveyed to patient, once available. Pt verbalized instructions I provided and expressed understanding of discussion that was held today. Follow-up and Dispositions    Return in about 3 months (around 10/21/2022) for follow up, fasting. Please note that this dictation was completed with [employer], the Bridge voice recognition software. Quite often unanticipated grammatical, syntax, homophones, and other interpretive errors are inadvertently transcribed by the computer software. Please disregard these errors. Please excuse any errors that have escaped final proofreading. Thank you.

## 2022-07-22 LAB
ALBUMIN SERPL-MCNC: 3.8 G/DL (ref 3.5–5)
ALBUMIN/GLOB SERPL: 1.1 {RATIO} (ref 1.1–2.2)
ALP SERPL-CCNC: 65 U/L (ref 45–117)
ALT SERPL-CCNC: 36 U/L (ref 12–78)
ANION GAP SERPL CALC-SCNC: 6 MMOL/L (ref 5–15)
AST SERPL-CCNC: 15 U/L (ref 15–37)
BILIRUB SERPL-MCNC: 0.8 MG/DL (ref 0.2–1)
BUN SERPL-MCNC: 15 MG/DL (ref 6–20)
BUN/CREAT SERPL: 18 (ref 12–20)
CALCIUM SERPL-MCNC: 8.9 MG/DL (ref 8.5–10.1)
CHLORIDE SERPL-SCNC: 103 MMOL/L (ref 97–108)
CHOLEST SERPL-MCNC: 156 MG/DL
CO2 SERPL-SCNC: 27 MMOL/L (ref 21–32)
CREAT SERPL-MCNC: 0.83 MG/DL (ref 0.7–1.3)
EST. AVERAGE GLUCOSE BLD GHB EST-MCNC: 209 MG/DL
GLOBULIN SER CALC-MCNC: 3.5 G/DL (ref 2–4)
GLUCOSE SERPL-MCNC: 218 MG/DL (ref 65–100)
HBA1C MFR BLD: 8.9 % (ref 4–5.6)
HDLC SERPL-MCNC: 44 MG/DL
HDLC SERPL: 3.5 {RATIO} (ref 0–5)
LDLC SERPL CALC-MCNC: 87.2 MG/DL (ref 0–100)
POTASSIUM SERPL-SCNC: 4 MMOL/L (ref 3.5–5.1)
PROT SERPL-MCNC: 7.3 G/DL (ref 6.4–8.2)
SODIUM SERPL-SCNC: 136 MMOL/L (ref 136–145)
TRIGL SERPL-MCNC: 124 MG/DL (ref ?–150)
VLDLC SERPL CALC-MCNC: 24.8 MG/DL

## 2022-07-22 NOTE — PROGRESS NOTES
Ash Stoddard,  I have reviewed your results. Fasting sugar is still very high. Average sugar A1c has improved, but still significantly elevated and not at expected goal.  Strongly recommend to start your Monjouro injection every week along with exercise and diet control that we discussed during office visit. Cholesterol results are excellent and kidney and liver functions are normal.  Please let me know if you have any questions, thanks.

## 2022-08-30 DIAGNOSIS — I10 ESSENTIAL HYPERTENSION: ICD-10-CM

## 2022-08-30 RX ORDER — LISINOPRIL 10 MG/1
TABLET ORAL
Qty: 30 TABLET | Refills: 2 | Status: SHIPPED | OUTPATIENT
Start: 2022-08-30

## 2022-09-25 DIAGNOSIS — E11.9 CONTROLLED TYPE 2 DIABETES MELLITUS WITHOUT COMPLICATION, WITHOUT LONG-TERM CURRENT USE OF INSULIN (HCC): ICD-10-CM

## 2022-09-26 RX ORDER — TIRZEPATIDE 2.5 MG/.5ML
INJECTION, SOLUTION SUBCUTANEOUS
Qty: 2 ML | Refills: 1 | Status: SHIPPED | OUTPATIENT
Start: 2022-09-26 | End: 2022-09-29

## 2022-09-29 DIAGNOSIS — E11.9 CONTROLLED TYPE 2 DIABETES MELLITUS WITHOUT COMPLICATION, WITHOUT LONG-TERM CURRENT USE OF INSULIN (HCC): ICD-10-CM

## 2022-09-29 RX ORDER — TIRZEPATIDE 2.5 MG/.5ML
INJECTION, SOLUTION SUBCUTANEOUS
Qty: 2 ML | Refills: 1 | Status: SHIPPED | OUTPATIENT
Start: 2022-09-29

## 2022-10-03 ENCOUNTER — TELEPHONE (OUTPATIENT)
Dept: FAMILY MEDICINE CLINIC | Age: 47
End: 2022-10-03

## 2022-10-03 NOTE — TELEPHONE ENCOUNTER
Patient called to check the status of authorization of his mounjaro 2.5 mg/0.5 ml    Requesting a call back    Best call back #[phone]

## 2022-10-04 NOTE — TELEPHONE ENCOUNTER
Called, spoke to pharmacy. Two pt identifiers confirmed. Spoke to pharmacy and they stated that patient needed a prior auth for Cassidy Hu  but the [facility].

## 2022-10-04 NOTE — TELEPHONE ENCOUNTER
I will route to pharmacist who was working with the patient.  If we can not get approval, will consider alternate Rx.thanks

## 2022-10-05 ENCOUNTER — OFFICE VISIT (OUTPATIENT)
Dept: FAMILY MEDICINE CLINIC | Age: 47
End: 2022-10-05
Payer: COMMERCIAL

## 2022-10-05 VITALS
BODY MASS INDEX: 27.3 KG/M2 | HEART RATE: 73 BPM | SYSTOLIC BLOOD PRESSURE: 115 MMHG | DIASTOLIC BLOOD PRESSURE: 74 MMHG | HEIGHT: 71 IN | WEIGHT: 195 LBS | TEMPERATURE: 97.4 F | RESPIRATION RATE: 16 BRPM | OXYGEN SATURATION: 99 %

## 2022-10-05 DIAGNOSIS — E11.9 CONTROLLED TYPE 2 DIABETES MELLITUS WITHOUT COMPLICATION, WITHOUT LONG-TERM CURRENT USE OF INSULIN (HCC): Primary | ICD-10-CM

## 2022-10-05 DIAGNOSIS — E78.5 DYSLIPIDEMIA, GOAL LDL BELOW 100: ICD-10-CM

## 2022-10-05 DIAGNOSIS — I10 ESSENTIAL HYPERTENSION: ICD-10-CM

## 2022-10-05 DIAGNOSIS — Z82.49 FAMILY HISTORY OF EARLY CAD: ICD-10-CM

## 2022-10-05 LAB
ALBUMIN SERPL-MCNC: 3.9 G/DL (ref 3.5–5)
ALBUMIN/GLOB SERPL: 1.1 {RATIO} (ref 1.1–2.2)
ALP SERPL-CCNC: 64 U/L (ref 45–117)
ALT SERPL-CCNC: 28 U/L (ref 12–78)
ANION GAP SERPL CALC-SCNC: 4 MMOL/L (ref 5–15)
AST SERPL-CCNC: 12 U/L (ref 15–37)
BILIRUB SERPL-MCNC: 0.7 MG/DL (ref 0.2–1)
BUN SERPL-MCNC: 15 MG/DL (ref 6–20)
BUN/CREAT SERPL: 20 (ref 12–20)
CALCIUM SERPL-MCNC: 9 MG/DL (ref 8.5–10.1)
CHLORIDE SERPL-SCNC: 104 MMOL/L (ref 97–108)
CO2 SERPL-SCNC: 29 MMOL/L (ref 21–32)
CREAT SERPL-MCNC: 0.74 MG/DL (ref 0.7–1.3)
CREAT UR-MCNC: 161 MG/DL
EST. AVERAGE GLUCOSE BLD GHB EST-MCNC: 146 MG/DL
GLOBULIN SER CALC-MCNC: 3.5 G/DL (ref 2–4)
GLUCOSE SERPL-MCNC: 142 MG/DL (ref 65–100)
HBA1C MFR BLD: 6.7 % (ref 4–5.6)
MICROALBUMIN UR-MCNC: 1.9 MG/DL
MICROALBUMIN/CREAT UR-RTO: 12 MG/G (ref 0–30)
POTASSIUM SERPL-SCNC: 4.2 MMOL/L (ref 3.5–5.1)
PROT SERPL-MCNC: 7.4 G/DL (ref 6.4–8.2)
SODIUM SERPL-SCNC: 137 MMOL/L (ref 136–145)

## 2022-10-05 PROCEDURE — 3052F HG A1C>EQUAL 8.0%<EQUAL 9.0%: CPT | Performed by: FAMILY MEDICINE

## 2022-10-05 PROCEDURE — 99213 OFFICE O/P EST LOW 20 MIN: CPT | Performed by: FAMILY MEDICINE

## 2022-10-05 NOTE — PROGRESS NOTES
.  Chief Complaint   Patient presents with    Diabetes     Fating and follow up         1. \"Have you been to the ER, urgent care clinic since your last visit? Hospitalized since your last visit? \" No    2. \"Have you seen or consulted any other health care providers outside of the [address] [place] [address] since your last visit? \" No     3. For patients aged 39-70: Has the patient had a colonoscopy / FIT/ Cologuard? Yes - no Care Gap present      If the patient is female:    4. For patients aged 41-77: Has the patient had a mammogram within the past 2 years? NA - based on age or sex      11. For patients aged 21-65: Has the patient had a pap smear?  NA - based on age or sex         3 most recent PHQ Screens 7/21/2022   Little interest or pleasure in doing things Not at all   Feeling down, depressed, irritable, or hopeless Not at all   Total Score PHQ 2 0       Health Maintenance Due   Topic Date Due    DTaP/Tdap/Td series (1 - Tdap) Never done    COVID-19 Vaccine (3 - Booster for Pfizer series) 10/13/2021    Flu Vaccine (1) Never done

## 2022-10-05 NOTE — PROGRESS NOTES
HISTORY OF PRESENT ILLNESS  Ervin Cesar is a 52 y.o. male. Seen today for 3 months follow-up appointment on diabetes, hypertension and dyslipidemia. HPI  Endocrine Review  He is seen for diabetes. Since last visit he reports: no polyuria or polydipsia, no chest pain, dyspnea or TIA's, no numbness, tingling or pain in extremities, no unusual visual symptoms, no hypoglycemia, no medication side effects noted, weight has decreased, no significant changes. Testing: is not performed. He reports medication compliance: taking Metformin one time only. Medication side effects: none. Diabetic diet compliance: compliant all of the time. Lab review:   After last visit, his Metformin dose was increased to 1 g twice daily and glipizide was changed to glimepiride to improve compliance. He is now on glimepiride 2 mg 2 times daily before meals as well as metformin 1 g twice daily after meals. He has insurance issues for coverage of diabetes medications and recently he is started on Mounjaro injections. Lab Results   Component Value Date/Time    Hemoglobin A1c 8.9 (H) 07/21/2022 10:13 AM           Cardiovascular Review  The patient has hypertension, hyperlipidemia and Family h/o CAD. He reports taking medications as instructed, no medication side effects noted, patient does not perform home BP monitoring, no chest pain on exertion, no dyspnea on exertion, no orthostatic dizziness or lightheadedness, no orthopnea or paroxysmal nocturnal dyspnea, no palpitations, no intermittent claudication symptoms. Diet and Lifestyle: generally follows a low fat low cholesterol diet, generally follows a low sodium diet, exercises regularly, nonsmoker. Lab review: no lab studies available for review at time of visit.     Medicines: Lisinopril 10 mg and atorvastatin 10 mg   Lab Results   Component Value Date/Time    Cholesterol, total 156 07/21/2022 10:13 AM    HDL Cholesterol 44 07/21/2022 10:13 AM    LDL, calculated 87.2 07/21/2022 10:13 AM    VLDL, calculated 24.8 07/21/2022 10:13 AM    Triglyceride 124 07/21/2022 10:13 AM    CHOL/HDL Ratio 3.5 07/21/2022 10:13 AM     The 10-year ASCVD risk score (Kesha JACKSON, et al., 2019) is: 3.7%    Values used to calculate the score:      Age: 52 years      Sex: Male      Is Non- : No      Diabetic: Yes      Tobacco smoker: No      Systolic Blood Pressure: 029 mmHg      Is BP treated: Yes      HDL Cholesterol: 44 MG/DL      Total Cholesterol: 156 MG/DL    Review of Systems   Constitutional:  Negative for chills, fever and malaise/fatigue. HENT:  Negative for congestion, ear pain, sore throat and tinnitus. Eyes:  Negative for blurred vision, double vision, pain and discharge. Respiratory:  Negative for cough, shortness of breath and wheezing. Cardiovascular:  Negative for chest pain, palpitations and leg swelling. Gastrointestinal:  Negative for abdominal pain, blood in stool, constipation, diarrhea, nausea and vomiting. Genitourinary:  Negative for dysuria, frequency, hematuria and urgency. Musculoskeletal:  Negative for back pain, joint pain and myalgias. Skin:  Negative for rash. Neurological:  Negative for dizziness, tremors, seizures and headaches. Endo/Heme/Allergies:  Negative for polydipsia. Does not bruise/bleed easily. Psychiatric/Behavioral:  Negative for depression and substance abuse. The patient is not nervous/anxious. Physical Exam  Vitals and nursing note reviewed. Constitutional:       Appearance: He is well-developed. He is not diaphoretic. HENT:      Head: Normocephalic and atraumatic. Right Ear: External ear normal.      Mouth/Throat:      Pharynx: No oropharyngeal exudate. Eyes:      General: No scleral icterus. Conjunctiva/sclera: Conjunctivae normal.      Pupils: Pupils are equal, round, and reactive to light. Neck:      Thyroid: No thyromegaly. Vascular: No JVD.    Cardiovascular:      Rate and Rhythm: Normal rate and regular rhythm. Pulses:           Dorsalis pedis pulses are 2+ on the right side and 2+ on the left side. Posterior tibial pulses are 2+ on the right side and 2+ on the left side. Heart sounds: Normal heart sounds. No murmur heard. Pulmonary:      Effort: Pulmonary effort is normal.      Breath sounds: Normal breath sounds. No wheezing. Abdominal:      General: Bowel sounds are normal. There is no distension. Palpations: Abdomen is soft. There is no mass. Musculoskeletal:         General: No tenderness. Normal range of motion. Cervical back: Normal range of motion and neck supple. Feet:      Right foot:      Protective Sensation: 10 sites tested. 10 sites sensed. Skin integrity: Skin integrity normal.      Toenail Condition: Right toenails are normal.      Left foot:      Protective Sensation: 10 sites tested. 10 sites sensed. Skin integrity: Skin integrity normal.      Toenail Condition: Left toenails are normal.   Lymphadenopathy:      Cervical: No cervical adenopathy. Skin:     General: Skin is warm and dry. Findings: No rash. Neurological:      Mental Status: He is alert and oriented to person, place, and time. Cranial Nerves: No cranial nerve deficit. Deep Tendon Reflexes: Reflexes are normal and symmetric. Reflexes normal.       ASSESSMENT and PLAN  Diagnoses and all orders for this visit:    1. Controlled type 2 diabetes mellitus without complication, without long-term current use of insulin ([address]  Assessment & Plan:   borderline controlled, continue current medications pending work up below, medication adherence emphasized, lifestyle modifications recommended    Orders:  -     METABOLIC PANEL, COMPREHENSIVE; Future  -     HEMOGLOBIN A1C WITH EAG; Future  -      DIABETES FOOT EXAM  -     MICROALBUMIN, UR, RAND W/ MICROALB/CREAT RATIO; Future  -     METABOLIC PANEL, COMPREHENSIVE; Future    2.  Essential hypertension  - METABOLIC PANEL, COMPREHENSIVE; Future    3. Dyslipidemia, goal LDL below 895  -     METABOLIC PANEL, COMPREHENSIVE; Future    4. Family history of early CAD  Discussed lifestyle issues and health guidance given  Patient was given an after visit summary which includes diagnoses, vital signs, current medications, instructions and references & authorized prescriptions . Results of labs will be conveyed to patient, once available. Pt verbalized instructions I provided and expressed understanding of discussion that was held today. Follow-up and Dispositions    Return in about 3 months (around 1/5/2023) for follow up, fasting. Please note that this dictation was completed with [employer], the [employer] voice recognition software. Quite often unanticipated grammatical, syntax, homophones, and other interpretive errors are inadvertently transcribed by the computer software. Please disregard these errors. Please excuse any errors that have escaped final proofreading. Thank you.

## 2022-10-06 ENCOUNTER — TELEPHONE (OUTPATIENT)
Dept: INTERNAL MEDICINE CLINIC | Age: 47
End: 2022-10-06

## 2022-10-06 NOTE — TELEPHONE ENCOUNTER
Pharmacy Progress Note - Telephone Encounter    S/O: Mr. Sharon Telles 52 y.o. male, referred by Dr. Martin Zuniga MD, was contacted via an outbound telephone call to discuss access to [facility] today. Verified patients identifiers (name & ) per HIPAA policy. - Pt has been out of [facility] x1 week. He attempted to refill Rx but the pharmacy stated he requried a PA.    - This writer contacted the pharmacy to investigate coverage/coupons. Coupon info was provided. Rx still did not go thru without insurance PA.    - This writer started PA process. Richi Agarwal (Key: [id number])    There are no discontinued medications. No orders of the defined types were placed in this encounter.       Aretha Dash, PharmD, BCGP, BCACP  Clinical Pharmacist Specialist      For Pharmacy Admin Tracking Only    CPA in place: Yes  Recommendation Provided To: Patient/Caregiver: 1 via Telephone, Pharmacy: 1, and Other: 1  Intervention Detail: Patient Access Assistance/Sample Provided  Intervention Accepted By: Patient/Caregiver: 1, Pharmacy: 1, and Other: 1  Time Spent (min): 30

## 2022-10-07 NOTE — PROGRESS NOTES
Ash Stoddard,  I am so happy that your average sugar and fasting sugar both have improved significantly. A1c is down to 6.7. Your urine is also negative for protein. Kidney and liver functions are normal.  We will continue on current medications and Ms. Delfino Mendoza is working on prior authorization on [employer], if we do not get, will change to [employer] as we discussed. You continue with rest of your medications as well as diet and exercise. Please let me know if you have any questions, thanks.

## 2022-10-07 NOTE — PROGRESS NOTES
Called, spoke to pt. Two pt identifiers confirmed. Writer informed patient of abn lab and recommendation. Pt verbalized understanding of information discussed w/ no further questions at this time.

## 2022-10-10 ENCOUNTER — TELEPHONE (OUTPATIENT)
Dept: FAMILY MEDICINE CLINIC | Age: 47
End: 2022-10-10

## 2022-10-10 NOTE — TELEPHONE ENCOUNTER
Pharmacy Progress Note - Telephone Encounter    Mr. David Stewart 52 y.o. male, referred by Dr. Flor Rizzo MD, was contacted via an outbound telephone call to discuss [facility] PA today. Verified patients identifiers (name & ) per HIPAA policy.     Abundio JONES was approved. Pt was informed and told to  med. - Scheduled f/up appt with this writer in 1 mo on 22 at [facility]    - Patient endorses understanding to the provided information. All questions answered at this time. There are no discontinued medications. No orders of the defined types were placed in this encounter.       Lazaro Mcrae, PharmD, BCGP, BCACP  Clinical Pharmacist Specialist      For Pharmacy Admin Tracking Only    CPA in place: Yes  Recommendation Provided To: Patient/Caregiver: 1 via Telephone  Intervention Detail: Patient Access Assistance/Sample Provided  Intervention Accepted By: Patient/Caregiver: 1  Time Spent (min): 10

## 2022-10-13 ENCOUNTER — TELEPHONE (OUTPATIENT)
Dept: INTERNAL MEDICINE CLINIC | Age: 47
End: 2022-10-13

## 2022-10-13 DIAGNOSIS — E11.9 CONTROLLED TYPE 2 DIABETES MELLITUS WITHOUT COMPLICATION, WITHOUT LONG-TERM CURRENT USE OF INSULIN (HCC): ICD-10-CM

## 2022-10-13 RX ORDER — GLIMEPIRIDE 2 MG/1
2 TABLET ORAL 2 TIMES DAILY
Qty: 180 TABLET | Refills: 1 | Status: SHIPPED | OUTPATIENT
Start: 2022-10-13

## 2022-10-13 RX ORDER — METFORMIN HYDROCHLORIDE 1000 MG/1
1000 TABLET ORAL 2 TIMES DAILY WITH MEALS
Qty: 180 TABLET | Refills: 1 | Status: SHIPPED | OUTPATIENT
Start: 2022-10-13

## 2022-10-13 NOTE — TELEPHONE ENCOUNTER
MD Joel Shaw,    Per notes, patient takes 2mg twice daily. Updated Rx for this dose as previous order states 2 tablsts qam and 1 qpm before meals. Chart Note:    He is now on glimepiride 2 mg 2 times daily before meals     Updated rx as noted at visit if appropriate. Thanks, Tamia Wayne    Last Visit: 10/5/22 MD Joel Shaw  Next Appointment: 1/18/23 MD Joel Shaw  Previous Refill Encounter(s):   Glimepiride 5/29/22 90 + 1  Metformin- historical provider      Requested Prescriptions     Pending Prescriptions Disp Refills    glimepiride (AMARYL) 2 mg tablet 180 Tablet 1     Sig: Take 1 Tablet by mouth two (2) times a day. Before meals    metFORMIN (GLUCOPHAGE) 1,000 mg tablet 180 Tablet 1     Sig: Take 1 Tablet by mouth two (2) times daily (with meals). For [address] [facility] in place:   Recommendation Provided To:    Intervention Detail: New Rx: 2, reason: Patient Preference  Gap Closed?:   Intervention Accepted By:   Time Spent (min): 15

## 2022-10-13 NOTE — TELEPHONE ENCOUNTER
Pharmacy Progress Note - Telephone Encounter    Mr. Gavin Valladares 52 y.o. male contacted office/me via an inbound telephone call to discuss [facility] access concern today. Verified patients identifiers (name & ) per HIPAA policy. - Pt states that he attempted to complete a dose of Mounjaro. He wasted a dose by accident and attempted to get a refill.    - Contacted [facility] pharmacy and was told it was refill too soon. Pt was informed of this and told to maintain current schedule. He will be able to refill med on 22    - Patient endorses understanding to the provided information. All questions answered at this time. There are no discontinued medications. No orders of the defined types were placed in this encounter.         Mahad Curiel, Kalpana, BCGP, BCACP  Clinical Pharmacist Specialist        For Pharmacy Admin Tracking Only    CPA in place: Yes  Recommendation Provided To: Patient/Caregiver: 1 via Telephone and Pharmacy: 1  Intervention Detail: Patient Access Assistance/Sample Provided  Intervention Accepted By: Patient/Caregiver: 1 and Pharmacy: 1  Time Spent (min): 15

## 2022-11-07 DIAGNOSIS — E11.9 CONTROLLED TYPE 2 DIABETES MELLITUS WITHOUT COMPLICATION, WITHOUT LONG-TERM CURRENT USE OF INSULIN (HCC): ICD-10-CM

## 2022-11-07 RX ORDER — GLIMEPIRIDE 2 MG/1
TABLET ORAL
Qty: 90 TABLET | Refills: 1 | Status: SHIPPED | OUTPATIENT
Start: 2022-11-07 | End: 2022-11-11

## 2022-11-11 ENCOUNTER — OFFICE VISIT (OUTPATIENT)
Dept: FAMILY MEDICINE CLINIC | Age: 47
End: 2022-11-11

## 2022-11-11 VITALS
SYSTOLIC BLOOD PRESSURE: 122 MMHG | HEART RATE: 82 BPM | BODY MASS INDEX: 27.17 KG/M2 | WEIGHT: 194.8 LBS | DIASTOLIC BLOOD PRESSURE: 76 MMHG

## 2022-11-11 DIAGNOSIS — E11.9 CONTROLLED TYPE 2 DIABETES MELLITUS WITHOUT COMPLICATION, WITHOUT LONG-TERM CURRENT USE OF INSULIN (HCC): Primary | ICD-10-CM

## 2022-11-11 DIAGNOSIS — I10 ESSENTIAL HYPERTENSION: ICD-10-CM

## 2022-11-11 DIAGNOSIS — E78.5 DYSLIPIDEMIA, GOAL LDL BELOW 100: ICD-10-CM

## 2022-11-11 DIAGNOSIS — Z82.49 FAMILY HISTORY OF EARLY CAD: ICD-10-CM

## 2022-11-11 RX ORDER — TIRZEPATIDE 5 MG/.5ML
5 INJECTION, SOLUTION SUBCUTANEOUS
Qty: 2 ML | Refills: 1 | Status: SHIPPED | OUTPATIENT
Start: 2022-11-11

## 2022-11-11 RX ORDER — GLIMEPIRIDE 2 MG/1
2 TABLET ORAL
Qty: 90 TABLET | Refills: 1 | Status: SHIPPED | OUTPATIENT
Start: 2022-11-11

## 2022-11-11 RX ORDER — LISINOPRIL 10 MG/1
10 TABLET ORAL DAILY
Qty: 90 TABLET | Refills: 1 | Status: SHIPPED | OUTPATIENT
Start: 2022-11-11

## 2022-11-11 RX ORDER — METFORMIN HYDROCHLORIDE 1000 MG/1
1000 TABLET ORAL 2 TIMES DAILY WITH MEALS
Qty: 180 TABLET | Refills: 1 | Status: SHIPPED | OUTPATIENT
Start: 2022-11-11

## 2022-11-11 RX ORDER — ATORVASTATIN CALCIUM 10 MG/1
10 TABLET, FILM COATED ORAL
Qty: 90 TABLET | Refills: 1 | Status: SHIPPED | OUTPATIENT
Start: 2022-11-11

## 2022-11-11 RX ORDER — GLIMEPIRIDE 2 MG/1
2 TABLET ORAL
Qty: 90 TABLET | Refills: 1
Start: 2022-11-11 | End: 2022-11-11 | Stop reason: SDUPTHER

## 2022-11-11 NOTE — PROGRESS NOTES
Pharmacy Progress Note - Diabetes Management    S/O: Mr. Vazquez Ackerman is a 52 y.o. male, referred by Dr. Bryant Patel MD, with a PMH of T2DM, was seen today for diabetes management. Patient's last A1c was 6.7% (Oct 2022) which is decreased from 8.9% (July 2022) which is decreased from 9.4% (April 2022). Interim update: Pt was last seen by PCP on 10/5/22 for f/up on chronic conditions. Pt had been started on Mounjaro in Sept 2022 and new A1c draw showed significant improvement. Pt arrives to clinic today and states he is using a coupon to help with [facility].  He is experiencing bloating with this agent. Taking Glimepiride QAM only. Current anti-hyperglycemic regimen includes:    - Metformin 1000 mg BID  - Glimepiride 2 mg 2 tabs QAM   - Mounjaro 2.5 mg weekly    ROS:  Today, Pt endorses:  - Symptoms of Hyperglycemia: none  - Symptoms of Hypoglycemia: none    Self Monitoring Blood Glucose (SMBG) or CGM:  - Brought in home glucometer/blood glucose log/CGM reader today:  no  - Checks BG: never  - Fasting BG in office: 115 mg/dL    Diabetes Health Maintenance:  ASCVD Risk Factors: Diabetes  ASA therapy:  none   ACE/ARB therapy: Lisinopril 10 mg  Optimized statin therapy: Atorvastatin 10 mg  The 10-year ASCVD risk score (Kesha DK, et al., 2019) is: 3.7%    LDL: 87.2 mg/dL (July 2022)  Nicotine dependence: No      Vitals:   Wt Readings from Last 3 Encounters:   10/05/22 195 lb (88.5 kg)   07/21/22 200 lb 6.4 oz (90.9 kg)   07/13/22 203 lb (92.1 kg)     BP Readings from Last 3 Encounters:   10/05/22 115/74   07/21/22 114/75   07/13/22 124/85     Pulse Readings from Last 3 Encounters:   10/05/22 73   07/21/22 73   07/13/22 73       Past Medical History:   Diagnosis Date    Diabetes ([facility] [address]     Hypertension      No Known Allergies    Current Outpatient Medications   Medication Sig    glimepiride (AMARYL) 2 mg tablet TAKE 2 TABLETS BY MOUTH EVERY MORNING AND TAKE 1 TABLET BY MOUTH EVERY EVENING BEFORE MEALS    metFORMIN (GLUCOPHAGE) 1,000 mg tablet Take 1 Tablet by mouth two (2) times daily (with meals). Mounjaro 2.5 mg/0.5 mL pnij INJECT 2.5 MG UNDER THE SKIN EVERY 7 DAYS    lisinopriL (PRINIVIL, ZESTRIL) 10 mg tablet TAKE ONE TABLET BY MOUTH DAILY    atorvastatin (LIPITOR) 10 mg tablet TAKE ONE TABLET BY MOUTH EVERY NIGHT AT BEDTIME     No current facility-administered medications for this visit. Lab Results   Component Value Date/Time    Sodium 137 10/05/2022 10:15 AM    Potassium 4.2 10/05/2022 10:15 AM    Chloride 104 10/05/2022 10:15 AM    CO2 29 10/05/2022 10:15 AM    Anion gap 4 (L) 10/05/2022 10:15 AM    Glucose 142 (H) 10/05/2022 10:15 AM    BUN 15 10/05/2022 10:15 AM    Creatinine 0.74 10/05/2022 10:15 AM    BUN/Creatinine ratio 20 10/05/2022 10:15 AM    GFR est AA >60 07/21/2022 10:13 AM    GFR est non-AA >60 07/21/2022 10:13 AM    Calcium 9.0 10/05/2022 10:15 AM    Bilirubin, total 0.7 10/05/2022 10:15 AM    Alk.  phosphatase 64 10/05/2022 10:15 AM    Protein, total 7.4 10/05/2022 10:15 AM    Albumin 3.9 10/05/2022 10:15 AM    Globulin 3.5 10/05/2022 10:15 AM    A-G Ratio 1.1 10/05/2022 10:15 AM    ALT (SGPT) 28 10/05/2022 10:15 AM     Lab Results   Component Value Date/Time    Cholesterol, total 156 07/21/2022 10:13 AM    HDL Cholesterol 44 07/21/2022 10:13 AM    LDL, calculated 87.2 07/21/2022 10:13 AM    VLDL, calculated 24.8 07/21/2022 10:13 AM    Triglyceride 124 07/21/2022 10:13 AM    CHOL/HDL Ratio 3.5 07/21/2022 10:13 AM     Lab Results   Component Value Date/Time    WBC 5.0 11/30/2020 10:37 AM    HGB 15.1 11/30/2020 10:37 AM    HCT 46.4 11/30/2020 10:37 AM    PLATELET 516 99/61/1688 10:37 AM    MCV 88.7 11/30/2020 10:37 AM       Lab Results   Component Value Date/Time    Microalbumin/Creat ratio (mg/g creat) 12 10/05/2022 10:15 AM    Microalbumin,urine random 1.90 10/05/2022 10:15 AM       Lab Results   Component Value Date/Time    Hemoglobin A1c 6.7 (H) 10/05/2022 10:15 AM Hemoglobin A1c 8.9 (H) 2022 10:13 AM    Hemoglobin A1c 9.4 (H) 2022 11:26 AM     No results found for: [id number]     CrCl cannot be calculated (Unknown ideal weight. ). A/P:    1) T2DM: Per ADA guidelines, Pt's A1c is at goal of < 7%. Current SMBG(s)/CGM trend is unknown as pt does not check; however, one rdg available was controlled. Most recent A1c is also controlled. In order to decrease pill burden will stop Glimepiride and increase Mounjaro to maintain BG control. Pt is going on a trip and requests 90 day supply refills for meds.  - STOP Glimepiride  - INCREASE Mounjaro to 5 mg weekly  - Refills provided      Medication reconciliation was completed during the visit. Medications Discontinued During This Encounter   Medication Reason    Mounjaro 2.5 mg/0.5 mL pnij DOSE ADJUSTMENT    glimepiride (AMARYL) 2 mg tablet     atorvastatin (LIPITOR) 10 mg tablet REORDER    lisinopriL (PRINIVIL, ZESTRIL) 10 mg tablet REORDER    metFORMIN (GLUCOPHAGE) 1,000 mg tablet REORDER    glimepiride (AMARYL) 2 mg tablet REORDER     Orders Placed This Encounter    DISCONTD: glimepiride (AMARYL) 2 mg tablet     Sig: Take 1 Tablet by mouth every morning. Dispense:  90 Tablet     Refill:  1    tirzepatide (Mounjaro) 5 mg/0.5 mL pnij     Si mg by SubCUTAneous route every seven (7) days. Dispense:  2 mL     Refill:  1    metFORMIN (GLUCOPHAGE) 1,000 mg tablet     Sig: Take 1 Tablet by mouth two (2) times daily (with meals). Dispense:  180 Tablet     Refill:  1    lisinopriL (PRINIVIL, ZESTRIL) 10 mg tablet     Sig: Take 1 Tablet by mouth daily. Dispense:  90 Tablet     Refill:  1    glimepiride (AMARYL) 2 mg tablet     Sig: Take 1 Tablet by mouth every morning. Dispense:  90 Tablet     Refill:  1    atorvastatin (LIPITOR) 10 mg tablet     Sig: Take 1 Tablet by mouth nightly.      Dispense:  90 Tablet     Refill:  1       Patient verbalized understanding of the information presented and all of the patients questions were answered. AVS was handed to the patient. Patient advised to call the office with any additional questions or concerns. Notifications of recommendations will be sent to Dr. Kandi Patino MD for review. Patient will return to clinic in 6 week(s) for follow up.      Pepito Charlton, PharmD, BCGP, BCACP  Clinical Pharmacist Specialist          For Pharmacy Admin Tracking Only    CPA in place: Yes  Recommendation Provided To: Patient/Caregiver: 6 via In person  Intervention Detail: Discontinued Rx: 2, reason: Therapy Complete, Dose Adjustment: 1, reason: Therapy Optimization, New Rx: 1, reason: Needs Additional Therapy, Refill(s) Provided, and Scheduled Appointment  Intervention Accepted By: Patient/Caregiver: 6  Time Spent (min): 45

## 2022-12-20 DIAGNOSIS — I10 ESSENTIAL HYPERTENSION: ICD-10-CM

## 2022-12-20 RX ORDER — LISINOPRIL 10 MG/1
TABLET ORAL
Qty: 60 TABLET | Refills: 0 | Status: SHIPPED | OUTPATIENT
Start: 2022-12-20

## 2023-01-09 DIAGNOSIS — E11.9 CONTROLLED TYPE 2 DIABETES MELLITUS WITHOUT COMPLICATION, WITHOUT LONG-TERM CURRENT USE OF INSULIN (HCC): ICD-10-CM

## 2023-01-10 RX ORDER — TIRZEPATIDE 5 MG/.5ML
5 INJECTION, SOLUTION SUBCUTANEOUS
Qty: 2 ML | Refills: 1 | Status: SHIPPED | OUTPATIENT
Start: 2023-01-10

## 2023-01-10 RX ORDER — TIRZEPATIDE 2.5 MG/.5ML
INJECTION, SOLUTION SUBCUTANEOUS
Qty: 2 UNSPECIFIED | Refills: 2 | OUTPATIENT
Start: 2023-01-10

## 2023-01-13 ENCOUNTER — OFFICE VISIT (OUTPATIENT)
Dept: FAMILY MEDICINE CLINIC | Age: 48
End: 2023-01-13

## 2023-01-13 VITALS — BODY MASS INDEX: 26.97 KG/M2 | WEIGHT: 193.4 LBS

## 2023-01-13 DIAGNOSIS — E11.9 CONTROLLED TYPE 2 DIABETES MELLITUS WITHOUT COMPLICATION, WITHOUT LONG-TERM CURRENT USE OF INSULIN (HCC): Primary | ICD-10-CM

## 2023-01-13 NOTE — PATIENT INSTRUCTIONS
Contact multiple pharmacies asking about Mounjaro 5 mg dose in stock. Call me if you are not able to get this medication.     Pharmacist Contact Information:  Chloe Eli, PharmD, Doni Oliveira, [address]  Work Cell: [phone]

## 2023-01-13 NOTE — PROGRESS NOTES
Pharmacy Progress Note - Diabetes Management    S/O: Mr. Leanne Parker is a 52 y.o. male, referred by Dr. John Pulido MD, with a PMH of ***, was seen today for diabetes management. Patient's last A1c was ***. Interim update: ***    Hasn't taken mounjaro in 3 weeks  [facility] - [facility]      No bg rdgs    Started taking glimepiride 2 mg 2 tabs QAM to compesate when not taking mounjaro    Notes weight increased  Was 181 and now 193.4    Advised to call other pharmacies to find it instock        Current anti-hyperglycemic regimen includes:    - Metformin 1000 mg BID  - Glimepiride 2 mg 2 tabs QAM  - Mounjaro 5 mg weekly - hasn't taken in 3 weeks    ROS:  Today, Pt endorses:  - Symptoms of Hyperglycemia: none  - Symptoms of Hypoglycemia: none    Self Monitoring Blood Glucose (SMBG) or CGM:  - Brought in home glucometer/blood glucose log/CGM reader today:  no  - Checks BG: never  - Fasting BG in office: 189    Nutrition:  - Eats {Numbers; 1-4 :14742908} meals/day   - Breakfast:  - Lunch:  - Dinner:   - Snack(s):   - Beverage(s):  - Alcohol consumption? {YES (DEF) - NO:83531::\"Yes\"}    Physical Activity:   {yes no:64814}  - Consists of ***    Diabetes Health Maintenance:  ASCVD Risk Factors: {ASCVDRisk:23105}  ASA therapy:  ***   ACE/ARB therapy: ***  Optimized statin therapy: ***  The 10-year ASCVD risk score (Kesha JACKSON, et al., 2019) is: 4.1%    LDL: ***  Nicotine dependence: {YES (DEF) - NO:70736::\"Yes\"}  Last eye exam: ***  Last foot exam: ***  Last influenza vaccine: ***  Last Pneumovax 23 vaccine: ***  Last Prevnar-13 vaccine: ***  Last Prevnar-20 vaccine: ***  Hepatitis B Series: ***   COVID-19 vaccine: ***      Vitals:   Wt Readings from Last 3 Encounters:   11/11/22 194 lb 12.8 oz (88.4 kg)   10/05/22 195 lb (88.5 kg)   07/21/22 200 lb 6.4 oz (90.9 kg)     BP Readings from Last 3 Encounters:   11/11/22 122/76   10/05/22 115/74   07/21/22 114/75     Pulse Readings from Last 3 Encounters: 11/11/22 82   10/05/22 73   07/21/22 73       Past Medical History:   Diagnosis Date    [facility]     Hypertension      No Known Allergies    Current Outpatient Medications   Medication Sig    tirzepatide (Mounjaro) 5 mg/0.5 mL pnij 5 mg by SubCUTAneous route every seven (7) days. lisinopriL (PRINIVIL, ZESTRIL) 10 mg tablet TAKE ONE TABLET BY MOUTH DAILY    metFORMIN (GLUCOPHAGE) 1,000 mg tablet Take 1 Tablet by mouth two (2) times daily (with meals). glimepiride (AMARYL) 2 mg tablet Take 1 Tablet by mouth every morning. atorvastatin (LIPITOR) 10 mg tablet Take 1 Tablet by mouth nightly. No current facility-administered medications for this visit. Lab Results   Component Value Date/Time    Sodium 137 10/05/2022 10:15 AM    Potassium 4.2 10/05/2022 10:15 AM    Chloride 104 10/05/2022 10:15 AM    CO2 29 10/05/2022 10:15 AM    Anion gap 4 (L) 10/05/2022 10:15 AM    Glucose 142 (H) 10/05/2022 10:15 AM    BUN 15 10/05/2022 10:15 AM    Creatinine 0.74 10/05/2022 10:15 AM    BUN/Creatinine ratio 20 10/05/2022 10:15 AM    GFR est AA >60 07/21/2022 10:13 AM    GFR est non-AA >60 07/21/2022 10:13 AM    Calcium 9.0 10/05/2022 10:15 AM    Bilirubin, total 0.7 10/05/2022 10:15 AM    Alk.  phosphatase 64 10/05/2022 10:15 AM    Protein, total 7.4 10/05/2022 10:15 AM    Albumin 3.9 10/05/2022 10:15 AM    Globulin 3.5 10/05/2022 10:15 AM    A-G Ratio 1.1 10/05/2022 10:15 AM    ALT (SGPT) 28 10/05/2022 10:15 AM     Lab Results   Component Value Date/Time    Cholesterol, total 156 07/21/2022 10:13 AM    HDL Cholesterol 44 07/21/2022 10:13 AM    LDL, calculated 87.2 07/21/2022 10:13 AM    VLDL, calculated 24.8 07/21/2022 10:13 AM    Triglyceride 124 07/21/2022 10:13 AM    CHOL/HDL Ratio 3.5 07/21/2022 10:13 AM     Lab Results   Component Value Date/Time    WBC 5.0 11/30/2020 10:37 AM    HGB 15.1 11/30/2020 10:37 AM    HCT 46.4 11/30/2020 10:37 AM    PLATELET 752 10/36/1225 10:37 AM    MCV 88.7 11/30/2020 10:37 AM Lab Results   Component Value Date/Time    Microalbumin/Creat ratio (mg/g creat) 12 10/05/2022 10:15 AM    Microalbumin,urine random 1.90 10/05/2022 10:15 AM       Lab Results   Component Value Date/Time    Hemoglobin A1c 6.7 (H) 10/05/2022 10:15 AM    Hemoglobin A1c 8.9 (H) 07/21/2022 10:13 AM    Hemoglobin A1c 9.4 (H) 04/20/2022 11:26 AM     No results found for: [id number]     CrCl cannot be calculated (Unknown ideal weight. ). A/P:    1) T2DM: Per ADA guidelines, Pt's A1c {IS/IS NOT:04159} at goal of < ***. Current SMBG(s)/CGM trend ***  -     2) HTN: BP {IS/IS NOT:55214} at goal of < ***. Currently taking ***  -     3) Primary Prevention ASCVD: Per ADA guidelines, pts age 43-69 yrs are recommended for statin therapy. Currently taking ***  -       Nutrition/Lifestyle Modifications:  - Pt is  - Recommend  - Recommend ~30 minutes consistent, moderately intensive, exercise/day or ~150 minutes/week. Start small, stay consistent, and increase length and types of exercise, as tolerated. - Discuss goal is to reduce at least 7% of current total body weight. Based on current weight, this would be *** lbs.   - Educate pt about reading nutrition labels w/ a focus on carbohydrate/protein/sugar/fiber/fat content.  - Recommend moderating and diversifying carbohydrate intake to max of~45-60 grams/meal and 15 grams/snack ; at least 1 serving of protein/meal.  Reviewed low-carb alternatives to existing food choices. - Provided ADA resources on 1500 calories diet, nutrition label, food groups to assist Pt w/ decision making/meal planning. Established patient-centered goal(s) to follow up on at the next visit:      Resources provided:  -     Check: {Plan-TT:73001} at the next visit. Give: {Plan-TT:91937} at the next visit. Medication reconciliation was completed during the visit. There are no discontinued medications. No orders of the defined types were placed in this encounter.       Patient verbalized understanding of the information presented and all of the patients questions were answered. AVS was handed to the patient. Patient advised to call the office with any additional questions or concerns. Notifications of recommendations will be sent to Dr. Edgar Alva MD for review. Patient will return to clinic in {numbers 1-12:30096} week(s) for follow up.      Martín Urbina, PharmD, BCGP, BCACP  Clinical Pharmacist Specialist          {[facility]

## 2023-01-18 ENCOUNTER — OFFICE VISIT (OUTPATIENT)
Dept: FAMILY MEDICINE CLINIC | Age: 48
End: 2023-01-18
Payer: COMMERCIAL

## 2023-01-18 VITALS
WEIGHT: 191.8 LBS | TEMPERATURE: 98.5 F | RESPIRATION RATE: 16 BRPM | BODY MASS INDEX: 26.85 KG/M2 | SYSTOLIC BLOOD PRESSURE: 114 MMHG | HEART RATE: 76 BPM | DIASTOLIC BLOOD PRESSURE: 76 MMHG | HEIGHT: 71 IN | OXYGEN SATURATION: 99 %

## 2023-01-18 DIAGNOSIS — E11.9 CONTROLLED TYPE 2 DIABETES MELLITUS WITHOUT COMPLICATION, WITHOUT LONG-TERM CURRENT USE OF INSULIN (HCC): Primary | ICD-10-CM

## 2023-01-18 DIAGNOSIS — Z82.49 FAMILY HISTORY OF EARLY CAD: ICD-10-CM

## 2023-01-18 DIAGNOSIS — I10 ESSENTIAL HYPERTENSION: ICD-10-CM

## 2023-01-18 DIAGNOSIS — E78.5 DYSLIPIDEMIA, GOAL LDL BELOW 100: ICD-10-CM

## 2023-01-18 PROCEDURE — 3074F SYST BP LT 130 MM HG: CPT | Performed by: FAMILY MEDICINE

## 2023-01-18 PROCEDURE — 99214 OFFICE O/P EST MOD 30 MIN: CPT | Performed by: FAMILY MEDICINE

## 2023-01-18 PROCEDURE — 3078F DIAST BP <80 MM HG: CPT | Performed by: FAMILY MEDICINE

## 2023-01-18 NOTE — PROGRESS NOTES
HISTORY OF PRESENT ILLNESS  Blue Oakes is a 52 y.o. male. Patient was seen today for follow-up on diabetes, dyslipidemia, hypertension and family history of coronary artery disease. He visited to [facility] last month. His insurance is not covering for Mounjaro injection this year  HPI  Endocrine Review  He is seen for diabetes. Since last visit he reports: no polyuria or polydipsia, no chest pain, dyspnea or TIA's, no numbness, tingling or pain in extremities, no unusual visual symptoms, no hypoglycemia, no medication side effects noted, weight has decreased, no significant changes. Testing: is not performed. He reports medication compliance: taking Metformin one time only. Medication side effects: none. Diabetic diet compliance: compliant all of the time. Lab review: Labs reviewed with patient. Patient on metformin 1 g twice daily as well as recently he was started [facility] injection with significant improvement in sugar. His glimepiride was stopped during that time. This year his insurance is not covering for [facility] and he is not able to use coupons for that medication also. Until we get prior authorization, he is now back on glipizide 2 mg  Lab Results   Component Value Date/Time    Hemoglobin A1c 6.7 (H) 10/05/2022 10:15 AM             Cardiovascular Review  The patient has hypertension, hyperlipidemia and Family h/o CAD. He reports taking medications as instructed, no medication side effects noted, patient does not perform home BP monitoring, no chest pain on exertion, no dyspnea on exertion, no orthostatic dizziness or lightheadedness, no orthopnea or paroxysmal nocturnal dyspnea, no palpitations, no intermittent claudication symptoms. Diet and Lifestyle: generally follows a low fat low cholesterol diet, generally follows a low sodium diet, exercises regularly, nonsmoker. Lab review: no lab studies available for review at time of visit.     Medicines: Lisinopril 10 mg and atorvastatin 10 mg     Review of Systems   Constitutional:  Negative for chills, fever and malaise/fatigue. HENT:  Negative for congestion, ear pain, sore throat and tinnitus. Eyes:  Negative for blurred vision, double vision, pain and discharge. Respiratory:  Negative for cough, shortness of breath and wheezing. Cardiovascular:  Negative for chest pain, palpitations and leg swelling. Gastrointestinal:  Negative for abdominal pain, blood in stool, constipation, diarrhea, nausea and vomiting. Genitourinary:  Negative for dysuria, frequency, hematuria and urgency. Musculoskeletal:  Negative for back pain, joint pain and myalgias. Skin:  Negative for rash. Neurological:  Negative for dizziness, tremors, seizures and headaches. Endo/Heme/Allergies:  Negative for polydipsia. Does not bruise/bleed easily. Psychiatric/Behavioral:  Negative for depression and substance abuse. The patient is not nervous/anxious. Physical Exam  Vitals and nursing note reviewed. Constitutional:       Appearance: He is well-developed. He is not diaphoretic. HENT:      Head: Normocephalic and atraumatic. Right Ear: External ear normal.      Mouth/Throat:      Mouth: Mucous membranes are moist.      Pharynx: No oropharyngeal exudate. Eyes:      General: No scleral icterus. Conjunctiva/sclera: Conjunctivae normal.      Pupils: Pupils are equal, round, and reactive to light. Neck:      Thyroid: No thyromegaly. Vascular: No JVD. Cardiovascular:      Rate and Rhythm: Normal rate and regular rhythm. Heart sounds: Normal heart sounds. No murmur heard. Pulmonary:      Effort: Pulmonary effort is normal.      Breath sounds: Normal breath sounds. No wheezing. Abdominal:      General: Bowel sounds are normal. There is no distension. Palpations: Abdomen is soft. There is no mass. Musculoskeletal:         General: No tenderness. Normal range of motion.       Cervical back: Normal range of motion and neck supple. Lymphadenopathy:      Cervical: No cervical adenopathy. Skin:     General: Skin is warm and dry. Findings: No rash. Neurological:      Mental Status: He is alert and oriented to person, place, and time. Cranial Nerves: No cranial nerve deficit. Deep Tendon Reflexes: Reflexes are normal and symmetric. Reflexes normal.   Psychiatric:         Mood and Affect: Mood normal.         Behavior: Behavior normal.       ASSESSMENT and PLAN  Diagnoses and all orders for this visit:    1. Controlled type 2 diabetes mellitus without complication, without long-term current use of insulin ([facility]  Assessment & Plan:   well controlled, continue current medications, continue current treatment plan, medication adherence emphasized, lifestyle modifications recommended    Orders:  -     HEMOGLOBIN A1C WITH EAG; Future  -     LIPID PANEL; Future  -     METABOLIC PANEL, COMPREHENSIVE; Future    2. Essential hypertension  -     METABOLIC PANEL, COMPREHENSIVE; Future    3. Family history of early CAD  -     LIPID PANEL; Future    4. Dyslipidemia, goal LDL below 100  -     LIPID PANEL; Future  -     METABOLIC PANEL, COMPREHENSIVE; Future  I had a long discussion with patient with importance of diet and exercise along with medications he is on. We also discussed different alternate medications including SGLT2 inhibitor to improve his diabetes and due to his family history of coronary artery disease. Discussed option of starting on Jardiance or Sarah Imam if his sugar stays high. Discussed lifestyle issues and health guidance given  Patient was given an after visit summary which includes diagnoses, vital signs, current medications, instructions and references & authorized prescriptions . Results of labs will be conveyed to patient, once available. Pt verbalized instructions I provided and expressed understanding of discussion that was held today.   Follow-up and Dispositions    Return in about 3 months (around 4/18/2023) for fasting, follow up. Please note that this dictation was completed with [employer], the computer voice recognition software. Quite often unanticipated grammatical, syntax, homophones, and other interpretive errors are inadvertently transcribed by the computer software. Please disregard these errors. Please excuse any errors that have escaped final proofreading. Thank you.

## 2023-01-18 NOTE — PROGRESS NOTES
Chief Complaint   Patient presents with    Diabetes     Follow up         1. \"Have you been to the ER, urgent care clinic since your last visit? Hospitalized since your last visit? \" No    2. \"Have you seen or consulted any other health care providers outside of the [address] [place] [address] since your last visit? \" No     3. For patients aged 39-70: Has the patient had a colonoscopy / FIT/ Cologuard? Yes - no Care Gap present      If the patient is female:    4. For patients aged 41-77: Has the patient had a mammogram within the past 2 years? NA - based on age or sex      11. For patients aged 21-65: Has the patient had a pap smear?  NA - based on age or sex       Financial Resource Strain: Low Risk     Difficulty of Paying Living Expenses: Not hard at all      Food Insecurity: No Food Insecurity    Worried About [address] in the Last Year: Never true    [facility] in the Last Year: Never true        3 most recent PHQ Screens 1/18/2023   Little interest or pleasure in doing things Not at all   Feeling down, depressed, irritable, or hopeless Not at all   Total Score PHQ 2 0       Health Maintenance Due   Topic Date Due    Hepatitis B Vaccine (1 of 3 - Risk 3-dose series) Never done    DTaP/Tdap/Td series (1 - Tdap) Never done    COVID-19 Vaccine (3 - Booster for [place] series) 07/08/2021

## 2023-01-19 LAB
ALBUMIN SERPL-MCNC: 4.2 G/DL (ref 3.5–5)
ALBUMIN/GLOB SERPL: 1.2 (ref 1.1–2.2)
ALP SERPL-CCNC: 72 U/L (ref 45–117)
ALT SERPL-CCNC: 26 U/L (ref 12–78)
ANION GAP SERPL CALC-SCNC: 4 MMOL/L (ref 5–15)
AST SERPL-CCNC: 12 U/L (ref 15–37)
BILIRUB SERPL-MCNC: 0.6 MG/DL (ref 0.2–1)
BUN SERPL-MCNC: 14 MG/DL (ref 6–20)
BUN/CREAT SERPL: 18 (ref 12–20)
CALCIUM SERPL-MCNC: 9.2 MG/DL (ref 8.5–10.1)
CHLORIDE SERPL-SCNC: 103 MMOL/L (ref 97–108)
CHOLEST SERPL-MCNC: 189 MG/DL
CO2 SERPL-SCNC: 32 MMOL/L (ref 21–32)
CREAT SERPL-MCNC: 0.8 MG/DL (ref 0.7–1.3)
EST. AVERAGE GLUCOSE BLD GHB EST-MCNC: 226 MG/DL
GLOBULIN SER CALC-MCNC: 3.5 G/DL (ref 2–4)
GLUCOSE SERPL-MCNC: 226 MG/DL (ref 65–100)
HBA1C MFR BLD: 9.5 % (ref 4–5.6)
HDLC SERPL-MCNC: 53 MG/DL
HDLC SERPL: 3.6 (ref 0–5)
LDLC SERPL CALC-MCNC: 105.8 MG/DL (ref 0–100)
POTASSIUM SERPL-SCNC: 4.4 MMOL/L (ref 3.5–5.1)
PROT SERPL-MCNC: 7.7 G/DL (ref 6.4–8.2)
SODIUM SERPL-SCNC: 139 MMOL/L (ref 136–145)
TRIGL SERPL-MCNC: 151 MG/DL (ref ?–150)
VLDLC SERPL CALC-MCNC: 30.2 MG/DL

## 2023-01-20 NOTE — PROGRESS NOTES
Ash Stoddard,  I have reviewed your results. I am so frustrated that your fasting sugar and average sugar both are again in uncontrolled diabetic range. Your A1c again is more than 9.0. Your insurance is not covering for [employer]. We are trying to get prior authorization from your insurance company but meanwhile as we discussed please start on Jardiance on the top of your current metformin and glimepiride. Also make sure you take metformin twice daily instead of 1 time. If you cannot use Jardiance coupon, please let my office know. I am routing this results and message to Ms. Francesca Heimlich . Let me know if you have any questions.   thanks

## 2023-01-20 NOTE — PROGRESS NOTES
Called patient. Two patient identifiers verified. Discussed lab results per provider's note. He Verbalized understanding.  Pt stated that he didn't get the jardiance yet

## 2023-02-02 ENCOUNTER — OFFICE VISIT (OUTPATIENT)
Dept: FAMILY MEDICINE CLINIC | Age: 48
End: 2023-02-02
Payer: COMMERCIAL

## 2023-02-02 VITALS
DIASTOLIC BLOOD PRESSURE: 81 MMHG | OXYGEN SATURATION: 100 % | BODY MASS INDEX: 26.49 KG/M2 | SYSTOLIC BLOOD PRESSURE: 122 MMHG | HEART RATE: 84 BPM | TEMPERATURE: 97.5 F | WEIGHT: 189.2 LBS | RESPIRATION RATE: 16 BRPM | HEIGHT: 71 IN

## 2023-02-02 DIAGNOSIS — M75.81 ROTATOR CUFF TENDINITIS, RIGHT: ICD-10-CM

## 2023-02-02 DIAGNOSIS — M62.838 TRAPEZIUS MUSCLE SPASM: ICD-10-CM

## 2023-02-02 DIAGNOSIS — M77.8 TRICEPS TENDONITIS: Primary | ICD-10-CM

## 2023-02-02 PROCEDURE — 99213 OFFICE O/P EST LOW 20 MIN: CPT | Performed by: FAMILY MEDICINE

## 2023-02-02 RX ORDER — DICLOFENAC SODIUM 75 MG/1
75 TABLET, DELAYED RELEASE ORAL 2 TIMES DAILY
Qty: 30 TABLET | Refills: 0 | Status: SHIPPED | OUTPATIENT
Start: 2023-02-02

## 2023-02-02 NOTE — PROGRESS NOTES
Chief Complaint   Patient presents with    Neck Pain     Radiating to the right arm x 1 week         1. \"Have you been to the ER, urgent care clinic since your last visit? Hospitalized since your last visit? \" No    2. \"Have you seen or consulted any other health care providers outside of the [address] [place] [address] since your last visit? \" No     3. For patients aged 39-70: Has the patient had a colonoscopy / FIT/ Cologuard? No      If the patient is female:    4. For patients aged 41-77: Has the patient had a mammogram within the past 2 years? NA - based on age or sex      11. For patients aged 21-65: Has the patient had a pap smear?  NA - based on age or sex       Financial Resource Strain: Low Risk     Difficulty of Paying Living Expenses: Not hard at all      Food Insecurity: No Food Insecurity    Worried About [address] in the Last Year: Never true    [facility] in the Last Year: Never true        3 most recent PHQ Screens 1/18/2023   Little interest or pleasure in doing things Not at all   Feeling down, depressed, irritable, or hopeless Not at all   Total Score PHQ 2 0       Health Maintenance Due   Topic Date Due    Hepatitis B Vaccine (1 of 3 - Risk 3-dose series) Never done    COVID-19 Vaccine (3 - Booster for [place] series) 07/08/2021

## 2023-02-02 NOTE — PROGRESS NOTES
HISTORY OF PRESENT ILLNESS  Yadi Mcmillan is a 52 y.o. male. Patient was seen today for right-sided neck pain, right upper arm pain for almost a week, started on last Saturday. HPI  Neck Pain  Patient complains of neck pain. Onset of symptoms was 1 week ago, unchanged since that time. Current symptoms are pain in right side neck (throbbing, cramping in character; 6/10 in severity), stiffness in right side neck and upper back, denies weakness, denies numbness, denies paresthesias. Patient denies numbness, tingling, paresthesias in upper extremities. Patient denies weakness, diminished  strength, lack of coordination. Radiation of pain: Right side upper back and right arm event that precipitate these symptoms: Likely associated with working for long hours on the computer Patient has had no prior neck problems. Previous treatments include: none. Shoulder Pain  Patient complains of right side shoulder pain. The symptoms began a week ago Course of symptoms since onset has been symptoms have progressed to a point and plateaued. . Pain is described as overall severity = moderate, location: poorly localized, worse with overhead movements, and other associated symptoms: pain radiating to arm/hand, interference with performing ADL's and lifestyle duties. Symptoms were incited by work related injury patient had to complete project for deadline and he was continuously on computer for 13 hours for 3 consecutive days   Patient denies fever, direct or indirect shoulder injury. Therapy to date includes none. Review of Systems   Constitutional:  Negative for chills, fever and malaise/fatigue. HENT:  Negative for congestion, ear pain, sore throat and tinnitus. Eyes:  Negative for blurred vision, double vision, pain and discharge. Respiratory:  Negative for cough, shortness of breath and wheezing. Cardiovascular:  Negative for chest pain, palpitations and leg swelling.    Gastrointestinal:  Negative for abdominal pain, blood in stool, constipation, diarrhea, nausea and vomiting. Genitourinary:  Negative for dysuria, frequency, hematuria and urgency. Musculoskeletal:  Positive for neck pain. Negative for back pain, joint pain and myalgias. Right side neck, shoulder, right upper arm pain   Skin:  Negative for rash. Neurological:  Negative for dizziness, tremors, seizures and headaches. Endo/Heme/Allergies:  Negative for polydipsia. Does not bruise/bleed easily. Psychiatric/Behavioral:  Negative for depression and substance abuse. The patient is not nervous/anxious. Physical Exam  Vitals and nursing note reviewed. Constitutional:       Appearance: He is well-developed. He is not diaphoretic. HENT:      Head: Normocephalic and atraumatic. Right Ear: External ear normal.      Mouth/Throat:      Mouth: Mucous membranes are moist.      Pharynx: No oropharyngeal exudate. Eyes:      General: No scleral icterus. Conjunctiva/sclera: Conjunctivae normal.      Pupils: Pupils are equal, round, and reactive to light. Neck:      Thyroid: No thyromegaly. Vascular: No JVD. Cardiovascular:      Rate and Rhythm: Normal rate and regular rhythm. Heart sounds: Normal heart sounds. No murmur heard. Pulmonary:      Effort: Pulmonary effort is normal.      Breath sounds: Normal breath sounds. No wheezing. Abdominal:      General: Bowel sounds are normal. There is no distension. Palpations: Abdomen is soft. There is no mass. Musculoskeletal:      Right shoulder: Tenderness present. No bony tenderness. Decreased range of motion. Right upper arm: Tenderness present. No bony tenderness. Arms:       Cervical back: Normal range of motion and neck supple. Comments: Right Shoulder- Empty Can Test: Negative. Drop Arm Test: Negative. Cross-Chest Test: Positive.   NEER test Positive  Forrest Lanes' test Positive    Pain worsening with resisted extension of right elbow Lymphadenopathy:      Cervical: No cervical adenopathy. Skin:     General: Skin is warm and dry. Findings: No rash. Neurological:      Mental Status: He is alert and oriented to person, place, and time. Cranial Nerves: No cranial nerve deficit. Deep Tendon Reflexes: Reflexes are normal and symmetric. Reflexes normal.   Psychiatric:         Mood and Affect: Mood normal.         Behavior: Behavior normal.       ASSESSMENT and PLAN  Diagnoses and all orders for this visit:    1. Triceps tendonitis  -     diclofenac EC (VOLTAREN) 75 mg EC tablet; Take 1 Tablet by mouth two (2) times a day. 2. Trapezius muscle spasm  -     diclofenac EC (VOLTAREN) 75 mg EC tablet; Take 1 Tablet by mouth two (2) times a day. 3. Rotator cuff tendinitis, right  -     diclofenac EC (VOLTAREN) 75 mg EC tablet; Take 1 Tablet by mouth two (2) times a day. We discussed stretches and exercises to make, shoulder as well as tricep tendinitis. Strongly recommended to bring plenty of water to limit muscle cramping. Started on NSAID. If no improvement, will refer to physical therapy. Discussed lifestyle issues and health guidance given  Patient was given an after visit summary which includes diagnoses, vital signs, current medications, instructions and references & authorized prescriptions . Results of labs will be conveyed to patient, once available. Pt verbalized instructions I provided and expressed understanding of discussion that was held today. Please note that this dictation was completed with [employer], the [employer] voice recognition software. Quite often unanticipated grammatical, syntax, homophones, and other interpretive errors are inadvertently transcribed by the computer software. Please disregard these errors. Please excuse any errors that have escaped final proofreading. Thank you.

## 2023-02-21 ENCOUNTER — PATIENT MESSAGE (OUTPATIENT)
Dept: FAMILY MEDICINE CLINIC | Age: 48
End: 2023-02-21

## 2023-02-21 DIAGNOSIS — E11.9 CONTROLLED TYPE 2 DIABETES MELLITUS WITHOUT COMPLICATION, WITHOUT LONG-TERM CURRENT USE OF INSULIN (HCC): Primary | ICD-10-CM

## 2023-02-21 DIAGNOSIS — E11.9 CONTROLLED TYPE 2 DIABETES MELLITUS WITHOUT COMPLICATION, WITHOUT LONG-TERM CURRENT USE OF INSULIN (HCC): ICD-10-CM

## 2023-02-21 RX ORDER — TIRZEPATIDE 5 MG/.5ML
5 INJECTION, SOLUTION SUBCUTANEOUS
Qty: 4 PEN | Refills: 1 | Status: SHIPPED | OUTPATIENT
Start: 2023-02-21

## 2023-02-21 NOTE — TELEPHONE ENCOUNTER
MD Caleb Camacho,    Diagnosis code needs added to Rx for [facility] per Vijay rejection. Please add info as requested and resend rx. (Added check for Type 2 DM and E11.9 if appropriate). Thanks, Mary    Previous Refill Encounter(s): 1/10/23 4 pens (2ml) + 1    Requested Prescriptions     Pending Prescriptions Disp Refills    tirzepatide (Mounjaro) 5 mg/0.5 mL pnij 4 Pen 1     Si mg by SubCUTAneous route every seven (7) days. DX: E11.9  Indications: type 2 diabetes mellitus     For Pharmacy Admin Tracking Only    Program: Medication Refill  CPA in place:   Recommendation Provided To:    Intervention Detail: New Rx: 1, reason: Patient Preference  Intervention Accepted By:   Michael Soto Closed?:   Time Spent (min): 10

## 2023-02-22 NOTE — TELEPHONE ENCOUNTER
From: Yola Gregory  To: Kimberly Powell MD  Sent: 2/21/2023 8:50 PM EST  Subject: Brittany Agent 5 mg/0.5 mL tirzepatide - Cost Issue, if any alternate medicine    Hello Doctor,  When i went to Pharmacy for Refill of my medication Mounjaro 5 mg/0.5 mL, my Insurance is saying $600 as the cost for that [facility] medicine. [facility], they told me to check if there is an alternate medicine, could you suggest me an alternate medicine which i can use which is covered with my insurance.     Cuurio

## 2023-02-23 ENCOUNTER — TELEPHONE (OUTPATIENT)
Dept: FAMILY MEDICINE CLINIC | Age: 48
End: 2023-02-23

## 2023-02-23 DIAGNOSIS — E11.9 CONTROLLED TYPE 2 DIABETES MELLITUS WITHOUT COMPLICATION, WITHOUT LONG-TERM CURRENT USE OF INSULIN (HCC): ICD-10-CM

## 2023-02-23 NOTE — TELEPHONE ENCOUNTER
Brianna called from the Prior-Authorization Dept.  Needing to know the quanity request for the trulicity    Requesting a call back    Best call back #[phone]

## 2023-02-23 NOTE — TELEPHONE ENCOUNTER
Please let her know that is 1.5 mg every week so patient will need to 4 pens each month for 12 months.  thanks

## 2023-02-23 NOTE — TELEPHONE ENCOUNTER
Outbound call to [facility] , spoke to zana patient's Name and  verified.  Attempted to inform that the quantity is 1.5 mg every week so patient will need to 4 pens each month for 12 months but she stated that it has been cancelled paperwork will be faxed to our office

## 2023-03-02 ENCOUNTER — TELEPHONE (OUTPATIENT)
Dept: PHARMACY | Age: 48
End: 2023-03-02

## 2023-03-02 NOTE — LETTER
[address] [facility] [address]        Mr. Luis Angel Briceno  [address] [address]        Dear Farrah Sparks,    Your primary care provider recently placed a referral to our clinical pharmacy team.  If you have any questions regarding the details of this referral, please contact your primary care providers office.         If you would like to schedule for an appointment, please contact:        Niranjan Smith, PharmD, [address] [place] [address]  [address]  [phone]

## 2023-03-02 NOTE — TELEPHONE ENCOUNTER
----- Message from KARRIE Luong sent at 2/24/2023  2:27 PM EST -----  Regarding: PharmD Referral  Reason for referral: diabetes  Referring provider: Chirag Cancino MD  Referring provider office: Domingo Potter  Status of Patient: Existing Patient  Length of Appt: 60 minutes  Type of Appt: in person, virtual (via doxy - get email or phone number where link will be sent), virtual [facility] (ensure mychart access)  Patient need address?  NO  Patient should bring: medications and blood sugar readings if checking

## 2023-03-02 NOTE — TELEPHONE ENCOUNTER
Attempt made to contact patient at the home number. Left a message requesting a call back at [phone] to schedule the appointment.     Rudolph Dyson CphT  [facility] [place]   [phone]

## 2023-03-07 NOTE — TELEPHONE ENCOUNTER
Second attempt made to contact patient at the home number. Patient unavailable. Left a message asking patient to return the call to the Clinical Pharmacist Sugey at [phone] to schedule an appointment. No further patient outreach planned at this time. Letter mailed to patient with the above information.     Nova Rogers, Via [email or web address]     For Pharmacy Admin Tracking Only    Program: [address] in place: No  Gap Closed?: No  Time Spent (min): 5

## 2023-04-04 NOTE — TELEPHONE ENCOUNTER
MD Krunal Bush,    [address] [place] [address] stating insurance requires 90 d/s of the trulicity. Updated to 6ml + 1. Mary Jordan    Previous Refill Encounter(s): 23 2ml + 5    Requested Prescriptions     Pending Prescriptions Disp Refills    dulaglutide (TRULICITY) 1.5 ZY/9.4 mL sub-q pen 6 mL 1     Si.5 mL by SubCUTAneous route every seven (7) days.      For Pharmacy Admin Tracking Only    Program: Medication Refill  Intervention Detail: New Rx: 1, reason: Patient Preference  Time Spent (min): 5

## 2023-04-20 ENCOUNTER — OFFICE VISIT (OUTPATIENT)
Dept: FAMILY MEDICINE CLINIC | Age: 48
End: 2023-04-20
Payer: COMMERCIAL

## 2023-04-20 VITALS
DIASTOLIC BLOOD PRESSURE: 83 MMHG | TEMPERATURE: 98.2 F | OXYGEN SATURATION: 99 % | WEIGHT: 192.4 LBS | HEART RATE: 82 BPM | BODY MASS INDEX: 26.94 KG/M2 | SYSTOLIC BLOOD PRESSURE: 122 MMHG | RESPIRATION RATE: 16 BRPM | HEIGHT: 71 IN

## 2023-04-20 DIAGNOSIS — I10 ESSENTIAL HYPERTENSION: ICD-10-CM

## 2023-04-20 DIAGNOSIS — E78.5 DYSLIPIDEMIA, GOAL LDL BELOW 100: ICD-10-CM

## 2023-04-20 DIAGNOSIS — E11.65 UNCONTROLLED TYPE 2 DIABETES MELLITUS WITH HYPERGLYCEMIA, WITHOUT LONG-TERM CURRENT USE OF INSULIN (HCC): Primary | ICD-10-CM

## 2023-04-20 LAB
ALBUMIN SERPL-MCNC: 3.9 G/DL (ref 3.5–5)
ALBUMIN/GLOB SERPL: 1 (ref 1.1–2.2)
ALP SERPL-CCNC: 57 U/L (ref 45–117)
ALT SERPL-CCNC: 27 U/L (ref 12–78)
ANION GAP SERPL CALC-SCNC: 5 MMOL/L (ref 5–15)
AST SERPL-CCNC: 12 U/L (ref 15–37)
BILIRUB SERPL-MCNC: 0.9 MG/DL (ref 0.2–1)
BUN SERPL-MCNC: 13 MG/DL (ref 6–20)
BUN/CREAT SERPL: 16 (ref 12–20)
CALCIUM SERPL-MCNC: 8.9 MG/DL (ref 8.5–10.1)
CHLORIDE SERPL-SCNC: 101 MMOL/L (ref 97–108)
CHOLEST SERPL-MCNC: 156 MG/DL
CO2 SERPL-SCNC: 29 MMOL/L (ref 21–32)
CREAT SERPL-MCNC: 0.81 MG/DL (ref 0.7–1.3)
EST. AVERAGE GLUCOSE BLD GHB EST-MCNC: 154 MG/DL
GLOBULIN SER CALC-MCNC: 4 G/DL (ref 2–4)
GLUCOSE SERPL-MCNC: 116 MG/DL (ref 65–100)
HBA1C MFR BLD: 7 % (ref 4–5.6)
HDLC SERPL-MCNC: 49 MG/DL
HDLC SERPL: 3.2 (ref 0–5)
LDLC SERPL CALC-MCNC: 88.6 MG/DL (ref 0–100)
POTASSIUM SERPL-SCNC: 4.3 MMOL/L (ref 3.5–5.1)
PROT SERPL-MCNC: 7.9 G/DL (ref 6.4–8.2)
SODIUM SERPL-SCNC: 135 MMOL/L (ref 136–145)
TRIGL SERPL-MCNC: 92 MG/DL (ref ?–150)
VLDLC SERPL CALC-MCNC: 18.4 MG/DL

## 2023-04-20 PROCEDURE — 99214 OFFICE O/P EST MOD 30 MIN: CPT | Performed by: FAMILY MEDICINE

## 2023-04-20 PROCEDURE — 3079F DIAST BP 80-89 MM HG: CPT | Performed by: FAMILY MEDICINE

## 2023-04-20 PROCEDURE — 3074F SYST BP LT 130 MM HG: CPT | Performed by: FAMILY MEDICINE

## 2023-04-20 PROCEDURE — 3046F HEMOGLOBIN A1C LEVEL >9.0%: CPT | Performed by: FAMILY MEDICINE

## 2023-04-20 NOTE — PROGRESS NOTES
HISTORY OF PRESENT ILLNESS  Buster Lopez is a 52 y.o. male. Patient was seen before follow-up on diabetes, dyslipidemia, hypertension family history of early CAD. HPI  Endocrine Review  He is seen for diabetes. Since last visit he reports: no polyuria or polydipsia, no chest pain, dyspnea or TIA's, no numbness, tingling or pain in extremities, no unusual visual symptoms, no hypoglycemia, no medication side effects noted, weight has decreased, no significant changes. Testing: is not performed. He reports medication compliance: taking Metformin one time only. Medication side effects: none. Diabetic diet compliance: compliant all of the time. Lab review: Labs reviewed with patient. Patient on metformin 1 g twice daily as well as recently he was started [facility] injection with significant improvement in sugar. His glimepiride was stopped during that time. This year his insurance is not covering for [facility] and he is not able to use coupons for that medication also. He has been changed to Trulicity and now he is taking Trulicity 1.5 mg, and metformin     Cardiovascular Review  The patient has hypertension, hyperlipidemia and Family h/o CAD. He reports taking medications as instructed, no medication side effects noted, patient does not perform home BP monitoring, no chest pain on exertion, no dyspnea on exertion, no orthostatic dizziness or lightheadedness, no orthopnea or paroxysmal nocturnal dyspnea, no palpitations, no intermittent claudication symptoms. Diet and Lifestyle: generally follows a low fat low cholesterol diet, generally follows a low sodium diet, exercises regularly, nonsmoker. Lab review: no lab studies available for review at time of visit. Medicines: Lisinopril 10 mg and atorvastatin 10 mg        Review of Systems   Constitutional:  Negative for chills, fever and malaise/fatigue. HENT:  Negative for congestion, ear pain, sore throat and tinnitus.     Eyes:  Negative for blurred vision, double vision, pain and discharge. Respiratory:  Negative for cough, shortness of breath and wheezing. Cardiovascular:  Negative for chest pain, palpitations and leg swelling. Gastrointestinal:  Negative for abdominal pain, blood in stool, constipation, diarrhea, nausea and vomiting. Genitourinary:  Negative for dysuria, frequency, hematuria and urgency. Musculoskeletal:  Negative for back pain, joint pain and myalgias. Skin:  Negative for rash. Neurological:  Negative for dizziness, tremors, seizures and headaches. Endo/Heme/Allergies:  Negative for polydipsia. Does not bruise/bleed easily. Psychiatric/Behavioral:  Negative for depression and substance abuse. The patient is not nervous/anxious. Physical Exam  Vitals and nursing note reviewed. Constitutional:       Appearance: He is well-developed. He is not diaphoretic. HENT:      Head: Normocephalic and atraumatic. Right Ear: External ear normal.      Mouth/Throat:      Mouth: Mucous membranes are moist.      Pharynx: No oropharyngeal exudate. Eyes:      General: No scleral icterus. Conjunctiva/sclera: Conjunctivae normal.      Pupils: Pupils are equal, round, and reactive to light. Neck:      Thyroid: No thyromegaly. Vascular: No JVD. Cardiovascular:      Rate and Rhythm: Normal rate and regular rhythm. Heart sounds: Normal heart sounds. No murmur heard. Pulmonary:      Effort: Pulmonary effort is normal.      Breath sounds: Normal breath sounds. No wheezing. Abdominal:      General: Bowel sounds are normal. There is no distension. Palpations: Abdomen is soft. There is no mass. Musculoskeletal:         General: No tenderness. Normal range of motion. Cervical back: Normal range of motion and neck supple. Lymphadenopathy:      Cervical: No cervical adenopathy. Skin:     General: Skin is warm and dry. Findings: No rash.    Neurological:      Mental Status: He is alert and oriented to person, place, and time. Cranial Nerves: No cranial nerve deficit. Deep Tendon Reflexes: Reflexes are normal and symmetric. Reflexes normal.   Psychiatric:         Mood and Affect: Mood normal.         Behavior: Behavior normal.       ASSESSMENT and PLAN  Diagnoses and all orders for this visit:    1. Uncontrolled type 2 diabetes mellitus with hyperglycemia, without long-term current use of insulin ([facility] [address]  Assessment & Plan:   uncontrolled, continue current medications pending work up below, medication adherence emphasized, lifestyle modifications recommended    Orders:  -     LIPID PANEL; Future  -     METABOLIC PANEL, COMPREHENSIVE; Future  -     HEMOGLOBIN A1C WITH EAG; Future    2. Essential hypertension  -     METABOLIC PANEL, COMPREHENSIVE; Future    3. Dyslipidemia, goal LDL below 100  -     LIPID PANEL; Future  -     METABOLIC PANEL, COMPREHENSIVE; Future    Discussed lifestyle issues and health guidance given  Patient was given an after visit summary which includes diagnoses, vital signs, current medications, instructions and references & authorized prescriptions . Results of labs will be conveyed to patient, once available. Pt verbalized instructions I provided and expressed understanding of discussion that was held today. Follow-up and Dispositions    Return in about 3 months (around 7/20/2023). Please note that this dictation was completed with Meetapp, the [employer] voice recognition software. Quite often unanticipated grammatical, syntax, homophones, and other interpretive errors are inadvertently transcribed by the computer software. Please disregard these errors. Please excuse any errors that have escaped final proofreading. Thank you.

## 2023-04-20 NOTE — PROGRESS NOTES
Chief Complaint   Patient presents with    Diabetes     Follow up         1. \"Have you been to the ER, urgent care clinic since your last visit? Hospitalized since your last visit? \" No    2. \"Have you seen or consulted any other health care providers outside of the [address] [place] [address] since your last visit? \" No     3. For patients aged 39-70: Has the patient had a colonoscopy / FIT/ Cologuard? Yes - no Care Gap present      If the patient is female:    4. For patients aged 41-77: Has the patient had a mammogram within the past 2 years? NA - based on age or sex      11. For patients aged 21-65: Has the patient had a pap smear?  NA - based on age or sex       Financial Resource Strain: Low Risk     Difficulty of Paying Living Expenses: Not hard at all      Food Insecurity: No Food Insecurity    Worried About [address] in the Last Year: Never true    [facility] in the Last Year: Never true        3 most recent PHQ Screens 4/20/2023   Little interest or pleasure in doing things Not at all   Feeling down, depressed, irritable, or hopeless Not at all   Total Score PHQ 2 0       Health Maintenance Due   Topic Date Due    Hepatitis B Vaccine (1 of 3 - Risk 3-dose series) Never done    COVID-19 Vaccine (3 - Booster for Pfizer series) 07/08/2021    A1C test (Diabetic or Prediabetic)  04/18/2023

## 2023-04-21 NOTE — PROGRESS NOTES
Ash Stoddard,  Significant improvement in fasting and average sugar with Trulicity. Cholesterol results, kidney and liver function are also normal.  Continue with current doses and medications and focus more on diet and exercise so we can taper glimepiride later on. Let me know if you have any questions, thanks.

## 2023-04-29 DIAGNOSIS — E11.9 CONTROLLED TYPE 2 DIABETES MELLITUS WITHOUT COMPLICATION, WITHOUT LONG-TERM CURRENT USE OF INSULIN (HCC): ICD-10-CM

## 2023-04-29 RX ORDER — GLIMEPIRIDE 2 MG/1
TABLET ORAL
Qty: 180 TABLET | Refills: 1 | Status: SHIPPED | OUTPATIENT
Start: 2023-04-29

## 2023-05-25 NOTE — TELEPHONE ENCOUNTER
[facility] requesting new rx for patient. Change in pharmacy.   Gaby Cowan    Last appointment: 4/20/23  MD Miguel A Sanchez  Next appointment: 7/7/23 Miguel A Sanchez  Previous refill encounter(s): 4/4/23 [facility]    Requested Prescriptions     Pending Prescriptions Disp Refills    dulaglutide (TRULICITY) 1.5 AP/6.7OB SC injection 6 mL 1     Sig: Inject 0.5 mLs into the skin every 7 days     For Pharmacy Admin Tracking Only    Program: Medication Refill  Intervention Detail: New Rx: 1, reason: Patient Preference  Time Spent (min): 5

## 2023-07-07 ENCOUNTER — OFFICE VISIT (OUTPATIENT)
Age: 48
End: 2023-07-07
Payer: COMMERCIAL

## 2023-07-07 VITALS
HEART RATE: 89 BPM | OXYGEN SATURATION: 98 % | RESPIRATION RATE: 16 BRPM | WEIGHT: 188 LBS | HEIGHT: 71 IN | BODY MASS INDEX: 26.32 KG/M2 | DIASTOLIC BLOOD PRESSURE: 76 MMHG | TEMPERATURE: 98 F | SYSTOLIC BLOOD PRESSURE: 132 MMHG

## 2023-07-07 DIAGNOSIS — E78.5 DYSLIPIDEMIA, GOAL LDL BELOW 100: ICD-10-CM

## 2023-07-07 DIAGNOSIS — Z11.4 SCREENING FOR HIV WITHOUT PRESENCE OF RISK FACTORS: ICD-10-CM

## 2023-07-07 DIAGNOSIS — E11.65 TYPE 2 DIABETES MELLITUS WITH HYPERGLYCEMIA, WITHOUT LONG-TERM CURRENT USE OF INSULIN (HCC): Primary | ICD-10-CM

## 2023-07-07 DIAGNOSIS — I10 ESSENTIAL (PRIMARY) HYPERTENSION: ICD-10-CM

## 2023-07-07 LAB
ALBUMIN SERPL-MCNC: 4.1 G/DL (ref 3.5–5)
ALBUMIN/GLOB SERPL: 1.1 (ref 1.1–2.2)
ALP SERPL-CCNC: 58 U/L (ref 45–117)
ALT SERPL-CCNC: 20 U/L (ref 12–78)
ANION GAP SERPL CALC-SCNC: 3 MMOL/L (ref 5–15)
AST SERPL-CCNC: 11 U/L (ref 15–37)
BILIRUB SERPL-MCNC: 0.7 MG/DL (ref 0.2–1)
BUN SERPL-MCNC: 14 MG/DL (ref 6–20)
BUN/CREAT SERPL: 20 (ref 12–20)
CALCIUM SERPL-MCNC: 9 MG/DL (ref 8.5–10.1)
CHLORIDE SERPL-SCNC: 103 MMOL/L (ref 97–108)
CHOLEST SERPL-MCNC: 173 MG/DL
CO2 SERPL-SCNC: 29 MMOL/L (ref 21–32)
CREAT SERPL-MCNC: 0.7 MG/DL (ref 0.7–1.3)
CREAT UR-MCNC: 149 MG/DL
EST. AVERAGE GLUCOSE BLD GHB EST-MCNC: 163 MG/DL
GLOBULIN SER CALC-MCNC: 3.8 G/DL (ref 2–4)
GLUCOSE SERPL-MCNC: 154 MG/DL (ref 65–100)
HBA1C MFR BLD: 7.3 % (ref 4–5.6)
HDLC SERPL-MCNC: 44 MG/DL
HDLC SERPL: 3.9 (ref 0–5)
HIV 1+2 AB+HIV1 P24 AG SERPL QL IA: NONREACTIVE
HIV 1/2 RESULT COMMENT: NORMAL
LDLC SERPL CALC-MCNC: 103.2 MG/DL (ref 0–100)
MICROALBUMIN UR-MCNC: 2.84 MG/DL
MICROALBUMIN/CREAT UR-RTO: 19 MG/G (ref 0–30)
POTASSIUM SERPL-SCNC: 4.2 MMOL/L (ref 3.5–5.1)
PROT SERPL-MCNC: 7.9 G/DL (ref 6.4–8.2)
SODIUM SERPL-SCNC: 135 MMOL/L (ref 136–145)
TRIGL SERPL-MCNC: 129 MG/DL
VLDLC SERPL CALC-MCNC: 25.8 MG/DL

## 2023-07-07 PROCEDURE — 1036F TOBACCO NON-USER: CPT | Performed by: FAMILY MEDICINE

## 2023-07-07 PROCEDURE — G8427 DOCREV CUR MEDS BY ELIG CLIN: HCPCS | Performed by: FAMILY MEDICINE

## 2023-07-07 PROCEDURE — 3078F DIAST BP <80 MM HG: CPT | Performed by: FAMILY MEDICINE

## 2023-07-07 PROCEDURE — 99214 OFFICE O/P EST MOD 30 MIN: CPT | Performed by: FAMILY MEDICINE

## 2023-07-07 PROCEDURE — 2022F DILAT RTA XM EVC RTNOPTHY: CPT | Performed by: FAMILY MEDICINE

## 2023-07-07 PROCEDURE — 3051F HG A1C>EQUAL 7.0%<8.0%: CPT | Performed by: FAMILY MEDICINE

## 2023-07-07 PROCEDURE — G8419 CALC BMI OUT NRM PARAM NOF/U: HCPCS | Performed by: FAMILY MEDICINE

## 2023-07-07 PROCEDURE — 3075F SYST BP GE 130 - 139MM HG: CPT | Performed by: FAMILY MEDICINE

## 2023-07-07 SDOH — ECONOMIC STABILITY: HOUSING INSECURITY
IN THE LAST 12 MONTHS, WAS THERE A TIME WHEN YOU DID NOT HAVE A STEADY PLACE TO SLEEP OR SLEPT IN A SHELTER (INCLUDING NOW)?: NO

## 2023-07-07 SDOH — ECONOMIC STABILITY: FOOD INSECURITY: WITHIN THE PAST 12 MONTHS, THE FOOD YOU BOUGHT JUST DIDN'T LAST AND YOU DIDN'T HAVE MONEY TO GET MORE.: NEVER TRUE

## 2023-07-07 SDOH — ECONOMIC STABILITY: INCOME INSECURITY: HOW HARD IS IT FOR YOU TO PAY FOR THE VERY BASICS LIKE FOOD, HOUSING, MEDICAL CARE, AND HEATING?: NOT HARD AT ALL

## 2023-07-07 SDOH — ECONOMIC STABILITY: FOOD INSECURITY: WITHIN THE PAST 12 MONTHS, YOU WORRIED THAT YOUR FOOD WOULD RUN OUT BEFORE YOU GOT MONEY TO BUY MORE.: NEVER TRUE

## 2023-07-07 ASSESSMENT — ENCOUNTER SYMPTOMS
ABDOMINAL PAIN: 0
BACK PAIN: 0
WHEEZING: 0
CONSTIPATION: 0
COUGH: 0
SORE THROAT: 0
SHORTNESS OF BREATH: 0
DIARRHEA: 0
NAUSEA: 0

## 2023-07-07 NOTE — PROGRESS NOTES
Chief Complaint   Patient presents with    Diabetes     Follow up         1. \"Have you been to the ER, urgent care clinic since your last visit? Hospitalized since your last visit? \"     no       2. \"Have you seen or consulted any other health care providers outside of the [address] [place] [address] since your last visit? \"       no     3. For patients aged 43-73: Has the patient had a colonoscopy / FIT/ Cologuard? N/A      If the patient is female: 4. For patients aged 43-66: Has the patient had a mammogram within the past 2 years? NA - based on age or sex      11. For patients aged 21-65: Has the patient had a pap smear?  NA - based on age or sex      PHQ-9  4/20/2023   Little interest or pleasure in doing things 0   Little interest or pleasure in doing things -   Feeling down, depressed, or hopeless 0   PHQ-2 Score 0   Total Score PHQ 2 -   PHQ-9 Total Score 0           Financial Resource Strain: Low Risk     Difficulty of Paying Living Expenses: Not hard at all      Food Insecurity: No Food Insecurity    Worried About Running Out of Food in the Last Year: Never true    Ran Out of Food in the Last Year: Never true          Health Maintenance Due   Topic Date Due    HIV screen  Never done    Hepatitis B vaccine (1 of 3 - Risk 3-dose series) Never done    DTaP/Tdap/Td vaccine (1 - Tdap) Never done    COVID-19 Vaccine (3 - Booster for Pfizer series) 07/08/2021    Diabetic retinal exam  06/04/2022
Behavior: Behavior normal.       Labs/Imaging/Diagnostics   Labs:  Lab Results   Component Value Date    WBC 5.0 11/30/2020    HGB 15.1 11/30/2020    HCT 46.4 11/30/2020    MCV 88.7 11/30/2020     11/30/2020     Lab Results   Component Value Date     (L) 04/20/2023    K 4.3 04/20/2023     04/20/2023    CO2 29 04/20/2023    BUN 13 04/20/2023    CREATININE 0.81 04/20/2023    GLUCOSE 116 (H) 04/20/2023    CALCIUM 8.9 04/20/2023    PROT 7.9 04/20/2023    LABALBU 3.9 04/20/2023    BILITOT 0.9 04/20/2023    ALKPHOS 57 04/20/2023    AST 12 (L) 04/20/2023    ALT 27 04/20/2023    GFRAA >60 07/21/2022    AGRATIO 1.0 (L) 04/20/2023    GLOB 4.0 04/20/2023     Hemoglobin A1C   Date Value Ref Range Status   04/20/2023 7.0 (H) 4.0 - 5.6 % Final     Comment:     NEW METHOD  PLEASE NOTE NEW REFERENCE RANGE  (NOTE)  HbA1C Interpretive Ranges  <5.7              Normal  5.7 - 6.4         Consider Prediabetes  >6.5              Consider Diabetes            Assessment & Plan      1. Type 2 diabetes mellitus with hyperglycemia, without long-term current use of insulin (HCC)  Assessment & Plan:   Well-controlled, continue current medications, continue current treatment plan, medication adherence emphasized and lifestyle modifications recommended  Orders:  -     External Referral To Ophthalmology  -     Comprehensive Metabolic Panel; Future  -      DIABETES FOOT EXAM  -     Hemoglobin A1C; Future  -     Microalbumin / Creatinine Urine Ratio; Future  -     Lipid Panel; Future  2. Essential (primary) hypertension  -     Comprehensive Metabolic Panel; Future  3. Dyslipidemia, goal LDL below 100  -     Comprehensive Metabolic Panel; Future  -     Lipid Panel; Future  4. Screening for HIV without presence of risk factors  -     HIV 1/2 Ag/Ab, 4TH Generation,W Rflx Confirm;  Future       Discussed lifestyle issues and health guidance given  Patient was given an after visit summary which includes diagnoses, vital signs, current

## 2023-07-18 ENCOUNTER — PATIENT MESSAGE (OUTPATIENT)
Age: 48
End: 2023-07-18

## 2023-07-18 NOTE — TELEPHONE ENCOUNTER
Pharmacy Progress Note     Pt's most recent A1c was slightly elevated and fasting BG rdg was elevated. Sent Liquid[employer] message to pt offering to schedule PharmD f/up.         Talha Holt, PharmD, [facility]  Clinical Pharmacist Specialist      For Pharmacy Admin Tracking Only    Program: Medical Group  CPA in place:  Yes  Recommendation Provided To: Patient/Caregiver: 1 via Liquid[employer] Message  Intervention Detail: Scheduled Appointment  Intervention Accepted By: Patient/Caregiver: 1  Time Spent (min): 5

## 2023-08-22 DIAGNOSIS — I10 ESSENTIAL (PRIMARY) HYPERTENSION: ICD-10-CM

## 2023-08-22 RX ORDER — LISINOPRIL 10 MG/1
TABLET ORAL
Qty: 90 TABLET | Refills: 1 | Status: SHIPPED | OUTPATIENT
Start: 2023-08-22

## 2023-10-12 ENCOUNTER — OFFICE VISIT (OUTPATIENT)
Age: 48
End: 2023-10-12
Payer: COMMERCIAL

## 2023-10-12 VITALS
SYSTOLIC BLOOD PRESSURE: 124 MMHG | WEIGHT: 188.4 LBS | HEART RATE: 80 BPM | TEMPERATURE: 97.7 F | DIASTOLIC BLOOD PRESSURE: 78 MMHG | HEIGHT: 71 IN | RESPIRATION RATE: 16 BRPM | BODY MASS INDEX: 26.38 KG/M2 | OXYGEN SATURATION: 98 %

## 2023-10-12 DIAGNOSIS — I10 ESSENTIAL (PRIMARY) HYPERTENSION: ICD-10-CM

## 2023-10-12 DIAGNOSIS — E11.65 TYPE 2 DIABETES MELLITUS WITH HYPERGLYCEMIA, WITHOUT LONG-TERM CURRENT USE OF INSULIN (HCC): Primary | ICD-10-CM

## 2023-10-12 DIAGNOSIS — N52.9 ERECTILE DYSFUNCTION, UNSPECIFIED ERECTILE DYSFUNCTION TYPE: ICD-10-CM

## 2023-10-12 DIAGNOSIS — Z23 NEEDS FLU SHOT: ICD-10-CM

## 2023-10-12 DIAGNOSIS — L81.0 POST-INFLAMMATORY HYPERPIGMENTATION: ICD-10-CM

## 2023-10-12 DIAGNOSIS — E78.5 DYSLIPIDEMIA, GOAL LDL BELOW 100: ICD-10-CM

## 2023-10-12 DIAGNOSIS — Z82.49 FAMILY HISTORY OF EARLY CAD: ICD-10-CM

## 2023-10-12 LAB
ALBUMIN SERPL-MCNC: 4.1 G/DL (ref 3.5–5)
ALBUMIN/GLOB SERPL: 1 (ref 1.1–2.2)
ALP SERPL-CCNC: 67 U/L (ref 45–117)
ALT SERPL-CCNC: 23 U/L (ref 12–78)
ANION GAP SERPL CALC-SCNC: 6 MMOL/L (ref 5–15)
AST SERPL-CCNC: 13 U/L (ref 15–37)
BILIRUB SERPL-MCNC: 0.8 MG/DL (ref 0.2–1)
BUN SERPL-MCNC: 17 MG/DL (ref 6–20)
BUN/CREAT SERPL: 19 (ref 12–20)
CALCIUM SERPL-MCNC: 9 MG/DL (ref 8.5–10.1)
CHLORIDE SERPL-SCNC: 101 MMOL/L (ref 97–108)
CHOLEST SERPL-MCNC: 176 MG/DL
CO2 SERPL-SCNC: 30 MMOL/L (ref 21–32)
CREAT SERPL-MCNC: 0.88 MG/DL (ref 0.7–1.3)
CREAT UR-MCNC: 249 MG/DL
EST. AVERAGE GLUCOSE BLD GHB EST-MCNC: 163 MG/DL
GLOBULIN SER CALC-MCNC: 4 G/DL (ref 2–4)
GLUCOSE SERPL-MCNC: 190 MG/DL (ref 65–100)
HBA1C MFR BLD: 7.3 % (ref 4–5.6)
HDLC SERPL-MCNC: 47 MG/DL
HDLC SERPL: 3.7 (ref 0–5)
LDLC SERPL CALC-MCNC: 101.2 MG/DL (ref 0–100)
MICROALBUMIN UR-MCNC: 4.38 MG/DL
MICROALBUMIN/CREAT UR-RTO: 18 MG/G (ref 0–30)
POTASSIUM SERPL-SCNC: 4.1 MMOL/L (ref 3.5–5.1)
PROT SERPL-MCNC: 8.1 G/DL (ref 6.4–8.2)
SODIUM SERPL-SCNC: 137 MMOL/L (ref 136–145)
TRIGL SERPL-MCNC: 139 MG/DL
VLDLC SERPL CALC-MCNC: 27.8 MG/DL

## 2023-10-12 PROCEDURE — 2022F DILAT RTA XM EVC RTNOPTHY: CPT | Performed by: FAMILY MEDICINE

## 2023-10-12 PROCEDURE — 3074F SYST BP LT 130 MM HG: CPT | Performed by: FAMILY MEDICINE

## 2023-10-12 PROCEDURE — 90471 IMMUNIZATION ADMIN: CPT | Performed by: FAMILY MEDICINE

## 2023-10-12 PROCEDURE — 3051F HG A1C>EQUAL 7.0%<8.0%: CPT | Performed by: FAMILY MEDICINE

## 2023-10-12 PROCEDURE — G8427 DOCREV CUR MEDS BY ELIG CLIN: HCPCS | Performed by: FAMILY MEDICINE

## 2023-10-12 PROCEDURE — 90674 CCIIV4 VAC NO PRSV 0.5 ML IM: CPT | Performed by: FAMILY MEDICINE

## 2023-10-12 PROCEDURE — 1036F TOBACCO NON-USER: CPT | Performed by: FAMILY MEDICINE

## 2023-10-12 PROCEDURE — G8482 FLU IMMUNIZE ORDER/ADMIN: HCPCS | Performed by: FAMILY MEDICINE

## 2023-10-12 PROCEDURE — 3078F DIAST BP <80 MM HG: CPT | Performed by: FAMILY MEDICINE

## 2023-10-12 PROCEDURE — G8419 CALC BMI OUT NRM PARAM NOF/U: HCPCS | Performed by: FAMILY MEDICINE

## 2023-10-12 PROCEDURE — 99214 OFFICE O/P EST MOD 30 MIN: CPT | Performed by: FAMILY MEDICINE

## 2023-10-12 RX ORDER — TRIAMCINOLONE ACETONIDE 1 MG/G
CREAM TOPICAL
Qty: 15 G | Refills: 1 | Status: SHIPPED | OUTPATIENT
Start: 2023-10-12

## 2023-10-12 SDOH — ECONOMIC STABILITY: FOOD INSECURITY: WITHIN THE PAST 12 MONTHS, THE FOOD YOU BOUGHT JUST DIDN'T LAST AND YOU DIDN'T HAVE MONEY TO GET MORE.: NEVER TRUE

## 2023-10-12 SDOH — ECONOMIC STABILITY: FOOD INSECURITY: WITHIN THE PAST 12 MONTHS, YOU WORRIED THAT YOUR FOOD WOULD RUN OUT BEFORE YOU GOT MONEY TO BUY MORE.: NEVER TRUE

## 2023-10-12 SDOH — ECONOMIC STABILITY: INCOME INSECURITY: HOW HARD IS IT FOR YOU TO PAY FOR THE VERY BASICS LIKE FOOD, HOUSING, MEDICAL CARE, AND HEATING?: NOT HARD AT ALL

## 2023-10-12 ASSESSMENT — PATIENT HEALTH QUESTIONNAIRE - PHQ9
SUM OF ALL RESPONSES TO PHQ QUESTIONS 1-9: 0
SUM OF ALL RESPONSES TO PHQ9 QUESTIONS 1 & 2: 0
SUM OF ALL RESPONSES TO PHQ QUESTIONS 1-9: 0
1. LITTLE INTEREST OR PLEASURE IN DOING THINGS: 0
2. FEELING DOWN, DEPRESSED OR HOPELESS: 0

## 2023-10-12 ASSESSMENT — ENCOUNTER SYMPTOMS
SORE THROAT: 0
NAUSEA: 0
SHORTNESS OF BREATH: 0
ABDOMINAL PAIN: 0
COUGH: 0
CONSTIPATION: 0
BACK PAIN: 0
WHEEZING: 0
DIARRHEA: 0

## 2023-10-12 NOTE — PROGRESS NOTES
Date:10/12/2023        Patient Kavitha Pickard     YOB: 1975     Age:48 y.o. Seen today for   Chief Complaint   Patient presents with    Diabetes     Follow up   He is traveling to [facility], end of this month. Today he is concerned about low energy, fatigue and wants to check his testosterone level. HPI   Endocrine Review  He is seen for diabetes. Since last visit he reports: no polyuria or polydipsia, no chest pain, dyspnea or TIA's, no numbness, tingling or pain in extremities, no unusual visual symptoms, no hypoglycemia, no medication side effects noted, weight has decreased, no significant changes. Testing: is not performed. He reports medication compliance: taking Metformin one time only. Medication side effects: none. Diabetic diet compliance: compliant all of the time. Lab review: Labs reviewed with patient. Patient on metformin 1 g twice daily as well as recently he was started [facility] injection with significant improvement in sugar. His glimepiride was stopped during that time. This year his insurance is not covering for [facility] and he is not able to use coupons for that medication also. He has been changed to Trulicity and now he is taking Trulicity 1.5 mg, glimepiride 2 mg and and metformin 1 g twice daily     Cardiovascular Review  The patient has hypertension, hyperlipidemia and Family h/o CAD. He reports taking medications as instructed, no medication side effects noted, patient does not perform home BP monitoring, no chest pain on exertion, no dyspnea on exertion, no orthostatic dizziness or lightheadedness, no orthopnea or paroxysmal nocturnal dyspnea, no palpitations, no intermittent claudication symptoms. Diet and Lifestyle: generally follows a low fat low cholesterol diet, generally follows a low sodium diet, exercises regularly, nonsmoker. Lab review: no lab studies available for review at time of visit.     Medicines: Lisinopril 10 mg and atorvastatin 10

## 2023-10-15 LAB — TESTOST SERPL-MCNC: 419 NG/DL (ref 264–916)

## 2023-10-19 LAB
TESTOST FREE SERPL-MCNC: 9.7 PG/ML (ref 6.8–21.5)
TESTOST SERPL-MCNC: 419 NG/DL (ref 264–916)

## 2023-11-13 NOTE — TELEPHONE ENCOUNTER
We are receiving a refill request from [facility] for a 90 day supply per insurance. (Had been using [facility] but noted [facility]). Thanks, Abraham Mooney    Last appointment: 10/12/23 Renata Mccarthy  Next appointment: 2/22/24 MD Renata Mccarthy  Previous refill encounter(s): 5/25/23 6ml + 1    Requested Prescriptions     Pending Prescriptions Disp Refills    dulaglutide (TRULICITY) 1.5 BD/3.3PW SC injection 6 mL 1     Sig: Inject 0.5 mLs into the skin every 7 days     For Pharmacy Admin Tracking Only    Program: Medication Refill  CPA in place:    Recommendation Provided To:    Intervention Detail: New Rx: 1, reason: Patient Preference  Intervention Accepted By:   [place] Closed?:    Time Spent (min): 5

## 2024-01-13 DIAGNOSIS — E11.9 TYPE 2 DIABETES MELLITUS WITHOUT COMPLICATIONS (HCC): ICD-10-CM

## 2024-02-22 ENCOUNTER — OFFICE VISIT (OUTPATIENT)
Age: 49
End: 2024-02-22
Payer: COMMERCIAL

## 2024-02-22 VITALS
TEMPERATURE: 97.8 F | OXYGEN SATURATION: 100 % | WEIGHT: 193 LBS | SYSTOLIC BLOOD PRESSURE: 111 MMHG | DIASTOLIC BLOOD PRESSURE: 73 MMHG | RESPIRATION RATE: 14 BRPM | BODY MASS INDEX: 27.02 KG/M2 | HEIGHT: 71 IN | HEART RATE: 80 BPM

## 2024-02-22 DIAGNOSIS — E11.65 TYPE 2 DIABETES MELLITUS WITH HYPERGLYCEMIA, WITHOUT LONG-TERM CURRENT USE OF INSULIN (HCC): Primary | ICD-10-CM

## 2024-02-22 DIAGNOSIS — Z23 NEED FOR DIPHTHERIA-TETANUS-PERTUSSIS (TDAP) VACCINE: ICD-10-CM

## 2024-02-22 DIAGNOSIS — E78.5 DYSLIPIDEMIA, GOAL LDL BELOW 100: ICD-10-CM

## 2024-02-22 DIAGNOSIS — Z82.49 FAMILY HISTORY OF EARLY CAD: ICD-10-CM

## 2024-02-22 PROBLEM — G57.11 MERALGIA PARESTHETICA OF RIGHT SIDE: Status: RESOLVED | Noted: 2019-11-25 | Resolved: 2024-02-22

## 2024-02-22 PROCEDURE — 90715 TDAP VACCINE 7 YRS/> IM: CPT | Performed by: FAMILY MEDICINE

## 2024-02-22 PROCEDURE — 2022F DILAT RTA XM EVC RTNOPTHY: CPT | Performed by: FAMILY MEDICINE

## 2024-02-22 PROCEDURE — 3046F HEMOGLOBIN A1C LEVEL >9.0%: CPT | Performed by: FAMILY MEDICINE

## 2024-02-22 PROCEDURE — G8419 CALC BMI OUT NRM PARAM NOF/U: HCPCS | Performed by: FAMILY MEDICINE

## 2024-02-22 PROCEDURE — G8427 DOCREV CUR MEDS BY ELIG CLIN: HCPCS | Performed by: FAMILY MEDICINE

## 2024-02-22 PROCEDURE — 1036F TOBACCO NON-USER: CPT | Performed by: FAMILY MEDICINE

## 2024-02-22 PROCEDURE — 90471 IMMUNIZATION ADMIN: CPT | Performed by: FAMILY MEDICINE

## 2024-02-22 PROCEDURE — 99213 OFFICE O/P EST LOW 20 MIN: CPT | Performed by: FAMILY MEDICINE

## 2024-02-22 PROCEDURE — G8482 FLU IMMUNIZE ORDER/ADMIN: HCPCS | Performed by: FAMILY MEDICINE

## 2024-02-22 ASSESSMENT — ENCOUNTER SYMPTOMS
BACK PAIN: 0
COUGH: 0
CONSTIPATION: 0
DIARRHEA: 0
ABDOMINAL PAIN: 0
NAUSEA: 0
SORE THROAT: 0
SHORTNESS OF BREATH: 0
WHEEZING: 0

## 2024-02-22 ASSESSMENT — PATIENT HEALTH QUESTIONNAIRE - PHQ9
SUM OF ALL RESPONSES TO PHQ QUESTIONS 1-9: 0
1. LITTLE INTEREST OR PLEASURE IN DOING THINGS: 0
SUM OF ALL RESPONSES TO PHQ QUESTIONS 1-9: 0
2. FEELING DOWN, DEPRESSED OR HOPELESS: 0

## 2024-02-22 NOTE — PROGRESS NOTES
Chief Complaint   Patient presents with    Diabetes     Follow up     \"Have you been to the ER, urgent care clinic since your last visit?  Hospitalized since your last visit?\"    NO    “Have you seen or consulted any other health care providers outside of [facility] since your last visit?”    NO                 2/22/2024     8:29 AM   PHQ-9    Little interest or pleasure in doing things 0   Feeling down, depressed, or hopeless 0   PHQ-2 Score 0   PHQ-9 Total Score 0           Financial Resource Strain: Low Risk  (10/12/2023)    Overall Financial Resource Strain (CARDIA)     Difficulty of Paying Living Expenses: Not hard at all      Food Insecurity: Not on file (10/12/2023)          Health Maintenance Due   Topic Date Due    Hepatitis B vaccine (1 of 3 - 3-dose series) Never done    DTaP/Tdap/Td vaccine (1 - Tdap) Never done    COVID-19 Vaccine (3 - 2023-24 season) 09/01/2023        
chills, fatigue and fever.   HENT:  Negative for congestion, ear pain and sore throat.    Eyes:  Negative for visual disturbance.   Respiratory:  Negative for cough, shortness of breath and wheezing.    Cardiovascular:  Negative for chest pain and leg swelling.   Gastrointestinal:  Negative for abdominal pain, constipation, diarrhea and nausea.   Genitourinary:  Negative for dysuria.   Musculoskeletal:  Negative for back pain and neck pain.   Skin:  Negative for rash.   Neurological:  Negative for dizziness and headaches.   Psychiatric/Behavioral:  Negative for behavioral problems. The patient is not nervous/anxious.        Medications     Current Outpatient Medications   Medication Sig Dispense Refill    metFORMIN (GLUCOPHAGE) 1000 MG tablet TAKE 1 TABLET BY MOUTH TWICE A DAY WITH MEALS 180 tablet 1    dulaglutide (TRULICITY) 1.5 MG/0.5ML SC injection Inject 0.5 mLs into the skin every 7 days 6 mL 1    lisinopril (PRINIVIL;ZESTRIL) 10 MG tablet TAKE 1 TABLET BY MOUTH EVERY DAY 90 tablet 1    atorvastatin (LIPITOR) 10 MG tablet Take 1 tablet by mouth nightly      glimepiride (AMARYL) 2 MG tablet Take 1 tablet by mouth every morning      triamcinolone (KENALOG) 0.1 % cream Apply topically nightly (Patient not taking: Reported on 2/22/2024) 15 g 1     No current facility-administered medications for this visit.           Past History    Past Medical History:   has a past medical history of Diabetes (HCC) and Hypertension.    Social History:   reports that he has never smoked. He has never used smokeless tobacco. He reports current alcohol use. He reports that he does not use drugs.     Family History:   Family History   Problem Relation Age of Onset    Diabetes Father     Heart Disease Father     Heart Disease Mother        Physical Examination      Vitals:  /73 (Position: Sitting)   Pulse 80   Temp 97.8 °F (36.6 °C) (Temporal)   Resp 14   Ht 1.803 m (5' 11\")   Wt 87.5 kg (193 lb)   SpO2 100%   BMI 26.92

## 2024-02-23 ENCOUNTER — TELEPHONE (OUTPATIENT)
Age: 49
End: 2024-02-23

## 2024-02-23 DIAGNOSIS — Z82.49 FAMILY HISTORY OF ISCHEMIC HEART DISEASE AND OTHER DISEASES OF THE CIRCULATORY SYSTEM: ICD-10-CM

## 2024-02-23 DIAGNOSIS — E78.5 HYPERLIPIDEMIA, UNSPECIFIED: ICD-10-CM

## 2024-02-23 LAB
ALBUMIN SERPL-MCNC: 4.3 G/DL (ref 3.5–5)
ALBUMIN/GLOB SERPL: 1.2 (ref 1.1–2.2)
ALP SERPL-CCNC: 77 U/L (ref 45–117)
ALT SERPL-CCNC: 31 U/L (ref 12–78)
ANION GAP SERPL CALC-SCNC: 4 MMOL/L (ref 5–15)
AST SERPL-CCNC: 13 U/L (ref 15–37)
BILIRUB SERPL-MCNC: 0.8 MG/DL (ref 0.2–1)
BUN SERPL-MCNC: 17 MG/DL (ref 6–20)
BUN/CREAT SERPL: 19 (ref 12–20)
CALCIUM SERPL-MCNC: 8.8 MG/DL (ref 8.5–10.1)
CHLORIDE SERPL-SCNC: 103 MMOL/L (ref 97–108)
CHOLEST SERPL-MCNC: 206 MG/DL
CO2 SERPL-SCNC: 31 MMOL/L (ref 21–32)
CREAT SERPL-MCNC: 0.89 MG/DL (ref 0.7–1.3)
CREAT UR-MCNC: 286 MG/DL
EST. AVERAGE GLUCOSE BLD GHB EST-MCNC: 220 MG/DL
GLOBULIN SER CALC-MCNC: 3.7 G/DL (ref 2–4)
GLUCOSE SERPL-MCNC: 138 MG/DL (ref 65–100)
HBA1C MFR BLD: 9.3 % (ref 4–5.6)
HDLC SERPL-MCNC: 48 MG/DL
HDLC SERPL: 4.3 (ref 0–5)
LDLC SERPL CALC-MCNC: 130.2 MG/DL (ref 0–100)
MICROALBUMIN UR-MCNC: 6.37 MG/DL
MICROALBUMIN/CREAT UR-RTO: 22 MG/G (ref 0–30)
POTASSIUM SERPL-SCNC: 3.6 MMOL/L (ref 3.5–5.1)
PROT SERPL-MCNC: 8 G/DL (ref 6.4–8.2)
SODIUM SERPL-SCNC: 138 MMOL/L (ref 136–145)
TRIGL SERPL-MCNC: 139 MG/DL
VLDLC SERPL CALC-MCNC: 27.8 MG/DL

## 2024-02-23 RX ORDER — ATORVASTATIN CALCIUM 10 MG/1
TABLET, FILM COATED ORAL NIGHTLY
Qty: 90 TABLET | Refills: 1 | Status: SHIPPED | OUTPATIENT
Start: 2024-02-23 | End: 2024-02-24 | Stop reason: SDUPTHER

## 2024-02-23 NOTE — TELEPHONE ENCOUNTER
Pharmacy Progress Note - Telephone Call    Mr. Alonzo Joiner 48 y.o. was contacted via an outbound telephone call regarding discuss A1c lab and scheduling PharmD DM visit today.  A voicemail was left for patient to return my call.  This writer's work mobile number was provided as a callback contact - [phone].      Ria Tapia, PharmD, [facility]  Clinical Pharmacist Specialist        For Pharmacy Admin Tracking Only    Program: Medical Group  CPA in place:  Yes  Recommendation Provided To: Patient/Caregiver: 0 via Telephone  Intervention Accepted By: Patient/Caregiver: 0  Time Spent (min): 5

## 2024-02-23 NOTE — TELEPHONE ENCOUNTER
Pharmacy Progress Note - Telephone Encounter    S/O: Mr. Alonzo Joiner 48 y.o. male, referred by Carmenza Phillips MD, was contacted via an outbound telephone call to discuss scheduling DM visit with PharmD today. Verified patient’s identifiers (name & ) per HIPAA policy.     - Pt amenable to scheduling visit with PharmD    A/P:  - Scheduled on 24 at 9:30AM  - Patient endorses understanding to the provided information. All questions answered at this time.     There are no discontinued medications.  No orders of the defined types were placed in this encounter.       Ria Tapia, PharmD, BCGP, BCACP  Clinical Pharmacist Specialist      For Pharmacy Admin Tracking Only    Program: Medical Group  CPA in place:  Yes  Recommendation Provided To: Patient/Caregiver: 1 via Telephone  Intervention Detail: Scheduled Appointment  Intervention Accepted By: Patient/Caregiver: 1  Time Spent (min): 5

## 2024-02-24 RX ORDER — ATORVASTATIN CALCIUM 10 MG/1
10 TABLET, FILM COATED ORAL NIGHTLY
Qty: 90 TABLET | Refills: 1 | Status: SHIPPED | OUTPATIENT
Start: 2024-02-24

## 2024-02-27 RX ORDER — DULAGLUTIDE 1.5 MG/.5ML
INJECTION, SOLUTION SUBCUTANEOUS
Qty: 6 ML | Refills: 3 | Status: SHIPPED | OUTPATIENT
Start: 2024-02-27

## 2024-02-29 ENCOUNTER — NURSE ONLY (OUTPATIENT)
Age: 49
End: 2024-02-29

## 2024-02-29 ENCOUNTER — PHARMACY VISIT (OUTPATIENT)
Age: 49
End: 2024-02-29

## 2024-02-29 DIAGNOSIS — E11.65 TYPE 2 DIABETES MELLITUS WITH HYPERGLYCEMIA, WITHOUT LONG-TERM CURRENT USE OF INSULIN (HCC): Primary | ICD-10-CM

## 2024-02-29 DIAGNOSIS — R89.9 ABNORMAL LABORATORY TEST: Primary | ICD-10-CM

## 2024-02-29 RX ORDER — GLIMEPIRIDE 2 MG/1
2 TABLET ORAL 2 TIMES DAILY
Qty: 60 TABLET | Refills: 2
Start: 2024-02-29

## 2024-02-29 RX ORDER — GLUCOSAMINE HCL/CHONDROITIN SU 500-400 MG
CAPSULE ORAL
Qty: 100 STRIP | Refills: 3 | Status: SHIPPED | OUTPATIENT
Start: 2024-02-29

## 2024-02-29 NOTE — PROGRESS NOTES
Pharmacy Progress Note - Diabetes Management    S/O: Mr. Alonzo Joiner is a 48 y.o. male, referred by Carmenza Phillips MD, with a PMH of T2DM, over weight, dyslipidemia, was seen today for diabetes management.  Patient's last A1c was 9.3% (Feb 2024).       Interim update: Pt arrives to clinic today to f/up on DM management.  He has not bee checking BG rdgs at home d/t finger pricking pain.  He also does not want CGM - irritation with sensor on arm.  States the last time he checked rdgs at home was approx 2 months ago - before switching from Mounjaro to Trulicity.    Switch to Trulicity has gone smoothly.  Denies adverse effects.      Current anti-hyperglycemic regimen includes:    - Metformin 1000 mg BID  - Glimepiride 2 mg 2 tabs daily - discussed that pt should be taking 1 tab BID  - Trulicity 1.5 mg weekly    ROS:  Today, Pt endorses:  - Symptoms of Hyperglycemia: none  - Symptoms of Hypoglycemia: none    Self Monitoring Blood Glucose (SMBG) or CGM:  - Brought in home glucometer/blood glucose log/CGM reader today:  no  Fasting bg in office today: 138 mg/dL      Vitals:  Wt Readings from Last 3 Encounters:   02/22/24 87.5 kg (193 lb)   10/12/23 85.5 kg (188 lb 6.4 oz)   07/07/23 85.3 kg (188 lb)     BP Readings from Last 3 Encounters:   02/22/24 111/73   10/12/23 124/78   07/07/23 132/76     Pulse Readings from Last 3 Encounters:   02/22/24 80   10/12/23 80   07/07/23 89       Past Medical History:   Diagnosis Date    Diabetes (HCC)     Hypertension      No Known Allergies    Current Outpatient Medications   Medication Sig    TRULICITY 1.5 MG/0.5ML SC injection INJECT THE CONTENTS OF ONE PEN  SUBCUTANEOUSLY WEEKLY AS  DIRECTED    atorvastatin (LIPITOR) 10 MG tablet Take 1 tablet by mouth nightly    metFORMIN (GLUCOPHAGE) 1000 MG tablet TAKE 1 TABLET BY MOUTH TWICE A DAY WITH MEALS    triamcinolone (KENALOG) 0.1 % cream Apply topically nightly (Patient not taking: Reported on 2/22/2024)    lisinopril

## 2024-03-29 ENCOUNTER — PHARMACY VISIT (OUTPATIENT)
Age: 49
End: 2024-03-29

## 2024-03-29 ENCOUNTER — TELEPHONE (OUTPATIENT)
Age: 49
End: 2024-03-29

## 2024-03-29 VITALS — BODY MASS INDEX: 27.2 KG/M2 | WEIGHT: 195 LBS

## 2024-03-29 DIAGNOSIS — E11.65 TYPE 2 DIABETES MELLITUS WITH HYPERGLYCEMIA, WITHOUT LONG-TERM CURRENT USE OF INSULIN (HCC): Primary | ICD-10-CM

## 2024-03-29 RX ORDER — SEMAGLUTIDE 0.68 MG/ML
0.5 INJECTION, SOLUTION SUBCUTANEOUS
Qty: 9 ML | Refills: 1 | Status: SHIPPED | OUTPATIENT
Start: 2024-03-29 | End: 2024-04-04

## 2024-03-29 RX ORDER — SEMAGLUTIDE 0.68 MG/ML
0.5 INJECTION, SOLUTION SUBCUTANEOUS
Qty: 3 ML | Refills: 1 | Status: SHIPPED | OUTPATIENT
Start: 2024-03-29 | End: 2024-03-29

## 2024-03-29 NOTE — PROGRESS NOTES
Pharmacy Progress Note - Diabetes Management    S/O: Mr. Alonzo Joiner is a 48 y.o. male, referred by Carmenza Phillips MD, with a PMH of T2DM, over weight, dyslipidemia, was seen today for diabetes management.  Patient's last A1c was 9.3% (Feb 2024).         Interim update: Pt was last seen by this writer on 2/29/24 for f/up on DM management.  Pt was doing well with restarting Trulicity; however, he had not been checking his BG rdgs.  Pt was also not taking Glimepiride correctly - 2 tabs daily instead of 1 tab BID.  This was addressed during visit.  Pt was advised to check BG rdgs at home and bring in log.    Pt arrives to clinic today and states he doesn't have Trulicity anymore d/t shortage.  He fills Rx with [facility] and hasn't had med for a few weeks.    He previously was on Mounjaro but his inusurance stopped paying for it - has been on Trulicity ever since.      Current anti-hyperglycemic regimen includes:    - Metformin 1000 mg BID  - Glimepiride 2 mg BID  - Trulicity 1.5 mg weekly    ROS:  Today, Pt endorses:  - Symptoms of Hyperglycemia: none  - Symptoms of Hypoglycemia: none    Self Monitoring Blood Glucose (SMBG) or CGM:  - Brought in home glucometer/blood glucose log/CGM reader today:  no  Fasting 170 after trulicity ran out  No numbers from when on trulicity  Today fasting in office 187    Physical Activity:   yes  - Consists of ellipitical      Vitals:  Wt Readings from Last 3 Encounters:   02/22/24 87.5 kg (193 lb)   10/12/23 85.5 kg (188 lb 6.4 oz)   07/07/23 85.3 kg (188 lb)     BP Readings from Last 3 Encounters:   02/22/24 111/73   10/12/23 124/78   07/07/23 132/76     Pulse Readings from Last 3 Encounters:   02/22/24 80   10/12/23 80   07/07/23 89       Past Medical History:   Diagnosis Date    Diabetes (HCC)     Hypertension      No Known Allergies    Current Outpatient Medications   Medication Sig    blood glucose monitor strips Check blood sugar daily    glimepiride (AMARYL) 2 MG

## 2024-03-29 NOTE — TELEPHONE ENCOUNTER
Pharmacy Progress Note     Contacted insurance to investigate cost of alternative to Trulicity - unable to get d/t shortage.    Ozempic is also Tier 2 medication.  Retail 20% copay of total cost of drug  Thru mail order for 90ds - 20%    This writer completed PA for Ozempic - PA#[id number].  Sent chart notes.    Sent in Rx for Ozempic 0.5 mg weekly to replace Trulicity 1.5 mg weekly    Contacted pt to update.  Provided copay card coupon information.  F/up scheduled for 1 mo    Medications Discontinued During This Encounter   Medication Reason    TRULICITY 1.5 MG/0.5ML SC injection Cost of medication    Semaglutide,0.25 or 0.5MG/DOS, (OZEMPIC, 0.25 OR 0.5 MG/DOSE,) 2 MG/3ML SOPN      Orders Placed This Encounter    DISCONTD: Semaglutide,0.25 or 0.5MG/DOS, (OZEMPIC, 0.25 OR 0.5 MG/DOSE,) 2 MG/3ML SOPN     Sig: Inject 0.5 mg into the skin every 7 days     Dispense:  3 mL     Refill:  1    Semaglutide,0.25 or 0.5MG/DOS, (OZEMPIC, 0.25 OR 0.5 MG/DOSE,) 2 MG/3ML SOPN     Sig: Inject 0.5 mg into the skin every 7 days     Dispense:  9 mL     Refill:  1         Ria Tapia, PharmD, [facility]  Clinical Pharmacist Specialist      For Pharmacy Admin Tracking Only    Program: Medical Group  CPA in place:  Yes  Recommendation Provided To: Patient/Caregiver: 4 via Telephone and Other: 4  Intervention Detail: Benefit Assistance, Discontinued Rx: 1, reason: Cost/Formulary Change, New Rx: 1, reason: Cost/Formulary Change, and Patient Access Assistance/Sample Provided  Intervention Accepted By: Patient/Caregiver: 4 and Other: 4  Time Spent (min): 45

## 2024-04-02 ENCOUNTER — TELEPHONE (OUTPATIENT)
Age: 49
End: 2024-04-02

## 2024-04-02 NOTE — TELEPHONE ENCOUNTER
Pharmacy Progress Note     Pt contacted this writer via phone stating cost of Ozempic was high.    Discussed  coupon with pt.  Provided information.  If cost is still too high, may need to trial a different medication - Basaglar is covered by insurance.    There are no discontinued medications.  No orders of the defined types were placed in this encounter.        Ria Tapia, PharmD, [facility]  Clinical Pharmacist Specialist      For Pharmacy Admin Tracking Only    Program: Medical Group  CPA in place:  Yes  Recommendation Provided To: Patient/Caregiver: 1 via Telephone  Intervention Detail: Patient Access Assistance/Sample Provided  Intervention Accepted By: Patient/Caregiver: 1  Time Spent (min): 10

## 2024-04-04 ENCOUNTER — TELEPHONE (OUTPATIENT)
Age: 49
End: 2024-04-04

## 2024-04-04 DIAGNOSIS — E11.65 TYPE 2 DIABETES MELLITUS WITH HYPERGLYCEMIA, WITHOUT LONG-TERM CURRENT USE OF INSULIN (HCC): Primary | ICD-10-CM

## 2024-04-04 RX ORDER — DULAGLUTIDE 1.5 MG/.5ML
1.5 INJECTION, SOLUTION SUBCUTANEOUS WEEKLY
Qty: 2 ML | Refills: 2 | Status: SHIPPED | OUTPATIENT
Start: 2024-04-04

## 2024-04-04 RX ORDER — GLIMEPIRIDE 4 MG/1
4 TABLET ORAL 2 TIMES DAILY
Qty: 180 TABLET | Refills: 1 | Status: SHIPPED | OUTPATIENT
Start: 2024-04-04

## 2024-04-04 NOTE — TELEPHONE ENCOUNTER
Pharmacy Progress Note     Pt contacted this writer stating Ozempic would cost aprox $3000.  This is unaffordable.  States Trulicity was affordable; however, issues with shortage.  He states [facility] would put him in the queue to have Trulicity sent out when it was available.    Will send Trulicity 1.5 mg to [facility].  Will INCREASE Glimepiride to 4 mg BID in order to have BG controlled in the meantime.    Medications Discontinued During This Encounter   Medication Reason    glimepiride (AMARYL) 2 MG tablet     Semaglutide,0.25 or 0.5MG/DOS, (OZEMPIC, 0.25 OR 0.5 MG/DOSE,) 2 MG/3ML SOPN Cost of medication     Orders Placed This Encounter    glimepiride (AMARYL) 4 MG tablet     Sig: Take 1 tablet by mouth in the morning and at bedtime     Dispense:  180 tablet     Refill:  1    dulaglutide (TRULICITY) 1.5 MG/0.5ML SC injection     Sig: Inject 0.5 mLs into the skin once a week     Dispense:  2 mL     Refill:  2         iRa Tapia PharmD, [facility]  Clinical Pharmacist Specialist      For Pharmacy Admin Tracking Only    Program: Medical Group  CPA in place:  Yes  Recommendation Provided To: Patient/Caregiver: 3 via Telephone  Intervention Detail: Discontinued Rx: 1, reason: Cost/Formulary Change, Dose Adjustment: 1, reason: Therapy Optimization, and New Rx: 1, reason: Needs Additional Therapy  Intervention Accepted By: Patient/Caregiver: 3  Time Spent (min): 15

## 2024-04-12 ENCOUNTER — TELEPHONE (OUTPATIENT)
Age: 49
End: 2024-04-12

## 2024-04-12 NOTE — TELEPHONE ENCOUNTER
Pharmacy Progress Note     Pt was contacted in regards to cost of GLP-1 agonist therapy.  He is saying Ozempic is costing $3k.  Trulicity is unavailable.    Discussed alternative options - Basaglar insulin covered.  Discussed injection.  He does not want to do daily injections.      Suggested to pt that he contact his insurance to verify cost of GLP-1 agonist.  Informed pt that insulin would be the alternative if he wasn't able to do GLP-1 agonist.    Pt will contact this writer with decision.    There are no discontinued medications.  No orders of the defined types were placed in this encounter.        Ria Tapia, PharmD, [facility]  Clinical Pharmacist Specialist      For Pharmacy Admin Tracking Only    Program: Medical Group  CPA in place:  Yes  Recommendation Provided To: Patient/Caregiver: 1 via Telephone  Intervention Detail: Benefit Assistance  Intervention Accepted By: Patient/Caregiver: 1  Time Spent (min): 10

## 2024-04-18 ENCOUNTER — TELEPHONE (OUTPATIENT)
Age: 49
End: 2024-04-18

## 2024-04-18 DIAGNOSIS — E11.65 TYPE 2 DIABETES MELLITUS WITH HYPERGLYCEMIA, WITHOUT LONG-TERM CURRENT USE OF INSULIN (HCC): Primary | ICD-10-CM

## 2024-04-18 NOTE — TELEPHONE ENCOUNTER
[facility] pharmacy called stating that they are needing clarification and a medication filled. They stated that the Trulicity is on back order, and they are hoping to have Ozempic sent in instead. They are aslo wondering where we are starting this patient, as far a dosage. They are hoping to find out if we are working our way up, or if we are going straight to 0.5. They are just hoping to get a call back, or a new script sent in for this.    Best call back number  [phone]  Ref#[id number]

## 2024-04-18 NOTE — TELEPHONE ENCOUNTER
I am not sure if his insurance covers for Ozempic.  I will try to send prescription for Ozempic and he needs to take 0.5 mg every week for this month and then to change to 1 mg every week for next month until we get his Trulicity back.  Please let pharmacy know

## 2024-04-19 ENCOUNTER — TELEPHONE (OUTPATIENT)
Age: 49
End: 2024-04-19

## 2024-04-19 DIAGNOSIS — E11.65 TYPE 2 DIABETES MELLITUS WITH HYPERGLYCEMIA, WITHOUT LONG-TERM CURRENT USE OF INSULIN (HCC): Primary | ICD-10-CM

## 2024-04-19 RX ORDER — INSULIN GLARGINE 100 [IU]/ML
10 INJECTION, SOLUTION SUBCUTANEOUS NIGHTLY
Qty: 5 ADJUSTABLE DOSE PRE-FILLED PEN SYRINGE | Refills: 1 | Status: SHIPPED | OUTPATIENT
Start: 2024-04-19

## 2024-04-19 RX ORDER — PEN NEEDLE, DIABETIC 30 GX3/16"
NEEDLE, DISPOSABLE MISCELLANEOUS
Qty: 100 EACH | Refills: 3 | Status: SHIPPED | OUTPATIENT
Start: 2024-04-19

## 2024-04-19 NOTE — TELEPHONE ENCOUNTER
Pharmacy Progress Note     This writer contacted pt's insurance to clarify order for Ozempic - pt unable to get without clarification.  Concern for cost of Ozempic - $1862 for 90 ds.    Discussed outcome with pt.  He is unable to afford this.    Discussed switching to an alternative - will start Basaglar.    - STOP Ozempic  - START Basaglar 10 units daily  - Device teaching completed  - Coupon for Basaglar cost provided    Medications Discontinued During This Encounter   Medication Reason    dulaglutide (TRULICITY) 1.5 MG/0.5ML SC injection Cost of medication     Orders Placed This Encounter    insulin glargine (BASAGLAR KWIKPEN) 100 UNIT/ML injection pen     Sig: Inject 10 Units into the skin nightly     Dispense:  5 Adjustable Dose Pre-filled Pen Syringe     Refill:  1    Insulin Pen Needle (PEN NEEDLES) 32G X 4 MM MISC     Sig: Use to inject insulin daily     Dispense:  100 each     Refill:  3         Ria Tapia PharmD, [facility]  Clinical Pharmacist Specialist      For Pharmacy Admin Tracking Only    Program: Medical Group  CPA in place:  Yes  Recommendation Provided To: Patient/Caregiver: 6 via Telephone and Other: 6  Intervention Detail: Benefit Assistance, Device Training, Discontinued Rx: 1, reason: Cost/Formulary Change, New Rx: 2, reason: Cost/Formulary Change, and Patient Access Assistance/Sample Provided  Intervention Accepted By: Patient/Caregiver: 6 and Other: 6  Time Spent (min): 20

## 2024-04-24 ENCOUNTER — CLINICAL DOCUMENTATION (OUTPATIENT)
Dept: PRIMARY CARE CLINIC | Facility: CLINIC | Age: 49
End: 2024-04-24

## 2024-04-24 NOTE — PROGRESS NOTES
Pharmacy Progress Note     Order for Basaglar was sent to pharmacy.  It required a PA.  PA completed electronically via Cover My Meds - Key: [id number]    There are no discontinued medications.  No orders of the defined types were placed in this encounter.        Ria Tapia, PharmD, [facility]  Clinical Pharmacist Specialist      For Pharmacy Admin Tracking Only    Program: Medical Group  CPA in place:  Yes  Recommendation Provided To: Other: 1  Intervention Detail: Benefit Assistance  Intervention Accepted By: Other: 1  Time Spent (min): 20

## 2024-04-26 ENCOUNTER — TELEPHONE (OUTPATIENT)
Age: 49
End: 2024-04-26

## 2024-04-26 DIAGNOSIS — E11.65 TYPE 2 DIABETES MELLITUS WITH HYPERGLYCEMIA, WITHOUT LONG-TERM CURRENT USE OF INSULIN (HCC): Primary | ICD-10-CM

## 2024-04-26 RX ORDER — INSULIN GLARGINE 300 U/ML
10 INJECTION, SOLUTION SUBCUTANEOUS NIGHTLY
Qty: 3 ADJUSTABLE DOSE PRE-FILLED PEN SYRINGE | Refills: 1 | Status: SHIPPED | OUTPATIENT
Start: 2024-04-26

## 2024-04-26 NOTE — TELEPHONE ENCOUNTER
Pharmacy Progress Note     Xin KITCHEN was denied.    Contacted insurance to investigate which insulin was preferred.  Toujeo was preferred.    Sent in Toujeo U-300 to mail order pharmacy    Contacted pt via phone to provide update.    Medications Discontinued During This Encounter   Medication Reason    insulin glargine (BASAGLAR KWIKPEN) 100 UNIT/ML injection pen Cost of medication     Orders Placed This Encounter    insulin glargine, 1 unit dial, (TOUJEO SOLOSTAR) 300 UNIT/ML concentrated injection pen     Sig: Inject 10 Units into the skin nightly     Dispense:  3 Adjustable Dose Pre-filled Pen Syringe     Refill:  1         Ria Tapia PharmD, [facility]  Clinical Pharmacist Specialist      For Pharmacy Admin Tracking Only    Program: Medical Group  CPA in place:  Yes  Recommendation Provided To: Patient/Caregiver: 3 via Telephone and Other: 3   Intervention Detail: Benefit Assistance, Discontinued Rx: 1, reason: Cost/Formulary Change, and New Rx: 1, reason: Cost/Formulary Change  Intervention Accepted By: Patient/Caregiver: 3 and Other: 3  Time Spent (min): 30

## 2024-05-03 ENCOUNTER — PHARMACY VISIT (OUTPATIENT)
Age: 49
End: 2024-05-03

## 2024-05-03 VITALS — BODY MASS INDEX: 26.95 KG/M2 | WEIGHT: 193.2 LBS

## 2024-05-03 DIAGNOSIS — E11.65 TYPE 2 DIABETES MELLITUS WITH HYPERGLYCEMIA, WITHOUT LONG-TERM CURRENT USE OF INSULIN (HCC): Primary | ICD-10-CM

## 2024-05-03 NOTE — PROGRESS NOTES
[facility] Pharmacy Note:  Renal Dose Adjustment for Metoclopramide (REGLAN)    Mark Wu has been prescribed Metoclopramide (REGLAN) 10 mg every 8 hours as needed for nausea/vomiting,.     Estimated Creatinine Clearance: 35.2 mL/min (A) (based on SCr of 1.4 Patient was seen with Jacob Morel PharmD, PGY1 resident. I was present for the visit and agree with the assessment and plan. Please see their note for more details of the visit.    Ria Tapia, PharmD, [facility] [place]  Clinical Pharmacist Specialist      For Pharmacy Admin Tracking Only    Program: Medical Group  CPA in place:  Yes  Recommendation Provided To: Patient/Caregiver: 4 via Telephone and Pharmacy: 4  Intervention Detail: Benefit Assistance, Device Training, Discontinued Rx: 1, reason: Cost/Formulary Change, and Patient Access Assistance/Sample Provided  Intervention Accepted By: Patient/Caregiver: 4 and Pharmacy: 4  Time Spent (min): 45

## 2024-05-03 NOTE — PROGRESS NOTES
Pharmacy Progress Note - Diabetes Management    S/O: Mr. Alonzo Joiner is a 48 y.o. male, referred by Carmenza Phillips MD, with a PMH of type 2 diabetes, overweight, hypertension and  hyperlipidemia was seen today for diabetes management.  Patient's last A1c was 9.3% (2/22/2024).       Interim update:     He states that there haven't been any significant changes since the last appointment. Stated that he received Toujeo two days ago (5/1/2024). Has not used the insulin because he does not have pen needles despite the pen needles having been ordered. PharmD followed up with mail-order pharmacy and resolved the issue. Patient will receive pen needles in 3-5 business days. Patient provided with sample pen needles at this visit. Education provided to patient on how to assemble insulin pen, how to self-administer insulin, storage of insulin pens. Patient verbalized understanding and demonstrated technique.    Expresses frustration at not having lost weight despite diet changes and becoming more active.    Medication Adherence/Access:  - Brought in home medications including prescription/non-prescriptions:  no  - Endorses barriers to affording/accessing medications : no  Patient enrolled in a high-deductible insurance plan - limits access to brand name medications  - Endorses adherence to current regimen?: no  Has not started Toujeo yet (see above)  - Uses [facility] & [facility]; Rx insurance is: [facility]    - Previous anti-hyperglycemic agents includes:  - Januvia (cost)  - Jardiance (cost)  - Invokana (cost)  - Pioglitazone (itching, wt gain)  - Trulicity (shortage)  - Ozempic (cost)    Current anti-hyperglycemic regimen includes:    Metformin 1 g BID  Glimepiride 4 mg - BID  Toujeo U-300 - 10 units daily (has not started yet)    ROS:  Today, Pt endorses:  - Symptoms of Hyperglycemia: excessive thirst (once or twice a week at night)  - Symptoms of Hypoglycemia: none    Self Monitoring Blood Glucose

## 2024-05-17 ENCOUNTER — SCHEDULED TELEPHONE ENCOUNTER (OUTPATIENT)
Age: 49
End: 2024-05-17

## 2024-05-17 DIAGNOSIS — E11.65 TYPE 2 DIABETES MELLITUS WITH HYPERGLYCEMIA, WITHOUT LONG-TERM CURRENT USE OF INSULIN (HCC): Primary | ICD-10-CM

## 2024-05-17 RX ORDER — INSULIN GLARGINE 300 U/ML
12 INJECTION, SOLUTION SUBCUTANEOUS NIGHTLY
Qty: 3 ADJUSTABLE DOSE PRE-FILLED PEN SYRINGE | Refills: 1
Start: 2024-05-17

## 2024-05-17 NOTE — PROGRESS NOTES
Pharmacy Progress Note - Diabetes Management    S/O: Mr. Alonzo Joiner is a 48 y.o. male, referred by Carmenza Phillips MD, with a PMH of type 2 diabetes, overweight, hypertension and  hyperlipidemia , was seen today for diabetes management.  Patient's last A1c was 9.3% (2/2024).       Interim update:    Patient seen as a telephone encounter.    He reports having received the order of pen needles and has been using them to inject Toujeo. He reports administering insulin in the morning around 11 AM as it was easier to remember. Did not express any concerns about any of his diabetes medications and reports being adherent. Counseled patient to continue therapy with metformin despite being on insulin. Also reported having checked his BG three times throughout the week.    Reports being active at the gym after work.    Does not report any changes to diet    Medication Adherence/Access:  - Endorses adherence to current regimen?: yes  - Uses [facility]; Rx insurance is: [facility]    Current anti-hyperglycemic regimen includes:    - Metformin 1 g BID  - Glimepiride 4 mg BID  - Toujeo 10 units nightly    ROS:  Today, Pt endorses:  - Symptoms of Hyperglycemia: excessive thirst (commonly occurs in the morning)  - Symptoms of Hypoglycemia: none    Self Monitoring Blood Glucose (SMBG) or CGM:  - Brought in home glucometer/blood glucose log/CGM reader today:  yes  - Checks BG: once every three days  - SMBG range: reports seeing FBG values between 170-180    Nutrition:  - Weekdays consist of daal and roti  - Weekends consist of sandwiches  - Has been trying to include more fruits and vegetables in diet    Physical Activity: yes  - Consists of being active at the gym and performing cardio exercises x 1 hour    Diabetes Health Maintenance:  ASCVD Risk Factors: Age, Total cholesterol, High LDL, and Diabetes  ASA therapy:  N/A  ACE/ARB therapy: Lisinopril 10 mg daily  Optimized statin therapy: Atorvastatin

## 2024-05-17 NOTE — PROGRESS NOTES
Patient was seen with Jacob Morel, PharmD, PGY1 resident. I was present for the visit and agree with the assessment and plan. Please see their note for more details of the visit.    Ria Tapia, PharmD, [facility] [place]  Clinical Pharmacist Specialist      For Pharmacy Admin Tracking Only    Program: Medical Group  CPA in place:  Yes  Recommendation Provided To: Patient/Caregiver: 1 via Telephone  Intervention Detail: Dose Adjustment: 1, reason: Therapy Optimization  Intervention Accepted By: Patient/Caregiver: 1  Time Spent (min): 30

## 2024-05-31 ENCOUNTER — SCHEDULED TELEPHONE ENCOUNTER (OUTPATIENT)
Age: 49
End: 2024-05-31

## 2024-05-31 DIAGNOSIS — E11.65 TYPE 2 DIABETES MELLITUS WITH HYPERGLYCEMIA, WITHOUT LONG-TERM CURRENT USE OF INSULIN (HCC): Primary | ICD-10-CM

## 2024-05-31 NOTE — PROGRESS NOTES
Pharmacy Progress Note - Diabetes Management    S/O: Mr. Alonzo Joiner is a 48 y.o. male, referred by Carmenza Phillips MD, with a PMH of T2DM, dyslipidemia, was seen today for diabetes management.  Patient's last A1c was 9.3% (Feb 2024).       Visit completed via phone only.    Interim update: Pt contacted to f/up on titration of insulin.  Pt had been last seen on 5/17/24 after starting insulin and was given instructions to titrate up insulin dose.    Today, pt states that he did not go up on insulin dose.  He misunderstood the directions.      Diabetes Management:  - Previous anti-hyperglycemic agents includes: GLP-1 agonist (d/c'd d/t cost), SGLT2 inhibitor (d/c'd d/t cost), Pioglitazone (d/c'd d/t edema)    Current anti-hyperglycemic regimen includes:    - Metformin 1000 mg BID  - Glimepiride 4 mg BID  - Toujeo U-300 10 units daily    ROS:  Today, Pt endorses:  - Symptoms of Hyperglycemia: none  - Symptoms of Hypoglycemia: none    Self Monitoring Blood Glucose (SMBG) or CGM:  - Brought in home glucometer/blood glucose log/CGM reader today:  no  Fasting BG avg 175      Vitals:  Wt Readings from Last 3 Encounters:   05/03/24 87.6 kg (193 lb 3.2 oz)   03/29/24 88.5 kg (195 lb)   02/22/24 87.5 kg (193 lb)     BP Readings from Last 3 Encounters:   02/22/24 111/73   10/12/23 124/78   07/07/23 132/76     Pulse Readings from Last 3 Encounters:   02/22/24 80   10/12/23 80   07/07/23 89       Past Medical History:   Diagnosis Date    Diabetes (HCC)     Hypertension      No Known Allergies    Current Outpatient Medications   Medication Sig    insulin glargine, 1 unit dial, (TOUJEO SOLOSTAR) 300 UNIT/ML concentrated injection pen Inject 12 Units into the skin nightly    Insulin Pen Needle (PEN NEEDLES) 32G X 4 MM MISC Use to inject insulin daily    glimepiride (AMARYL) 4 MG tablet Take 1 tablet by mouth in the morning and at bedtime    blood glucose monitor strips Check blood sugar daily    atorvastatin (LIPITOR)

## 2024-06-06 ENCOUNTER — TELEPHONE (OUTPATIENT)
Age: 49
End: 2024-06-06

## 2024-06-06 NOTE — TELEPHONE ENCOUNTER
Pharmacy Progress Note     Pt contacted this writer stating his glucometer is not reading appropriately.  It is not turning on.    Pt was advised to contact customer service for assistance.    There are no discontinued medications.  No orders of the defined types were placed in this encounter.        Ria Tapia, PharmD, [facility] [place]  Clinical Pharmacist Specialist      For Pharmacy Admin Tracking Only    Program: Medical Group  CPA in place:  Yes  Recommendation Provided To: Patient/Caregiver: 1 via Telephone  Intervention Detail: Device Training  Intervention Accepted By: Patient/Caregiver: 1  Time Spent (min): 5

## 2024-06-07 ENCOUNTER — SCHEDULED TELEPHONE ENCOUNTER (OUTPATIENT)
Age: 49
End: 2024-06-07

## 2024-06-07 DIAGNOSIS — E11.65 TYPE 2 DIABETES MELLITUS WITH HYPERGLYCEMIA, WITHOUT LONG-TERM CURRENT USE OF INSULIN (HCC): Primary | ICD-10-CM

## 2024-06-07 NOTE — PROGRESS NOTES
Pharmacy Progress Note     Pt was contacted via phone to f/up on DM management.  Pt does not have any glucometer rdgs as glucometer has been broken.  He is getting a replacement meter to check BG rdgs.    Pt continues to take Toujeo U-300 12 units daily.    Denies sx of lows.    Rescheduled appt for 6/14/24.    There are no discontinued medications.  No orders of the defined types were placed in this encounter.        Ria Tapia, PharmD, [facility]  Clinical Pharmacist Specialist      For Pharmacy Admin Tracking Only    Program: Medical Group  CPA in place:  Yes  Recommendation Provided To: Patient/Caregiver: 1 via Telephone  Intervention Detail: Scheduled Appointment  Intervention Accepted By: Patient/Caregiver: 1  Time Spent (min): 10

## 2024-06-14 ENCOUNTER — PHARMACY VISIT (OUTPATIENT)
Age: 49
End: 2024-06-14

## 2024-06-14 DIAGNOSIS — E11.65 TYPE 2 DIABETES MELLITUS WITH HYPERGLYCEMIA, WITHOUT LONG-TERM CURRENT USE OF INSULIN (HCC): Primary | ICD-10-CM

## 2024-06-14 RX ORDER — GLUCOSAMINE HCL/CHONDROITIN SU 500-400 MG
CAPSULE ORAL
Qty: 100 STRIP | Refills: 2 | Status: SHIPPED | OUTPATIENT
Start: 2024-06-14

## 2024-06-14 NOTE — PATIENT INSTRUCTIONS
Your A1c was 9.3% which was uncontrolled.    Test your fasting blood sugar daily.  If your blood sugar is >150 every day for a week, call me to discuss.  Pharmacist Contact Information:  Ria Tapia PharmD, [facility] [place]  Work Cell: [phone]      Blood Sugar Goal  Fastin-130 mg/dL  1-2 after meals: Less than 180 mg/dL    Carbohydrate Goals  Meal: 30-45 grams   Snacks: 15 grams

## 2024-06-14 NOTE — PROGRESS NOTES
Pharmacy Progress Note - Diabetes Management    S/O: Mr. Alonzo Joiner is a 48 y.o. male, referred by Carmenza Phillips MD, with a PMH of T2DM, dyslipidemia, was seen today for diabetes management.  Patient's last A1c was 9.3% (2024).       Interim update: Pt arrives to clinic today to f/up on long acting insulin dose adjustment.  He previously had no BG rdgs to report d/t glucometer malfunction.  Today, states he has just received new meter.  He does not have any rdgs to report.      Diabetes Management:  - Previous anti-hyperglycemic agents includes: GLP-1 agonist (d/c'd d/t cost), SGLT2 inhibitor (d/c'd d/t cost), Pioglitazone (d/c'd d/t edema)    Current anti-hyperglycemic regimen includes:    - Toujeo U-300 12 units daily  - Metformin 1000 mg BID  - Glimepiride 4 mg BID    ROS:  Today, Pt endorses:  - Symptoms of Hyperglycemia: none  - Symptoms of Hypoglycemia: none    Self Monitoring Blood Glucose (SMBG) or CGM:  - Brought in home glucometer/blood glucose log/CGM reader today:  no  Fasting B mg/dL (in office)    Nutrition:  - Dinner last night: Roti, curd      Vitals:  Wt Readings from Last 3 Encounters:   24 87.6 kg (193 lb 3.2 oz)   24 88.5 kg (195 lb)   24 87.5 kg (193 lb)     BP Readings from Last 3 Encounters:   24 111/73   10/12/23 124/78   23 132/76     Pulse Readings from Last 3 Encounters:   24 80   10/12/23 80   23 89       Past Medical History:   Diagnosis Date    Diabetes (HCC)     Hypertension      No Known Allergies    Current Outpatient Medications   Medication Sig    insulin glargine, 1 unit dial, (TOUJEO SOLOSTAR) 300 UNIT/ML concentrated injection pen Inject 12 Units into the skin nightly    Insulin Pen Needle (PEN NEEDLES) 32G X 4 MM MISC Use to inject insulin daily    glimepiride (AMARYL) 4 MG tablet Take 1 tablet by mouth in the morning and at bedtime    blood glucose monitor strips Check blood sugar daily    atorvastatin

## 2024-06-27 ENCOUNTER — OFFICE VISIT (OUTPATIENT)
Age: 49
End: 2024-06-27
Payer: COMMERCIAL

## 2024-06-27 ENCOUNTER — PHARMACY VISIT (OUTPATIENT)
Age: 49
End: 2024-06-27

## 2024-06-27 VITALS
SYSTOLIC BLOOD PRESSURE: 100 MMHG | OXYGEN SATURATION: 100 % | WEIGHT: 187 LBS | BODY MASS INDEX: 26.18 KG/M2 | HEIGHT: 71 IN | TEMPERATURE: 97.7 F | HEART RATE: 69 BPM | DIASTOLIC BLOOD PRESSURE: 64 MMHG | RESPIRATION RATE: 14 BRPM

## 2024-06-27 DIAGNOSIS — I10 ESSENTIAL (PRIMARY) HYPERTENSION: ICD-10-CM

## 2024-06-27 DIAGNOSIS — Z82.49 FAMILY HISTORY OF EARLY CAD: ICD-10-CM

## 2024-06-27 DIAGNOSIS — E11.65 TYPE 2 DIABETES MELLITUS WITH HYPERGLYCEMIA, WITH LONG-TERM CURRENT USE OF INSULIN (HCC): Primary | ICD-10-CM

## 2024-06-27 DIAGNOSIS — E78.5 DYSLIPIDEMIA, GOAL LDL BELOW 100: ICD-10-CM

## 2024-06-27 DIAGNOSIS — Z79.4 TYPE 2 DIABETES MELLITUS WITH HYPERGLYCEMIA, WITH LONG-TERM CURRENT USE OF INSULIN (HCC): Primary | ICD-10-CM

## 2024-06-27 DIAGNOSIS — G25.81 RESTLESS LEG SYNDROME: ICD-10-CM

## 2024-06-27 LAB
ALBUMIN SERPL-MCNC: 4 G/DL (ref 3.5–5)
ALBUMIN/GLOB SERPL: 1.1 (ref 1.1–2.2)
ALP SERPL-CCNC: 67 U/L (ref 45–117)
ALT SERPL-CCNC: 23 U/L (ref 12–78)
ANION GAP SERPL CALC-SCNC: 3 MMOL/L (ref 5–15)
AST SERPL-CCNC: 14 U/L (ref 15–37)
BILIRUB SERPL-MCNC: 0.8 MG/DL (ref 0.2–1)
BUN SERPL-MCNC: 21 MG/DL (ref 6–20)
BUN/CREAT SERPL: 24 (ref 12–20)
CALCIUM SERPL-MCNC: 8.8 MG/DL (ref 8.5–10.1)
CHLORIDE SERPL-SCNC: 103 MMOL/L (ref 97–108)
CHOLEST SERPL-MCNC: 139 MG/DL
CO2 SERPL-SCNC: 30 MMOL/L (ref 21–32)
CREAT SERPL-MCNC: 0.86 MG/DL (ref 0.7–1.3)
EST. AVERAGE GLUCOSE BLD GHB EST-MCNC: 212 MG/DL
FERRITIN SERPL-MCNC: 52 NG/ML (ref 26–388)
GLOBULIN SER CALC-MCNC: 3.6 G/DL (ref 2–4)
GLUCOSE SERPL-MCNC: 176 MG/DL (ref 65–100)
HBA1C MFR BLD: 9 % (ref 4–5.6)
HDLC SERPL-MCNC: 47 MG/DL
HDLC SERPL: 3 (ref 0–5)
IRON SATN MFR SERPL: 29 % (ref 20–50)
IRON SERPL-MCNC: 114 UG/DL (ref 35–150)
LDLC SERPL CALC-MCNC: 69.6 MG/DL (ref 0–100)
POTASSIUM SERPL-SCNC: 4.4 MMOL/L (ref 3.5–5.1)
PROT SERPL-MCNC: 7.6 G/DL (ref 6.4–8.2)
SODIUM SERPL-SCNC: 136 MMOL/L (ref 136–145)
TIBC SERPL-MCNC: 398 UG/DL (ref 250–450)
TRIGL SERPL-MCNC: 112 MG/DL
VLDLC SERPL CALC-MCNC: 22.4 MG/DL

## 2024-06-27 PROCEDURE — 1036F TOBACCO NON-USER: CPT | Performed by: FAMILY MEDICINE

## 2024-06-27 PROCEDURE — G8427 DOCREV CUR MEDS BY ELIG CLIN: HCPCS | Performed by: FAMILY MEDICINE

## 2024-06-27 PROCEDURE — 3046F HEMOGLOBIN A1C LEVEL >9.0%: CPT | Performed by: FAMILY MEDICINE

## 2024-06-27 PROCEDURE — G8419 CALC BMI OUT NRM PARAM NOF/U: HCPCS | Performed by: FAMILY MEDICINE

## 2024-06-27 PROCEDURE — 99213 OFFICE O/P EST LOW 20 MIN: CPT | Performed by: FAMILY MEDICINE

## 2024-06-27 PROCEDURE — 3074F SYST BP LT 130 MM HG: CPT | Performed by: FAMILY MEDICINE

## 2024-06-27 PROCEDURE — 3078F DIAST BP <80 MM HG: CPT | Performed by: FAMILY MEDICINE

## 2024-06-27 PROCEDURE — 2022F DILAT RTA XM EVC RTNOPTHY: CPT | Performed by: FAMILY MEDICINE

## 2024-06-27 ASSESSMENT — PATIENT HEALTH QUESTIONNAIRE - PHQ9
SUM OF ALL RESPONSES TO PHQ QUESTIONS 1-9: 0
2. FEELING DOWN, DEPRESSED OR HOPELESS: NOT AT ALL
SUM OF ALL RESPONSES TO PHQ QUESTIONS 1-9: 0
SUM OF ALL RESPONSES TO PHQ9 QUESTIONS 1 & 2: 0
1. LITTLE INTEREST OR PLEASURE IN DOING THINGS: NOT AT ALL

## 2024-06-27 ASSESSMENT — ENCOUNTER SYMPTOMS
CONSTIPATION: 0
ABDOMINAL PAIN: 0
DIARRHEA: 0
WHEEZING: 0
SHORTNESS OF BREATH: 0
SORE THROAT: 0
BACK PAIN: 0
NAUSEA: 0
COUGH: 0

## 2024-06-27 NOTE — ASSESSMENT & PLAN NOTE
Uncontrolled, continue current medications pending work up below, medication adherence emphasized, and lifestyle modifications recommended

## 2024-06-27 NOTE — PROGRESS NOTES
Pharmacy Progress Note - Diabetes Management    S/O: Mr. Alonzo Joiner is a 49 y.o. male, referred by Carmenza Phlilips MD, with a PMH of T2DM, dyslipidemia , was seen today for diabetes management.  Patient's last A1c was 9.3% (2024).       Interim update: Pt was last seen by this writer 2 weeks ago.  He had been adherent to recently started insulin; however, he did not have any BG rdgs and no med changes were made at that time.  Refills for test strips and other testing supplies sent.    Pt was seen by PCP today.  Labs were drawn.  He states he still has not checked BG rdgs at home.  He has not picked up testing supplies.  Discussed potential to try CGM sample.  Pt states he has used in the past and did not like the feeling of having something on him at all times.    He is interested in medications that will help with weight loss.  He would like to restart Trulicity - was on in the past but was d/c'd d/t national shortage issues and cost.  He asks if there are any other options for appetite suppression.  Discuss Contrave, but advise that medication, while less expensive than GLP-1 agonist, is still brand name and expensive, if covered by insurance at all.      Medication Adherence/Access:  - Has high deductible insurance plan - brand name meds are cost prohibitive  - Not interested in CGM    Current anti-hyperglycemic regimen includes:    - Metformin 1000 mg BID  - Glimepiride 4 mg BID  - Toujeo U-300 12 units daily    ROS:  Today, Pt endorses:  - Symptoms of Hyperglycemia: none  - Symptoms of Hypoglycemia: none    Self Monitoring Blood Glucose (SMBG) or CGM:  - Brought in home glucometer/blood glucose log/CGM reader today:  no  Could not check at home - was busy work - hasn't picked up test strips  Fasting B mg/dL    Physical Activity:   yes  - Consists of elliptical 1 hour 4x per week      Vitals:  Wt Readings from Last 3 Encounters:   24 84.8 kg (187 lb)   24 87.6 kg (193 lb 3.2

## 2024-06-27 NOTE — PATIENT INSTRUCTIONS
Your A1c was 9.3% which was uncontrolled    Continue all diabetes meds at this time.    Contrave - check coverage with insurance  [email or web address] - check out  coupon.    Blood Sugar Goal  Fastin-130 mg/dL  1-2 after meals: Less than 180 mg/dL    Carbohydrate Goals  Meal: 30-45 grams   Snacks: 15 grams

## 2024-06-27 NOTE — PROGRESS NOTES
Chief Complaint   Patient presents with    Diabetes     Follow up    Cholesterol Problem     Follow up         \"Have you been to the ER, urgent care clinic since your last visit?  Hospitalized since your last visit?\"    NO    “Have you seen or consulted any other health care providers outside of [facility] since your last visit?”    NO          Click Here for Release of Records Request           6/27/2024    11:03 AM   PHQ-9    Little interest or pleasure in doing things 0   Feeling down, depressed, or hopeless 0   PHQ-2 Score 0   PHQ-9 Total Score 0           Financial Resource Strain: Low Risk  (10/12/2023)    Overall Financial Resource Strain (CARDIA)     Difficulty of Paying Living Expenses: Not hard at all      Food Insecurity: Not on file (10/12/2023)          Health Maintenance Due   Topic Date Due    COVID-19 Vaccine (3 - 2023-24 season) 09/01/2023    A1C test (Diabetic or Prediabetic)  05/22/2024        
Dragon, the computer voice recognition software.  Quite often unanticipated grammatical, syntax, homophones, and other interpretive errors are inadvertently transcribed by the computer software.  Please disregard these errors.  Please excuse any errors that have escaped final proofreading.  Thank you.     Return in about 3 months (around 9/27/2024) for follow up, fasting.     Electronically signed by Carmenza Padilla MD on 6/27/24 at 12:37 PM EDT

## 2024-06-28 ENCOUNTER — TELEPHONE (OUTPATIENT)
Age: 49
End: 2024-06-28

## 2024-06-28 DIAGNOSIS — Z79.4 TYPE 2 DIABETES MELLITUS WITH HYPERGLYCEMIA, WITH LONG-TERM CURRENT USE OF INSULIN (HCC): Primary | ICD-10-CM

## 2024-06-28 DIAGNOSIS — E11.65 TYPE 2 DIABETES MELLITUS WITH HYPERGLYCEMIA, WITH LONG-TERM CURRENT USE OF INSULIN (HCC): Primary | ICD-10-CM

## 2024-06-28 NOTE — TELEPHONE ENCOUNTER
Patient called stating that the Trulcity 1.5 mg is available at the [facility] location, and is hoping to have this medication changed. Patient is hoping to have this sent with a 3 month supply. Patient also stated that if we are unable to do the 3 month supply then we can do a 2 month supply. Patient is hoping that this can be done as soon as possible.    Best call back number  [phone]

## 2024-06-29 DIAGNOSIS — I10 ESSENTIAL (PRIMARY) HYPERTENSION: ICD-10-CM

## 2024-06-29 RX ORDER — LISINOPRIL 10 MG/1
TABLET ORAL
Qty: 90 TABLET | Refills: 1 | Status: SHIPPED | OUTPATIENT
Start: 2024-06-29

## 2024-06-29 NOTE — TELEPHONE ENCOUNTER
Please let patient know that prescription for Trulicity 1.5 mg has been sent to pharmacy for 3 months.  Thanks

## 2024-07-03 ENCOUNTER — TELEPHONE (OUTPATIENT)
Age: 49
End: 2024-07-03

## 2024-07-03 NOTE — TELEPHONE ENCOUNTER
Pharmacy Progress Note     Pt contacted via telephone to f/up on voicemail left by pt.    States he received Trulicity 1.5 mg weekly.  He has not started medication.  He wants to know if he should take Trulicity and Toujeo U-300.    He has not checked his BG rdgs.    Instructed pt to check BG rdgs and f/up at scheduled PharmD appt to see what dose adjustments need to be made.    There are no discontinued medications.  No orders of the defined types were placed in this encounter.        Ria Tapia, PharmD, [facility]  Clinical Pharmacist Specialist      For Pharmacy Admin Tracking Only    Program: Medical Group  CPA in place:  Yes  Recommendation Provided To: Patient/Caregiver: 1 via Telephone  Intervention Detail: Device Training  Intervention Accepted By: Patient/Caregiver: 1  Time Spent (min): 10

## 2024-07-12 ENCOUNTER — PHARMACY VISIT (OUTPATIENT)
Age: 49
End: 2024-07-12

## 2024-07-12 DIAGNOSIS — E11.65 TYPE 2 DIABETES MELLITUS WITH HYPERGLYCEMIA, WITH LONG-TERM CURRENT USE OF INSULIN (HCC): Primary | ICD-10-CM

## 2024-07-12 DIAGNOSIS — E11.65 TYPE 2 DIABETES MELLITUS WITH HYPERGLYCEMIA, WITHOUT LONG-TERM CURRENT USE OF INSULIN (HCC): ICD-10-CM

## 2024-07-12 DIAGNOSIS — Z79.4 TYPE 2 DIABETES MELLITUS WITH HYPERGLYCEMIA, WITH LONG-TERM CURRENT USE OF INSULIN (HCC): Primary | ICD-10-CM

## 2024-07-12 RX ORDER — INSULIN GLARGINE 300 U/ML
16 INJECTION, SOLUTION SUBCUTANEOUS NIGHTLY
Qty: 3 ADJUSTABLE DOSE PRE-FILLED PEN SYRINGE | Refills: 1
Start: 2024-07-12

## 2024-07-15 NOTE — PROGRESS NOTES
Patient was seen with PharmD candidate , Gabe Bethea. I was present for the visit and agree with the assessment and plan. Please see their note for more details of the visit.    Ria Tapia, PharmD, [facility]  Clinical Pharmacist Specialist      For Pharmacy Admin Tracking Only    Program: Medical Group  CPA in place:  Yes  Recommendation Provided To: Patient/Caregiver: 3 via Virtual Visit  Intervention Detail: Adherence Monitorin, Discontinued Rx: 1, reason: Therapy De-escalation, and Dose Adjustment: 1, reason: Therapy Optimization  Intervention Accepted By: Patient/Caregiver: 3  Time Spent (min): 30    
glucose monitor strips Test blood sugar once daily. Dispense Freestyle Lite test strips    insulin glargine, 1 unit dial, (TOUJEO SOLOSTAR) 300 UNIT/ML concentrated injection pen Inject 12 Units into the skin nightly    Insulin Pen Needle (PEN NEEDLES) 32G X 4 MM MISC Use to inject insulin daily    glimepiride (AMARYL) 4 MG tablet Take 1 tablet by mouth in the morning and at bedtime    blood glucose monitor strips Check blood sugar daily    atorvastatin (LIPITOR) 10 MG tablet Take 1 tablet by mouth nightly    metFORMIN (GLUCOPHAGE) 1000 MG tablet TAKE 1 TABLET BY MOUTH TWICE A DAY WITH MEALS     No current facility-administered medications for this visit.     Lab Results   Component Value Date/Time     06/27/2024 11:46 AM    K 4.4 06/27/2024 11:46 AM     06/27/2024 11:46 AM    CO2 30 06/27/2024 11:46 AM    BUN 21 06/27/2024 11:46 AM    GFRAA >60 07/21/2022 10:13 AM    GLOB 3.6 06/27/2024 11:46 AM    ALT 23 06/27/2024 11:46 AM     Lab Results   Component Value Date/Time    CHOL 139 06/27/2024 11:46 AM    HDL 47 06/27/2024 11:46 AM    LDL 69.6 06/27/2024 11:46 AM    .2 02/22/2024 09:07 AM    VLDL 22.4 06/27/2024 11:46 AM     Lab Results   Component Value Date/Time    WBC 5.0 11/30/2020 10:37 AM    HGB 15.1 11/30/2020 10:37 AM    HCT 46.4 11/30/2020 10:37 AM     11/30/2020 10:37 AM    MCV 88.7 11/30/2020 10:37 AM       Lab Results   Component Value Date/Time    MICROALBUR 6.37 02/22/2024 09:07 AM    MICROALBUR 1.90 10/05/2022 10:15 AM       Lab Results   Component Value Date    MALBCR 22 02/22/2024    MALBCR 18 10/12/2023    MALBCR 19 07/07/2023     No components found for: \"MALBCREARAT\"     Lab Results   Component Value Date    LABA1C 9.0 (H) 06/27/2024    LABA1C 9.3 (H) 02/22/2024    LABA1C 7.3 (H) 10/12/2023     No results found for: \"UEY8FCQB\"        CrCl cannot be calculated (Unknown ideal weight.).      A/P:    1) T2DM: Per ADA guidelines, Pt's A1c is not at goal of < 7%.  Current

## 2024-07-20 ENCOUNTER — PATIENT MESSAGE (OUTPATIENT)
Age: 49
End: 2024-07-20

## 2024-07-26 ENCOUNTER — PHARMACY VISIT (OUTPATIENT)
Age: 49
End: 2024-07-26

## 2024-07-26 DIAGNOSIS — Z79.4 TYPE 2 DIABETES MELLITUS WITH HYPERGLYCEMIA, WITH LONG-TERM CURRENT USE OF INSULIN (HCC): Primary | ICD-10-CM

## 2024-07-26 DIAGNOSIS — E11.65 TYPE 2 DIABETES MELLITUS WITH HYPERGLYCEMIA, WITH LONG-TERM CURRENT USE OF INSULIN (HCC): Primary | ICD-10-CM

## 2024-07-26 NOTE — PROGRESS NOTES
Patient was seen with PharmD candidate , Gabe Bethea. I was present for the visit and agree with the assessment and plan. Please see their note for more details of the visit.    Ria Tapia, PharmD, BCGP, BCACP  Clinical Pharmacist Specialist    For Pharmacy Admin Tracking Only    Program: Medical Group  CPA in place:  Yes  Recommendation Provided To: Patient/Caregiver: 1 via Virtual Visit  Intervention Detail: Adherence Monitorin  Intervention Accepted By: Patient/Caregiver: 1  Time Spent (min): 30

## 2024-07-26 NOTE — PROGRESS NOTES
Pharmacy Progress Note - Diabetes Management    S/O: Mr. Alonzo Joiner is a 49 y.o. male, referred by Carmenza Phillips MD, with a PMH of T2DM, HTN, was seen today for diabetes management.  Patient's last A1c was 9% (2024).      Interim update:   - Last visit, the plan was to d/c glimepiride when starting trulicity.  - Not feeling well for the past few days, Pt self-diagnose as cold and stressed from work. Symptoms have now resolved.  - Pt did not initiate recommended medication change from last visit; citing the cold and stress.  - Pt did not take BG measures more frequently as per requested.    Medication Adherence/Access:  - Brought in home medications including prescription/non-prescriptions:  NO  - Endorses barriers to affording/accessing medications : NO  - Endorses adherence to current regimen?: NO    Current anti-hyperglycemic regimen includes:    - glimepiride 4 mg QD  - Toujeo U300 16units QHS  - metformin 1000mg BID    ROS:  Today, Pt endorses:  - Symptoms of Hyperglycemia: none  - Symptoms of Hypoglycemia: sweating 1 every 2 days (similar to last visit's endorsement)    Self Monitoring Blood Glucose (SMBG) or CGM:  - Brought in home glucometer/blood glucose log/CGM reader today:  NO  - Checks B points of data in morning   - Fasting SMBG: both around 150      Vitals:  Wt Readings from Last 3 Encounters:   24 84.8 kg (187 lb)   24 87.6 kg (193 lb 3.2 oz)   24 88.5 kg (195 lb)     BP Readings from Last 3 Encounters:   24 100/64   24 111/73   10/12/23 124/78     Pulse Readings from Last 3 Encounters:   24 69   24 80   10/12/23 80       Past Medical History:   Diagnosis Date    Diabetes (HCC)     Hypertension      No Known Allergies    Current Outpatient Medications   Medication Sig    insulin glargine, 1 unit dial, (TOUJEO SOLOSTAR) 300 UNIT/ML concentrated injection pen Inject 16 Units into the skin nightly    lisinopril (PRINIVIL;ZESTRIL) 10 MG

## 2024-08-13 DIAGNOSIS — E11.9 TYPE 2 DIABETES MELLITUS WITHOUT COMPLICATIONS (HCC): ICD-10-CM

## 2024-08-13 RX ORDER — GLIMEPIRIDE 2 MG/1
TABLET ORAL
Qty: 180 TABLET | Refills: 1 | Status: SHIPPED | OUTPATIENT
Start: 2024-08-13 | End: 2024-08-15 | Stop reason: ALTCHOICE

## 2024-08-13 NOTE — TELEPHONE ENCOUNTER
PCP: Carmenza Padilla MD    Last appt: 7/26/2024   Future Appointments   Date Time Provider Department Center   8/30/2024 11:00 AM Ria Tapia RPH [facility] DEP   10/10/2024  8:20 AM Carmenza Padilla MD [facility] DEP       Requested Prescriptions     Pending Prescriptions Disp Refills    glimepiride (AMARYL) 2 MG tablet [Pharmacy Med Name: GLIMEPIRIDE 2 MG TABLET] 180 tablet 1     Sig: TAKE 1 TABLET BY MOUTH TWO (2) TIMES A DAY. BEFORE MEALS

## 2024-08-15 ENCOUNTER — TELEPHONE (OUTPATIENT)
Age: 49
End: 2024-08-15

## 2024-08-15 NOTE — TELEPHONE ENCOUNTER
Pharmacy Progress Note     Pharmacy called with some confusion about Glimepiride dose for pt.    Glimepiride was d/c'd after he restarted Trulicity.    Contacted pt to clarify to him - no answer - LVM.    Contacted pharmacy to clarify.  Order for Glimepiride was cancelled.    Medications Discontinued During This Encounter   Medication Reason    glimepiride (AMARYL) 2 MG tablet Therapy completed     No orders of the defined types were placed in this encounter.        Ria Tapia, PharmD, BCGP, BCACP  Clinical Pharmacist Specialist      For Pharmacy Admin Tracking Only    Program: Medical Group  CPA in place:  Yes  Recommendation Provided To: Patient/Caregiver: 1 via Telephone and Pharmacy: 1  Intervention Detail: Discontinued Rx: 1, reason: Therapy Complete  Intervention Accepted By: Patient/Caregiver: 1 and Pharmacy: 1  Time Spent (min): 15

## 2024-08-15 NOTE — TELEPHONE ENCOUNTER
I will check with Ms. Tapia as patient recently followed up with pharmacist about medication adjustment.  Seems like glimepiride has been discontinued but I received medication refill request from pharmacy about glimepiride 2 mg twice daily before meals.  Will route this message to Ms Shelleygilmar for correct instructions but as patient is on Trulicity and Toujeo does not need to be on glimepiride.

## 2024-08-30 ENCOUNTER — PHARMACY VISIT (OUTPATIENT)
Age: 49
End: 2024-08-30

## 2024-08-30 DIAGNOSIS — E11.65 TYPE 2 DIABETES MELLITUS WITH HYPERGLYCEMIA, WITHOUT LONG-TERM CURRENT USE OF INSULIN (HCC): Primary | ICD-10-CM

## 2024-08-30 RX ORDER — GLUCOSAMINE HCL/CHONDROITIN SU 500-400 MG
CAPSULE ORAL
Qty: 100 STRIP | Refills: 3 | Status: SHIPPED | OUTPATIENT
Start: 2024-08-30

## 2024-08-30 RX ORDER — INSULIN GLARGINE 300 U/ML
16 INJECTION, SOLUTION SUBCUTANEOUS NIGHTLY
Qty: 5 ADJUSTABLE DOSE PRE-FILLED PEN SYRINGE | Refills: 1 | Status: SHIPPED | OUTPATIENT
Start: 2024-08-30

## 2024-08-30 RX ORDER — INSULIN GLARGINE 300 U/ML
16 INJECTION, SOLUTION SUBCUTANEOUS NIGHTLY
Qty: 5 ADJUSTABLE DOSE PRE-FILLED PEN SYRINGE | Refills: 1 | Status: SHIPPED | OUTPATIENT
Start: 2024-08-30 | End: 2024-08-30

## 2024-08-30 NOTE — PROGRESS NOTES
Discussed adherence with pt.  Will increase GLP-1 agonist dose as pt has indicated rdgs are elevated majority of time.  - INCREASE Trulicity to 3 mg weekly  - Continue Toujeo U-300 16 units daily  - Continue Metformin 1000 mg BID  - Refill for Trulicity, Toujeo-300, and test strips provided  - Adherence with Toujeo U-300 addressed  - Will investigate alternative sites for traditional finger sticks      Medication reconciliation was completed during the visit.    Medications Discontinued During This Encounter   Medication Reason    dulaglutide (TRULICITY) 1.5 MG/0.5ML SC injection Therapy completed    insulin glargine, 1 unit dial, (TOUJEO SOLOSTAR) 300 UNIT/ML concentrated injection pen REORDER    blood glucose monitor strips Therapy completed    blood glucose monitor strips Therapy completed    insulin glargine, 1 unit dial, (TOUJEO SOLOSTAR) 300 UNIT/ML concentrated injection pen      Orders Placed This Encounter    Dulaglutide 3 MG/0.5ML SOPN     Sig: Inject 3 mg into the skin once a week     Dispense:  2 mL     Refill:  1    DISCONTD: insulin glargine, 1 unit dial, (TOUJEO SOLOSTAR) 300 UNIT/ML concentrated injection pen     Sig: Inject 16 Units into the skin nightly     Dispense:  5 Adjustable Dose Pre-filled Pen Syringe     Refill:  1    blood glucose monitor strips     Sig: Check blood sugar daily - Freestyle Lite     Dispense:  100 strip     Refill:  3     (1) Identify specific brand and product.  (2) Include specific quantity.  (3) Include frequency.    insulin glargine, 1 unit dial, (TOUJEO SOLOSTAR) 300 UNIT/ML concentrated injection pen     Sig: Inject 16 Units into the skin nightly     Dispense:  5 Adjustable Dose Pre-filled Pen Syringe     Refill:  1       Patient verbalized understanding of the information presented and all of the patient’s questions were answered.  AVS was handed to the patient. Patient advised to call the office with any additional questions or concerns.    Notifications of

## 2024-08-30 NOTE — PATIENT INSTRUCTIONS
Your A1c was 9.0% which was uncontrolled.    INCREASE Trulicity to 3 mg weekly  Continue Toujeo U-300 16 units daily  Continue Metformin 1000 mg BID    I will let you know via [employer] about alternative testing sites for glucometer.    Try [employer] to see if there is a discount on cost of test strips.    Blood Sugar Goal  Fastin-130 mg/dL  1-2 after meals: Less than 180 mg/dL    Carbohydrate Goals  Meal: 30-45 grams   Snacks: 15 grams    Pharmacist Contact Information:  Ria Tapia, PharmD, BCGP, BCACP  Work Phone: [phone] (option #2)

## 2024-09-03 ENCOUNTER — PATIENT MESSAGE (OUTPATIENT)
Age: 49
End: 2024-09-03

## 2024-09-03 VITALS — WEIGHT: 191.6 LBS | BODY MASS INDEX: 26.72 KG/M2

## 2024-09-03 NOTE — TELEPHONE ENCOUNTER
For Pharmacy Admin Tracking Only    Program: Medical Group  CPA in place:  Yes  Recommendation Provided To: Patient/Caregiver: 1 via RES Software Message  Intervention Detail: Device Training  Intervention Accepted By: Patient/Caregiver: 1  Time Spent (min): 10

## 2024-09-05 ENCOUNTER — TELEPHONE (OUTPATIENT)
Dept: PHARMACY | Facility: CLINIC | Age: 49
End: 2024-09-05

## 2024-09-05 NOTE — TELEPHONE ENCOUNTER
*Incoming Call*  For Ria Tapia:    Please call patient to discuss Trulicity prescription, [facility] will not fill 3 month supply until they have clarification from provider    Patient can be reached at [phone]    Yana Llamas [facility]   Ambulatory Pharmacy Clinical   [phone]  Department, toll free: [phone], option 2     For Pharmacy Admin Tracking Only    Program: Medical Group  Gap Closed?: Yes   Time Spent (min): 10

## 2024-09-06 ENCOUNTER — TELEPHONE (OUTPATIENT)
Dept: PHARMACY | Facility: CLINIC | Age: 49
End: 2024-09-06

## 2024-09-06 NOTE — TELEPHONE ENCOUNTER
Pharmacy Progress Note     Pt contacted this writer again, frustrated that he could not reach me directly.  Discussed why there an  available and what their role has been.      Pt asked why he was not able to get 3 mo supply of Trulicity.  Discussed again that before providing a 3 mo supply of medication, will want to make sure he is on a stable dose.  Pt acknowledged understanding that he may get 3 mo supply of med in future.    There are no discontinued medications.  No orders of the defined types were placed in this encounter.        Ria Tapia, PharmD, BCGP, BCACP  Clinical Pharmacist Specialist      For Pharmacy Admin Tracking Only    Program: Medical Group  CPA in place:  Yes  Recommendation Provided To: Patient/Caregiver: 0 via Telephone  Intervention Accepted By: Patient/Caregiver: 0  Time Spent (min): 10

## 2024-09-06 NOTE — TELEPHONE ENCOUNTER
*Incoming Call*    Patient called in returning a call from Ria Tapia    Patient can be reached at [phone]    Yana Llamas [facility]   Ambulatory Pharmacy Clinical   [phone]  Department, toll free: [phone], option 2     For Pharmacy Admin Tracking Only    Program: Medical Group  Gap Closed?: Yes   Time Spent (min): 5

## 2024-09-06 NOTE — TELEPHONE ENCOUNTER
Pharmacy Progress Note     Pt contacted this writer requesting a 3 mo supply of Trulicity.  Reviewed chart.  One month supply of medication was provided with f/up after the higher dose was prescribed.  May increase further.  Wanted to evaluate new dose prior to sending in 3 month supply.    Contacted pt to inform him.  Will send in 3 mo supply of medication if dose is stable.    There are no discontinued medications.  No orders of the defined types were placed in this encounter.        Ria Tapia, PharmD, BCGP, BCACP  Clinical Pharmacist Specialist      For Pharmacy Admin Tracking Only    Program: Medical Group  CPA in place:  Yes  Recommendation Provided To: Patient/Caregiver: 0 via Telephone  Intervention Accepted By: Patient/Caregiver: 0  Time Spent (min): 5

## 2024-09-27 ENCOUNTER — PHARMACY VISIT (OUTPATIENT)
Age: 49
End: 2024-09-27

## 2024-09-27 VITALS — BODY MASS INDEX: 26.69 KG/M2 | WEIGHT: 191.4 LBS

## 2024-09-27 DIAGNOSIS — I10 ESSENTIAL (PRIMARY) HYPERTENSION: ICD-10-CM

## 2024-09-27 DIAGNOSIS — Z82.49 FAMILY HISTORY OF ISCHEMIC HEART DISEASE AND OTHER DISEASES OF THE CIRCULATORY SYSTEM: ICD-10-CM

## 2024-09-27 DIAGNOSIS — E11.65 TYPE 2 DIABETES MELLITUS WITH HYPERGLYCEMIA, WITHOUT LONG-TERM CURRENT USE OF INSULIN (HCC): Primary | ICD-10-CM

## 2024-09-27 RX ORDER — PEN NEEDLE, DIABETIC 30 GX3/16"
NEEDLE, DISPOSABLE MISCELLANEOUS
Qty: 100 EACH | Refills: 3 | Status: SHIPPED | OUTPATIENT
Start: 2024-09-27

## 2024-09-27 RX ORDER — ATORVASTATIN CALCIUM 10 MG/1
10 TABLET, FILM COATED ORAL NIGHTLY
Qty: 90 TABLET | Refills: 1 | Status: SHIPPED | OUTPATIENT
Start: 2024-09-27

## 2024-09-27 RX ORDER — DULAGLUTIDE 4.5 MG/.5ML
4.5 INJECTION, SOLUTION SUBCUTANEOUS WEEKLY
Qty: 6 ML | Refills: 1 | Status: SHIPPED | OUTPATIENT
Start: 2024-09-27

## 2024-09-27 RX ORDER — INSULIN GLARGINE 300 U/ML
16 INJECTION, SOLUTION SUBCUTANEOUS NIGHTLY
Qty: 5 ADJUSTABLE DOSE PRE-FILLED PEN SYRINGE | Refills: 1 | Status: SHIPPED | OUTPATIENT
Start: 2024-09-27

## 2024-09-27 RX ORDER — LISINOPRIL 10 MG/1
10 TABLET ORAL DAILY
Qty: 90 TABLET | Refills: 1 | Status: SHIPPED | OUTPATIENT
Start: 2024-09-27

## 2024-09-27 NOTE — PROGRESS NOTES
32G X 4 MM MISC Use to inject insulin daily    atorvastatin (LIPITOR) 10 MG tablet Take 1 tablet by mouth nightly    metFORMIN (GLUCOPHAGE) 1000 MG tablet TAKE 1 TABLET BY MOUTH TWICE A DAY WITH MEALS     No current facility-administered medications for this visit.     Lab Results   Component Value Date/Time     06/27/2024 11:46 AM    K 4.4 06/27/2024 11:46 AM     06/27/2024 11:46 AM    CO2 30 06/27/2024 11:46 AM    BUN 21 06/27/2024 11:46 AM    GFRAA >60 07/21/2022 10:13 AM    GLOB 3.6 06/27/2024 11:46 AM    ALT 23 06/27/2024 11:46 AM     Lab Results   Component Value Date/Time    CHOL 139 06/27/2024 11:46 AM    HDL 47 06/27/2024 11:46 AM    LDL 69.6 06/27/2024 11:46 AM    .2 02/22/2024 09:07 AM    VLDL 22.4 06/27/2024 11:46 AM     Lab Results   Component Value Date/Time    WBC 5.0 11/30/2020 10:37 AM    HGB 15.1 11/30/2020 10:37 AM    HCT 46.4 11/30/2020 10:37 AM     11/30/2020 10:37 AM    MCV 88.7 11/30/2020 10:37 AM       Lab Results   Component Value Date/Time    MICROALBUR 6.37 02/22/2024 09:07 AM    MICROALBUR 1.90 10/05/2022 10:15 AM       Lab Results   Component Value Date    MALBCR 22 02/22/2024    MALBCR 18 10/12/2023    MALBCR 19 07/07/2023     No components found for: \"MALBCREARAT\"     Lab Results   Component Value Date    LABA1C 9.0 (H) 06/27/2024    LABA1C 9.3 (H) 02/22/2024    LABA1C 7.3 (H) 10/12/2023     No results found for: \"OPK7GHEG\"        Estimated Creatinine Clearance: 111 mL/min (based on SCr of 0.86 mg/dL).      A/P:    1) T2DM: Per ADA guidelines, Pt's A1c is not at goal of < 7%.  Current SMBG(s)/CGM trend indicates uncontrolled fasting rdgs.  GLP-1 agonist dose has not been optimized.  He had noted potential bloating as a side effect; however, he tolerates med well overall.  Will increase.  Pt requests refills of meds.  - INCREASE Trulicity to 4.5 mg weekly  - Continue Troujeo U-300 16 units daily  - Continue Metformin 1000 mg BID  - Encouraged use of simethicone

## 2024-10-10 ENCOUNTER — OFFICE VISIT (OUTPATIENT)
Age: 49
End: 2024-10-10

## 2024-10-10 VITALS
OXYGEN SATURATION: 99 % | HEART RATE: 75 BPM | BODY MASS INDEX: 26.82 KG/M2 | HEIGHT: 71 IN | RESPIRATION RATE: 16 BRPM | DIASTOLIC BLOOD PRESSURE: 76 MMHG | SYSTOLIC BLOOD PRESSURE: 113 MMHG | WEIGHT: 191.6 LBS | TEMPERATURE: 97.1 F

## 2024-10-10 DIAGNOSIS — E11.65 TYPE 2 DIABETES MELLITUS WITH HYPERGLYCEMIA, WITH LONG-TERM CURRENT USE OF INSULIN (HCC): Primary | ICD-10-CM

## 2024-10-10 DIAGNOSIS — Z82.49 FAMILY HISTORY OF EARLY CAD: ICD-10-CM

## 2024-10-10 DIAGNOSIS — Z23 NEEDS FLU SHOT: ICD-10-CM

## 2024-10-10 DIAGNOSIS — Z79.4 TYPE 2 DIABETES MELLITUS WITH HYPERGLYCEMIA, WITH LONG-TERM CURRENT USE OF INSULIN (HCC): Primary | ICD-10-CM

## 2024-10-10 DIAGNOSIS — E78.5 DYSLIPIDEMIA, GOAL LDL BELOW 100: ICD-10-CM

## 2024-10-10 DIAGNOSIS — I10 ESSENTIAL (PRIMARY) HYPERTENSION: ICD-10-CM

## 2024-10-10 PROBLEM — E11.9 TYPE 2 DIABETES MELLITUS WITHOUT COMPLICATION, WITH LONG-TERM CURRENT USE OF INSULIN (HCC): Status: ACTIVE | Noted: 2024-10-10

## 2024-10-10 LAB
ALBUMIN SERPL-MCNC: 4.3 G/DL (ref 3.5–5)
ALBUMIN/GLOB SERPL: 1.2 (ref 1.1–2.2)
ALP SERPL-CCNC: 76 U/L (ref 45–117)
ALT SERPL-CCNC: 22 U/L (ref 12–78)
ANION GAP SERPL CALC-SCNC: 2 MMOL/L (ref 2–12)
AST SERPL-CCNC: 8 U/L (ref 15–37)
BILIRUB SERPL-MCNC: 0.7 MG/DL (ref 0.2–1)
BUN SERPL-MCNC: 13 MG/DL (ref 6–20)
BUN/CREAT SERPL: 16 (ref 12–20)
CALCIUM SERPL-MCNC: 9 MG/DL (ref 8.5–10.1)
CHLORIDE SERPL-SCNC: 100 MMOL/L (ref 97–108)
CHOLEST SERPL-MCNC: 169 MG/DL
CO2 SERPL-SCNC: 32 MMOL/L (ref 21–32)
CREAT SERPL-MCNC: 0.82 MG/DL (ref 0.7–1.3)
CREAT UR-MCNC: 210 MG/DL
EST. AVERAGE GLUCOSE BLD GHB EST-MCNC: 212 MG/DL
GLOBULIN SER CALC-MCNC: 3.5 G/DL (ref 2–4)
GLUCOSE SERPL-MCNC: 232 MG/DL (ref 65–100)
HBA1C MFR BLD: 9 % (ref 4–5.6)
HDLC SERPL-MCNC: 48 MG/DL
HDLC SERPL: 3.5 (ref 0–5)
LDLC SERPL CALC-MCNC: 98.2 MG/DL (ref 0–100)
MICROALBUMIN UR-MCNC: 7.95 MG/DL
MICROALBUMIN/CREAT UR-RTO: 38 MG/G (ref 0–30)
POTASSIUM SERPL-SCNC: 4.2 MMOL/L (ref 3.5–5.1)
PROT SERPL-MCNC: 7.8 G/DL (ref 6.4–8.2)
SODIUM SERPL-SCNC: 134 MMOL/L (ref 136–145)
TRIGL SERPL-MCNC: 114 MG/DL
VLDLC SERPL CALC-MCNC: 22.8 MG/DL

## 2024-10-10 RX ORDER — DULAGLUTIDE 4.5 MG/.5ML
4.5 INJECTION, SOLUTION SUBCUTANEOUS WEEKLY
Qty: 6 ML | Refills: 1 | Status: SHIPPED | OUTPATIENT
Start: 2024-10-10

## 2024-10-10 ASSESSMENT — PATIENT HEALTH QUESTIONNAIRE - PHQ9
SUM OF ALL RESPONSES TO PHQ QUESTIONS 1-9: 0
SUM OF ALL RESPONSES TO PHQ9 QUESTIONS 1 & 2: 0
1. LITTLE INTEREST OR PLEASURE IN DOING THINGS: NOT AT ALL
SUM OF ALL RESPONSES TO PHQ QUESTIONS 1-9: 0
2. FEELING DOWN, DEPRESSED OR HOPELESS: NOT AT ALL

## 2024-10-10 ASSESSMENT — ENCOUNTER SYMPTOMS
SORE THROAT: 0
DIARRHEA: 0
ABDOMINAL PAIN: 0
CONSTIPATION: 0
NAUSEA: 0
SHORTNESS OF BREATH: 0
WHEEZING: 0
BACK PAIN: 0
COUGH: 0

## 2024-10-10 NOTE — PROGRESS NOTES
Chief Complaint   Patient presents with    Diabetes     Follow up    Cholesterol Problem     Follow up         \"Have you been to the ER, urgent care clinic since your last visit?  Hospitalized since your last visit?\"    NO    “Have you seen or consulted any other health care providers outside of [facility] since your last visit?”    NO          Click Here for Release of Records Request           10/10/2024     8:25 AM   PHQ-9    Little interest or pleasure in doing things 0   Feeling down, depressed, or hopeless 0   PHQ-2 Score 0   PHQ-9 Total Score 0           Financial Resource Strain: Low Risk  (10/12/2023)    Overall Financial Resource Strain (CARDIA)     Difficulty of Paying Living Expenses: Not hard at all      Food Insecurity: Not on file (10/12/2023)          Health Maintenance Due   Topic Date Due    Flu vaccine (1) 08/01/2024    COVID-19 Vaccine (3 - 2023-24 season) 09/01/2024    Diabetic retinal exam  09/26/2024    A1C test (Diabetic or Prediabetic)  09/27/2024

## 2024-10-10 NOTE — ASSESSMENT & PLAN NOTE
Chronic, not at goal (unstable), continue current plan pending work up below, medication adherence emphasized, and lifestyle modifications recommended

## 2024-10-10 NOTE — PROGRESS NOTES
Date:10/10/2024        Patient Name:Alonzo Joiner     YOB: 1975     Age:49 y.o.    Seen today for   Chief Complaint   Patient presents with    Diabetes     Follow up    Cholesterol Problem     Follow up       HPI     History of Present Illness  The patient presents for a follow-up appointment.    He is currently on a regimen of Trulicity 3 mg, which he has been taking consistently for the past 3 months. His home blood sugar readings have been around 170. He also takes insulin at a dose of 16 units and metformin 1000 mg twice daily. His medication list includes atorvastatin 10 mg and lisinopril 10 mg. He has not yet had an eye examination.    Endocrine Review  He is seen for diabetes.  Since last visit he reports: no polyuria or polydipsia, no chest pain, dyspnea or TIA's, no numbness, tingling or pain in extremities, no unusual visual symptoms, no hypoglycemia, no medication side effects noted, weight has decreased, no significant changes.  Testing: is not performed.  He reports medication compliance: taking Metformin one time only.  Medication side effects: none.  Diabetic diet compliance: compliant all of the time.  Lab review: Labs reviewed with patient.  Patient on metformin 1 g twice daily as well as recently he was started onMounjaro injection with significant improvement in sugar.  His glimepiride was stopped during that time.  This year his insurance is not covering for Mounjaro and he is not able to use coupons for that medication also.  He was changed to Trulicity and that was also not available due to shortage.  On last visit he was resumed back on Trulicity at the dose of 3 mg and then increase to the dose of 4.5 mg as well as he was also started on on Toujeo 10 units daily and then changed to 16 unit.  His glimepiride has been stopped and now he is on oral metformin 1 g twice daily     Cardiovascular Review  The patient has hypertension, hyperlipidemia and Family h/o CAD.  He reports

## 2024-10-28 ENCOUNTER — TELEPHONE (OUTPATIENT)
Dept: PHARMACY | Facility: CLINIC | Age: 49
End: 2024-10-28

## 2024-10-28 NOTE — TELEPHONE ENCOUNTER
**[facility] is [facility]**    Attempt made to contact patient at the home number.    Cancelled appointment on 10/25/24 at 10:30 am with Ria Tapia    Left a message requesting a call back at [phone] to schedule the appointment    *Letter Sent*    Yana Llamas [facility]   Ambulatory Pharmacy Clinical   [phone]  Department, toll free: [phone], option 2       For Pharmacy Admin Tracking Only    Program: Medical Group  Gap Closed?: No   Time Spent (min): 5

## 2024-10-28 NOTE — TELEPHONE ENCOUNTER
Reason for referral: 1mo f/u  Referring provider: Dr Padilla  Referring provider office: [facility]  Referred to: Ria Tapia, PharmD, BCGP, BCACP  Status of Patient: Existing Patient  Length of Appt: 30 minutes  Type of Appt: In Person   Patient need address: No

## 2025-02-06 ENCOUNTER — OFFICE VISIT (OUTPATIENT)
Age: 50
End: 2025-02-06
Payer: COMMERCIAL

## 2025-02-06 VITALS
WEIGHT: 187 LBS | DIASTOLIC BLOOD PRESSURE: 86 MMHG | OXYGEN SATURATION: 96 % | SYSTOLIC BLOOD PRESSURE: 121 MMHG | TEMPERATURE: 96.8 F | RESPIRATION RATE: 16 BRPM | HEART RATE: 87 BPM | HEIGHT: 71 IN | BODY MASS INDEX: 26.18 KG/M2

## 2025-02-06 DIAGNOSIS — Z79.4 TYPE 2 DIABETES MELLITUS WITH HYPERGLYCEMIA, WITH LONG-TERM CURRENT USE OF INSULIN (HCC): Primary | ICD-10-CM

## 2025-02-06 DIAGNOSIS — E11.65 TYPE 2 DIABETES MELLITUS WITH HYPERGLYCEMIA, WITH LONG-TERM CURRENT USE OF INSULIN (HCC): Primary | ICD-10-CM

## 2025-02-06 DIAGNOSIS — Z82.49 FAMILY HISTORY OF EARLY CAD: ICD-10-CM

## 2025-02-06 DIAGNOSIS — Z12.11 COLON CANCER SCREENING: ICD-10-CM

## 2025-02-06 DIAGNOSIS — E78.5 DYSLIPIDEMIA, GOAL LDL BELOW 100: ICD-10-CM

## 2025-02-06 PROBLEM — H52.4 PRESBYOPIA: Status: ACTIVE | Noted: 2025-02-06

## 2025-02-06 PROCEDURE — 1036F TOBACCO NON-USER: CPT | Performed by: FAMILY MEDICINE

## 2025-02-06 PROCEDURE — 3046F HEMOGLOBIN A1C LEVEL >9.0%: CPT | Performed by: FAMILY MEDICINE

## 2025-02-06 PROCEDURE — G8419 CALC BMI OUT NRM PARAM NOF/U: HCPCS | Performed by: FAMILY MEDICINE

## 2025-02-06 PROCEDURE — 2022F DILAT RTA XM EVC RTNOPTHY: CPT | Performed by: FAMILY MEDICINE

## 2025-02-06 PROCEDURE — G8427 DOCREV CUR MEDS BY ELIG CLIN: HCPCS | Performed by: FAMILY MEDICINE

## 2025-02-06 PROCEDURE — 99214 OFFICE O/P EST MOD 30 MIN: CPT | Performed by: FAMILY MEDICINE

## 2025-02-06 SDOH — ECONOMIC STABILITY: FOOD INSECURITY: WITHIN THE PAST 12 MONTHS, YOU WORRIED THAT YOUR FOOD WOULD RUN OUT BEFORE YOU GOT MONEY TO BUY MORE.: NEVER TRUE

## 2025-02-06 SDOH — ECONOMIC STABILITY: FOOD INSECURITY: WITHIN THE PAST 12 MONTHS, THE FOOD YOU BOUGHT JUST DIDN'T LAST AND YOU DIDN'T HAVE MONEY TO GET MORE.: NEVER TRUE

## 2025-02-06 ASSESSMENT — PATIENT HEALTH QUESTIONNAIRE - PHQ9
2. FEELING DOWN, DEPRESSED OR HOPELESS: NOT AT ALL
SUM OF ALL RESPONSES TO PHQ QUESTIONS 1-9: 0
SUM OF ALL RESPONSES TO PHQ9 QUESTIONS 1 & 2: 0
1. LITTLE INTEREST OR PLEASURE IN DOING THINGS: NOT AT ALL
SUM OF ALL RESPONSES TO PHQ QUESTIONS 1-9: 0

## 2025-02-06 ASSESSMENT — ENCOUNTER SYMPTOMS
BACK PAIN: 0
ABDOMINAL PAIN: 0
WHEEZING: 0
DIARRHEA: 0
SHORTNESS OF BREATH: 0
SORE THROAT: 0
CONSTIPATION: 0
COUGH: 0
NAUSEA: 0

## 2025-02-06 NOTE — PROGRESS NOTES
Chief Complaint   Patient presents with    Diabetes     Follow up    Cholesterol Problem     Follow up         \"Have you been to the ER, urgent care clinic since your last visit?  Hospitalized since your last visit?\"    NO    “Have you seen or consulted any other health care providers outside of [facility] since your last visit?”    NO          Click Here for Release of Records Request           2/6/2025    10:59 AM   PHQ-9    Little interest or pleasure in doing things 0   Feeling down, depressed, or hopeless 0   PHQ-2 Score 0   PHQ-9 Total Score 0           Financial Resource Strain: Low Risk  (10/12/2023)    Overall Financial Resource Strain (CARDIA)     Difficulty of Paying Living Expenses: Not hard at all      Food Insecurity: No Food Insecurity (2/6/2025)    Hunger Vital Sign     Worried About Running Out of Food in the Last Year: Never true     Ran Out of Food in the Last Year: Never true          Health Maintenance Due   Topic Date Due    COVID-19 Vaccine (3 - 2024-25 season) 09/01/2024    Diabetic retinal exam  09/26/2024    A1C test (Diabetic or Prediabetic)  01/10/2025    Diabetic foot exam  02/22/2025        
  Neurological:      General: No focal deficit present.      Mental Status: He is alert and oriented to person, place, and time. Mental status is at baseline.   Psychiatric:         Mood and Affect: Mood normal.         Behavior: Behavior normal.         Labs/Imaging/Diagnostics   Labs:  Lab Results   Component Value Date    WBC 5.0 11/30/2020    HGB 15.1 11/30/2020    HCT 46.4 11/30/2020    MCV 88.7 11/30/2020     11/30/2020     Lab Results   Component Value Date     (L) 10/10/2024    K 4.2 10/10/2024     10/10/2024    CO2 32 10/10/2024    BUN 13 10/10/2024    CREATININE 0.82 10/10/2024    GLUCOSE 232 (H) 10/10/2024    CALCIUM 9.0 10/10/2024    BILITOT 0.7 10/10/2024    ALKPHOS 76 10/10/2024    AST 8 (L) 10/10/2024    ALT 22 10/10/2024    LABGLOM >90 10/10/2024    GFRAA >60 07/21/2022    AGRATIO 1.0 (L) 04/20/2023    GLOB 3.5 10/10/2024     Hemoglobin A1C   Date Value Ref Range Status   10/10/2024 9.0 (H) 4.0 - 5.6 % Final     Comment:     (NOTE)  HbA1C Interpretive Ranges  <5.7              Normal  5.7 - 6.4         Consider Prediabetes  >6.5              Consider Diabetes            Assessment & Plan      1. Type 2 diabetes mellitus with hyperglycemia, with long-term current use of insulin (HCC)  Assessment & Plan:   Chronic, not at goal (unstable), continue current plan pending work up below, medication adherence emphasized, and lifestyle modifications recommended  Orders:  -     Hemoglobin A1C; Future  -     Comprehensive Metabolic Panel; Future  -     Lipid Panel; Future  -     Catalina Maravilla MD, CardiologyFrankie ([facility])  -      DIABETES FOOT EXAM  2. Dyslipidemia, goal LDL below 100  -     Comprehensive Metabolic Panel; Future  -     Lipid Panel; Future  -     Catalina Maravilla MD, CardiologyFrankie ([address])  3. Family history of early CAD  -     Lipid Panel; Future  -     Catlaina Maravilla MD, CardiologyFrankie ([facility])  4. Colon cancer

## 2025-02-07 ENCOUNTER — TELEPHONE (OUTPATIENT)
Dept: PHARMACY | Facility: CLINIC | Age: 50
End: 2025-02-07

## 2025-02-07 LAB
ALBUMIN SERPL-MCNC: 4.1 G/DL (ref 3.5–5)
ALBUMIN/GLOB SERPL: 1.2 (ref 1.1–2.2)
ALP SERPL-CCNC: 78 U/L (ref 45–117)
ALT SERPL-CCNC: 16 U/L (ref 12–78)
ANION GAP SERPL CALC-SCNC: 3 MMOL/L (ref 2–12)
AST SERPL-CCNC: 13 U/L (ref 15–37)
BILIRUB SERPL-MCNC: 0.8 MG/DL (ref 0.2–1)
BUN SERPL-MCNC: 14 MG/DL (ref 6–20)
BUN/CREAT SERPL: 17 (ref 12–20)
CALCIUM SERPL-MCNC: 9 MG/DL (ref 8.5–10.1)
CHLORIDE SERPL-SCNC: 100 MMOL/L (ref 97–108)
CHOLEST SERPL-MCNC: 159 MG/DL
CO2 SERPL-SCNC: 31 MMOL/L (ref 21–32)
CREAT SERPL-MCNC: 0.82 MG/DL (ref 0.7–1.3)
EST. AVERAGE GLUCOSE BLD GHB EST-MCNC: 252 MG/DL
GLOBULIN SER CALC-MCNC: 3.5 G/DL (ref 2–4)
GLUCOSE SERPL-MCNC: 249 MG/DL (ref 65–100)
HBA1C MFR BLD: 10.4 % (ref 4–5.6)
HDLC SERPL-MCNC: 48 MG/DL
HDLC SERPL: 3.3 (ref 0–5)
LDLC SERPL CALC-MCNC: 89 MG/DL (ref 0–100)
POTASSIUM SERPL-SCNC: 4.2 MMOL/L (ref 3.5–5.1)
PROT SERPL-MCNC: 7.6 G/DL (ref 6.4–8.2)
SODIUM SERPL-SCNC: 134 MMOL/L (ref 136–145)
TRIGL SERPL-MCNC: 110 MG/DL
VLDLC SERPL CALC-MCNC: 22 MG/DL

## 2025-02-07 NOTE — TELEPHONE ENCOUNTER
*Patient requested to change appointment to 2/13/25 at 9:30 am    Yana Llamas [facility]   Ambulatory Pharmacy Clinical   [phone]  Department, toll free: [phone], option 2     For Pharmacy Admin Tracking Only    Program: Medical Group  Gap Closed?: Yes   Time Spent (min): 10

## 2025-02-07 NOTE — TELEPHONE ENCOUNTER
----- Message from Ria Tapia RPH sent at 2/7/2025  2:53 PM EST -----  Will you see if you can get this patient scheduled in to see me.  Could be virtual if needed.

## 2025-02-07 NOTE — TELEPHONE ENCOUNTER
**[facility]**    Attempt made to contact patient at the home number.    Spoke to patient at home number and advised them of the previous message.    Patient verified understanding and scheduled a in person appointment .  Appointment scheduled for 2/14/25 at 8:00 am with Ria Tapia.    Yana Llamas [facility]   Ambulatory Pharmacy Clinical   [phone]  Department, toll free: [phone], option 2       For Pharmacy Admin Tracking Only    Program: Medical Group  Recommendation Provided To: Patient/Caregiver: 1 via Telephone  Intervention Detail: Scheduled Appointment  Intervention Accepted By: Patient/Caregiver: 1  Gap Closed?: Yes   Time Spent (min): 10

## 2025-02-07 NOTE — RESULT ENCOUNTER NOTE
Shameka Rosado,  I am shocked with your results.  You are on 4.5 mg of Trulicity and 16 units of Toujeo along with metformin .  Results are highly abnormal and consistent with uncontrolled diabetes.  Your fasting sugar is also in very high range.  We need to increase dose of Toujeo and please take 24 units.  Kidney and liver functions are normal as well as cholesterol profile is excellent.  I also recommend you to consider mealtime insulin and likely you have insulin-dependent diabetes.Another option is to consider medicine that helps you to get sugar out through urine including Jardiance or Farxiga.   I am sending message to Ms. Tapia also.  Will schedule appointment with her to go over carb counting and mealtime insulin along with getting approval for CGM(freestyle or Dexcom) so you can check your sugar with each meals and can inject insulin accordingly.  Dexcom or freestyle will also help you to find out which food you need to avoid.  Thanks  Shameka Rollins,  Can we try to get approval for CGM ?  Or can he get SGLT2 inhibitor approval ?  Thanks

## 2025-02-13 ENCOUNTER — PHARMACY VISIT (OUTPATIENT)
Age: 50
End: 2025-02-13

## 2025-02-13 DIAGNOSIS — Z79.4 TYPE 2 DIABETES MELLITUS WITH HYPERGLYCEMIA, WITH LONG-TERM CURRENT USE OF INSULIN (HCC): Primary | ICD-10-CM

## 2025-02-13 DIAGNOSIS — E11.65 TYPE 2 DIABETES MELLITUS WITH HYPERGLYCEMIA, WITH LONG-TERM CURRENT USE OF INSULIN (HCC): Primary | ICD-10-CM

## 2025-02-13 NOTE — PATIENT INSTRUCTIONS
Your A1c was 10.4% which was uncontrolled.    INCREASE Toujeo to 16 units daily  Continue all other medications    Blood Sugar Goal  Fastin-130 mg/dL  1-2 after meals: Less than 180 mg/dL    Carbohydrate Goals  Meal: 30-45 grams   Snacks: 15 grams    Pharmacist Contact Information:  Ria Tapia PharmD, BCGP, BCACP  Work Phone: [phone] (option #2)

## 2025-02-13 NOTE — PROGRESS NOTES
Pharmacy Progress Note - Diabetes Management    S/O: Mr. Alonzo Joiner is a 49 y.o. male, referred by Carmenza Phillips MD,  has a past medical history of Diabetes (HCC) and Hypertension..  Pt was seen today for diabetes management.  Patient's last A1c was:   Hemoglobin A1C   Date Value Ref Range Status   02/06/2025 10.4 (H) 4.0 - 5.6 % Final     Comment:     (NOTE)  HbA1C Interpretive Ranges  <5.7              Normal  5.7 - 6.4         Consider Prediabetes  >6.5              Consider Diabetes         Subjective      Interim Update: Pt arrives to clinic today to f/up on DM management.  His A1c is uncontrolled.  States he has been drinking juices in December and January.  He has been using his new juice press.    Reviewed meds with pt.  He has been taking Toujeo U-300 14 units instead of 16 units daily as prescribed.  He has been adherent to Metformin and Trulicity.      Current anti-hyperglycemic regimen includes:    Key Antihyperglycemic Medications               Dulaglutide (TRULICITY) 4.5 MG/0.5ML SOPN Inject 4.5 mg into the skin once a week    metFORMIN (GLUCOPHAGE) 1000 MG tablet Take 1 tablet by mouth 2 times daily (with meals)    insulin glargine, 1 unit dial, (TOUJEO SOLOSTAR) 300 UNIT/ML concentrated injection pen Inject 16 Units into the skin nightly            Medication Adherence/Access:  Has a high deductible insurance plan    ROS:  Today, Pt endorses:  - Symptoms of Hyperglycemia: excessive thirst  - Symptoms of Hypoglycemia: none    Self Monitoring Blood Glucose (SMBG) or CGM:  - Brought in home glucometer/blood glucose log/CGM reader today:  no  Fasting bg in office 172  Last checked at home 2 months ago  Not interested in checking BG at home  Not interested in CGM - does not like the idea of having a device on him    Nutrition:  11AM - raspberry juice  2:30 - fried rice - peanuts and tomatoes  Drinks water    Physical Activity:   no    Diabetes Health Maintenance:  ASA therapy:  no

## 2025-02-21 ENCOUNTER — TELEPHONE (OUTPATIENT)
Age: 50
End: 2025-02-21

## 2025-02-21 NOTE — TELEPHONE ENCOUNTER
Needs a new patient appt per Dr GERDA oneal/Dr Bautista- dx: Type 2 diabetes mellitus with hyperglycemia, with long-term current use of insulin. Patient stated that he would c/b to tony appt

## 2025-03-14 ENCOUNTER — PHARMACY VISIT (OUTPATIENT)
Age: 50
End: 2025-03-14

## 2025-03-14 ENCOUNTER — TELEPHONE (OUTPATIENT)
Age: 50
End: 2025-03-14

## 2025-03-14 DIAGNOSIS — I10 ESSENTIAL (PRIMARY) HYPERTENSION: ICD-10-CM

## 2025-03-14 DIAGNOSIS — Z82.49 FAMILY HISTORY OF ISCHEMIC HEART DISEASE AND OTHER DISEASES OF THE CIRCULATORY SYSTEM: ICD-10-CM

## 2025-03-14 DIAGNOSIS — E11.65 TYPE 2 DIABETES MELLITUS WITH HYPERGLYCEMIA, WITH LONG-TERM CURRENT USE OF INSULIN (HCC): Primary | ICD-10-CM

## 2025-03-14 DIAGNOSIS — Z79.4 TYPE 2 DIABETES MELLITUS WITH HYPERGLYCEMIA, WITH LONG-TERM CURRENT USE OF INSULIN (HCC): Primary | ICD-10-CM

## 2025-03-14 RX ORDER — LISINOPRIL 10 MG/1
10 TABLET ORAL DAILY
Qty: 90 TABLET | Refills: 1 | Status: SHIPPED | OUTPATIENT
Start: 2025-03-14

## 2025-03-14 RX ORDER — ATORVASTATIN CALCIUM 10 MG/1
10 TABLET, FILM COATED ORAL NIGHTLY
Qty: 90 TABLET | Refills: 1 | Status: SHIPPED | OUTPATIENT
Start: 2025-03-14

## 2025-03-14 NOTE — PROGRESS NOTES
Discontinued During This Encounter   Medication Reason    Dulaglutide (TRULICITY) 4.5 MG/0.5ML SOPN REORDER    atorvastatin (LIPITOR) 10 MG tablet REORDER    lisinopril (PRINIVIL;ZESTRIL) 10 MG tablet REORDER    metFORMIN (GLUCOPHAGE) 1000 MG tablet REORDER     Orders Placed This Encounter    atorvastatin (LIPITOR) 10 MG tablet     Sig: Take 1 tablet by mouth nightly     Dispense:  90 tablet     Refill:  1    lisinopril (PRINIVIL;ZESTRIL) 10 MG tablet     Sig: Take 1 tablet by mouth daily     Dispense:  90 tablet     Refill:  1    metFORMIN (GLUCOPHAGE) 1000 MG tablet     Sig: Take 1 tablet by mouth 2 times daily (with meals)     Dispense:  180 tablet     Refill:  1    Dulaglutide 4.5 MG/0.5ML SOAJ     Sig: Inject 4.5 mg into the skin every 7 days     Dispense:  6 mL     Refill:  1       Patient verbalized understanding of the information presented and all of the patient’s questions were answered.  AVS was provided to the patient. Patient advised to call the office with any additional questions or concerns.    Notifications of recommendations will be sent to Carmenza Phillips MD for review.    Patient will return to clinic for follow up:  Future Appointments   Date Time Provider Department Center   4/25/2025 10:30 AM Ria Tapia RPH [facility]   6/26/2025 11:00 AM Carmenza Padilla MD [facility] DEP        Ria Tapia, PharmD, BCGP, BCACP  Clinical Pharmacist Specialist          For Pharmacy Admin Tracking Only    Program: Medical Group  CPA in place:  Yes  Recommendation Provided To: Patient/Caregiver: 1 via In person  Intervention Detail: Refill(s) Provided  Intervention Accepted By: Patient/Caregiver: 1  Time Spent (min): 30

## 2025-03-14 NOTE — TELEPHONE ENCOUNTER
Needs a  new patient appt w/Dr Bautista/ Dx: 2 diabetes mellitus with hyperglycemia, with long-term current use of insulin, Dyslipidemia & family history of early CAD. L/m on C vm requesting a c/b to Counts include 234 beds at the [facility] appt.

## 2025-03-18 VITALS — WEIGHT: 182.6 LBS | BODY MASS INDEX: 25.47 KG/M2

## 2025-04-25 ENCOUNTER — PHARMACY VISIT (OUTPATIENT)
Age: 50
End: 2025-04-25

## 2025-04-25 VITALS — BODY MASS INDEX: 25.1 KG/M2 | WEIGHT: 180 LBS

## 2025-04-25 DIAGNOSIS — E11.65 TYPE 2 DIABETES MELLITUS WITH HYPERGLYCEMIA, WITH LONG-TERM CURRENT USE OF INSULIN (HCC): Primary | ICD-10-CM

## 2025-04-25 DIAGNOSIS — E11.65 TYPE 2 DIABETES MELLITUS WITH HYPERGLYCEMIA, WITHOUT LONG-TERM CURRENT USE OF INSULIN (HCC): ICD-10-CM

## 2025-04-25 DIAGNOSIS — Z79.4 TYPE 2 DIABETES MELLITUS WITH HYPERGLYCEMIA, WITH LONG-TERM CURRENT USE OF INSULIN (HCC): Primary | ICD-10-CM

## 2025-04-25 RX ORDER — INSULIN GLARGINE 300 U/ML
18 INJECTION, SOLUTION SUBCUTANEOUS NIGHTLY
Qty: 5 ADJUSTABLE DOSE PRE-FILLED PEN SYRINGE | Refills: 1 | Status: SHIPPED | OUTPATIENT
Start: 2025-04-25

## 2025-04-25 NOTE — PROGRESS NOTES
Pharmacy Progress Note - Diabetes Management    S/O: Mr. Alonzo Joiner is a 49 y.o. male, referred by Carmenza Phillips MD,  has a past medical history of Diabetes (HCC) and Hypertension..  Pt was seen today for diabetes management.  Patient's last A1c was:   Hemoglobin A1C   Date Value Ref Range Status   2025 10.4 (H) 4.0 - 5.6 % Final     Comment:     (NOTE)  HbA1C Interpretive Ranges  <5.7              Normal  5.7 - 6.4         Consider Prediabetes  >6.5              Consider Diabetes         Subjective      Interim Update: Pt was last seen by this writer on 3/14/25 for f/up on DM management.  He had been noted to be eating high carb diet and was adherent to slightly higher dose of insulin.  There were few BG rdgs to evaluate.  No changes in therapy were made.    Pt arrives to clinic today and states he has been adherent to current regimen.  He has checked BG rdgs approx 1x per week.  Reviewed BG goals with pt.  He has not changed diet or physical activity.      Current anti-hyperglycemic regimen includes:    Key Antihyperglycemic Medications              metFORMIN (GLUCOPHAGE) 1000 MG tablet Take 1 tablet by mouth 2 times daily (with meals)    Dulaglutide 4.5 MG/0.5ML SOAJ Inject 4.5 mg into the skin every 7 days    insulin glargine, 1 unit dial, (TOUJEO SOLOSTAR) 300 UNIT/ML concentrated injection pen Inject 16 Units into the skin nightly            Medication Adherence/Access:  Has a high deductible insurance plan    ROS:  Today, Pt endorses:  - Symptoms of Hyperglycemia: none  - Symptoms of Hypoglycemia: none    Self Monitoring Blood Glucose (SMBG) or CGM:  - Brought in home glucometer/blood glucose log/CGM reader today:  no  Fasting B, 175, 170  Fasting BG in office: 186 mg/dL    Nutrition:  Has not adjusted diet    Physical Activity:   no    Diabetes Health Maintenance:  ASA therapy:  no   ACE/ARB therapy: Lisinopril 10 mg daily  Optimized statin therapy: Atorvastatin 10 mg daily  The

## 2025-04-25 NOTE — PATIENT INSTRUCTIONS
Your A1c was 10.4% which was uncontrolled.    INCREASE Toujeo U-300 to 18 units daily    Blood Sugar Goal  Fastin-130 mg/dL  1-2 after meals: Less than 180 mg/dL    Carbohydrate Goals  Meal: 30-45 grams   Snacks: 15 grams    Pharmacist Contact Information:  Lawanda MirandaD, BCGP, BCACP  Work Phone: [phone] (option #2)

## 2025-05-30 ENCOUNTER — PHARMACY VISIT (OUTPATIENT)
Age: 50
End: 2025-05-30

## 2025-05-30 VITALS — BODY MASS INDEX: 24.27 KG/M2 | WEIGHT: 174 LBS

## 2025-05-30 DIAGNOSIS — E11.65 TYPE 2 DIABETES MELLITUS WITH HYPERGLYCEMIA, WITH LONG-TERM CURRENT USE OF INSULIN (HCC): Primary | ICD-10-CM

## 2025-05-30 DIAGNOSIS — E11.65 TYPE 2 DIABETES MELLITUS WITH HYPERGLYCEMIA, WITHOUT LONG-TERM CURRENT USE OF INSULIN (HCC): ICD-10-CM

## 2025-05-30 DIAGNOSIS — Z79.4 TYPE 2 DIABETES MELLITUS WITH HYPERGLYCEMIA, WITH LONG-TERM CURRENT USE OF INSULIN (HCC): Primary | ICD-10-CM

## 2025-05-30 RX ORDER — INSULIN GLARGINE 300 U/ML
16 INJECTION, SOLUTION SUBCUTANEOUS NIGHTLY
Qty: 5 ADJUSTABLE DOSE PRE-FILLED PEN SYRINGE | Refills: 1 | Status: SHIPPED
Start: 2025-05-30

## 2025-05-30 NOTE — PROGRESS NOTES
Pharmacy Progress Note - Diabetes Management    S/O: Mr. Alonzo Joiner is a 49 y.o. male, referred by Carmenza Phillips MD,  has a past medical history of Diabetes (HCC) and Hypertension..  Pt was seen today for diabetes management.  Patient's last A1c was:   Hemoglobin A1C   Date Value Ref Range Status   02/06/2025 10.4 (H) 4.0 - 5.6 % Final     Comment:     (NOTE)  HbA1C Interpretive Ranges  <5.7              Normal  5.7 - 6.4         Consider Prediabetes  >6.5              Consider Diabetes         Subjective      Interim Update: Pt was last seen by this writer on 4/25/25 for f/up on DM management.  Pt does not check rdgs and eats a high carb diet.  There are very few rdgs but all of them are elevated.  Pt has been hesitant to increase long acting insulin dose.  During last visit, Toujeo U-300 dose was increased.    Pt arrives to clinic today and states he had a tooth removed 2 weeks ago.  Was prescribed abx after extraction.  States his fasting BG prior to dental surgery was 175 mg/dL.  He stopped insulin around the same time as tooth extraction d/t fear of taking too many meds and worry about drug drug interactions.  Discussed that if he is worried about this he should contact this writer or PCP.    Pt also states that he never went up to increased Toujeo U-300 dose.  Stayed at 16 units instead of going to 18 units.      Current anti-hyperglycemic regimen includes:    Key Antihyperglycemic Medications              insulin glargine, 1 unit dial, (TOUJEO SOLOSTAR) 300 UNIT/ML concentrated injection pen Inject 18 Units into the skin nightly    metFORMIN (GLUCOPHAGE) 1000 MG tablet Take 1 tablet by mouth 2 times daily (with meals)    Dulaglutide 4.5 MG/0.5ML SOAJ Inject 4.5 mg into the skin every 7 days            Medication Adherence/Access:  Has a high deductible insurance plan    ROS:  Today, Pt endorses:  - Symptoms of Hyperglycemia: none  - Symptoms of Hypoglycemia: none    Self Monitoring Blood

## 2025-05-30 NOTE — PATIENT INSTRUCTIONS
Your A1c was 10.4% which was uncontrolled.    RESTART Toujeo U-300 16 units daily    Blood Sugar Goal  Fastin-130 mg/dL  1-2 after meals: Less than 180 mg/dL    Carbohydrate Goals  Meal: 30-45 grams   Snacks: 15 grams    Pharmacist Contact Information:  Ria Tapia PharmD, BCGP, BCACP  Work Phone: [phone] (option #2)

## 2025-06-22 RX ORDER — TRIAMCINOLONE ACETONIDE 1 MG/G
CREAM TOPICAL
Qty: 30 G | Refills: 1 | Status: SHIPPED | OUTPATIENT
Start: 2025-06-22

## 2025-06-26 ENCOUNTER — OFFICE VISIT (OUTPATIENT)
Age: 50
End: 2025-06-26
Payer: COMMERCIAL

## 2025-06-26 VITALS
RESPIRATION RATE: 16 BRPM | BODY MASS INDEX: 24.01 KG/M2 | HEART RATE: 76 BPM | HEIGHT: 71 IN | TEMPERATURE: 97 F | WEIGHT: 171.5 LBS | SYSTOLIC BLOOD PRESSURE: 112 MMHG | DIASTOLIC BLOOD PRESSURE: 76 MMHG | OXYGEN SATURATION: 97 %

## 2025-06-26 DIAGNOSIS — Z12.11 COLON CANCER SCREENING: ICD-10-CM

## 2025-06-26 DIAGNOSIS — Z82.49 FAMILY HISTORY OF EARLY CAD: ICD-10-CM

## 2025-06-26 DIAGNOSIS — Z23 NEED FOR PROPHYLACTIC VACCINATION AGAINST STREPTOCOCCUS PNEUMONIAE (PNEUMOCOCCUS): ICD-10-CM

## 2025-06-26 DIAGNOSIS — E78.5 DYSLIPIDEMIA, GOAL LDL BELOW 100: ICD-10-CM

## 2025-06-26 DIAGNOSIS — N52.9 VASCULOGENIC ERECTILE DYSFUNCTION, UNSPECIFIED VASCULOGENIC ERECTILE DYSFUNCTION TYPE: ICD-10-CM

## 2025-06-26 DIAGNOSIS — Z00.00 ENCOUNTER FOR PREVENTATIVE ADULT HEALTH CARE EXAMINATION: Primary | ICD-10-CM

## 2025-06-26 DIAGNOSIS — Z79.4 TYPE 2 DIABETES MELLITUS WITH HYPERGLYCEMIA, WITH LONG-TERM CURRENT USE OF INSULIN (HCC): ICD-10-CM

## 2025-06-26 DIAGNOSIS — E53.8 VITAMIN B12 DEFICIENCY: ICD-10-CM

## 2025-06-26 DIAGNOSIS — I10 ESSENTIAL (PRIMARY) HYPERTENSION: ICD-10-CM

## 2025-06-26 DIAGNOSIS — E11.65 TYPE 2 DIABETES MELLITUS WITH HYPERGLYCEMIA, WITH LONG-TERM CURRENT USE OF INSULIN (HCC): ICD-10-CM

## 2025-06-26 LAB
ALBUMIN SERPL-MCNC: 4.3 G/DL (ref 3.5–5)
ALBUMIN/GLOB SERPL: 1.3 (ref 1.1–2.2)
ALP SERPL-CCNC: 69 U/L (ref 45–117)
ALT SERPL-CCNC: 21 U/L (ref 12–78)
ANION GAP SERPL CALC-SCNC: 3 MMOL/L (ref 2–12)
APPEARANCE UR: CLEAR
AST SERPL-CCNC: 18 U/L (ref 15–37)
BACTERIA URNS QL MICRO: NEGATIVE /HPF
BASOPHILS # BLD: 0.03 K/UL (ref 0–0.1)
BASOPHILS NFR BLD: 0.5 % (ref 0–1)
BILIRUB SERPL-MCNC: 1.1 MG/DL (ref 0.2–1)
BILIRUB UR QL: NEGATIVE
BUN SERPL-MCNC: 13 MG/DL (ref 6–20)
BUN/CREAT SERPL: 15 (ref 12–20)
CALCIUM SERPL-MCNC: 8.8 MG/DL (ref 8.5–10.1)
CHLORIDE SERPL-SCNC: 102 MMOL/L (ref 97–108)
CHOLEST SERPL-MCNC: 121 MG/DL
CO2 SERPL-SCNC: 30 MMOL/L (ref 21–32)
COLOR UR: ABNORMAL
CREAT SERPL-MCNC: 0.86 MG/DL (ref 0.7–1.3)
DIFFERENTIAL METHOD BLD: NORMAL
EOSINOPHIL # BLD: 0.22 K/UL (ref 0–0.4)
EOSINOPHIL NFR BLD: 3.9 % (ref 0–7)
EPITH CASTS URNS QL MICRO: ABNORMAL /LPF
ERYTHROCYTE [DISTWIDTH] IN BLOOD BY AUTOMATED COUNT: 12.8 % (ref 11.5–14.5)
EST. AVERAGE GLUCOSE BLD GHB EST-MCNC: 189 MG/DL
GLOBULIN SER CALC-MCNC: 3.4 G/DL (ref 2–4)
GLUCOSE SERPL-MCNC: 140 MG/DL (ref 65–100)
GLUCOSE UR STRIP.AUTO-MCNC: NEGATIVE MG/DL
HBA1C MFR BLD: 8.2 % (ref 4–5.6)
HCT VFR BLD AUTO: 48.5 % (ref 36.6–50.3)
HDLC SERPL-MCNC: 49 MG/DL
HDLC SERPL: 2.5 (ref 0–5)
HGB BLD-MCNC: 15.5 G/DL (ref 12.1–17)
HGB UR QL STRIP: NEGATIVE
HYALINE CASTS URNS QL MICRO: ABNORMAL /LPF (ref 0–5)
IMM GRANULOCYTES # BLD AUTO: 0.01 K/UL (ref 0–0.04)
IMM GRANULOCYTES NFR BLD AUTO: 0.2 % (ref 0–0.5)
KETONES UR QL STRIP.AUTO: ABNORMAL MG/DL
LDLC SERPL CALC-MCNC: 52.4 MG/DL (ref 0–100)
LEUKOCYTE ESTERASE UR QL STRIP.AUTO: NEGATIVE
LYMPHOCYTES # BLD: 1.97 K/UL (ref 0.8–3.5)
LYMPHOCYTES NFR BLD: 34.7 % (ref 12–49)
MCH RBC QN AUTO: 29.1 PG (ref 26–34)
MCHC RBC AUTO-ENTMCNC: 32 G/DL (ref 30–36.5)
MCV RBC AUTO: 91.2 FL (ref 80–99)
MONOCYTES # BLD: 0.51 K/UL (ref 0–1)
MONOCYTES NFR BLD: 9 % (ref 5–13)
NEUTS SEG # BLD: 2.93 K/UL (ref 1.8–8)
NEUTS SEG NFR BLD: 51.7 % (ref 32–75)
NITRITE UR QL STRIP.AUTO: NEGATIVE
NRBC # BLD: 0 K/UL (ref 0–0.01)
NRBC BLD-RTO: 0 PER 100 WBC
PH UR STRIP: 5.5 (ref 5–8)
PLATELET # BLD AUTO: 239 K/UL (ref 150–400)
PMV BLD AUTO: 10.8 FL (ref 8.9–12.9)
POTASSIUM SERPL-SCNC: 4.1 MMOL/L (ref 3.5–5.1)
PROT SERPL-MCNC: 7.7 G/DL (ref 6.4–8.2)
PROT UR STRIP-MCNC: NEGATIVE MG/DL
RBC # BLD AUTO: 5.32 M/UL (ref 4.1–5.7)
RBC #/AREA URNS HPF: ABNORMAL /HPF (ref 0–5)
SODIUM SERPL-SCNC: 135 MMOL/L (ref 136–145)
SP GR UR REFRACTOMETRY: 1.02 (ref 1–1.03)
TRIGL SERPL-MCNC: 98 MG/DL
UROBILINOGEN UR QL STRIP.AUTO: 0.2 EU/DL (ref 0.2–1)
VIT B12 SERPL-MCNC: 139 PG/ML (ref 193–986)
VLDLC SERPL CALC-MCNC: 19.6 MG/DL
WBC # BLD AUTO: 5.7 K/UL (ref 4.1–11.1)
WBC URNS QL MICRO: ABNORMAL /HPF (ref 0–4)

## 2025-06-26 PROCEDURE — 99214 OFFICE O/P EST MOD 30 MIN: CPT | Performed by: FAMILY MEDICINE

## 2025-06-26 PROCEDURE — 90471 IMMUNIZATION ADMIN: CPT | Performed by: FAMILY MEDICINE

## 2025-06-26 PROCEDURE — G8420 CALC BMI NORM PARAMETERS: HCPCS | Performed by: FAMILY MEDICINE

## 2025-06-26 PROCEDURE — 3078F DIAST BP <80 MM HG: CPT | Performed by: FAMILY MEDICINE

## 2025-06-26 PROCEDURE — 99396 PREV VISIT EST AGE 40-64: CPT | Performed by: FAMILY MEDICINE

## 2025-06-26 PROCEDURE — 3046F HEMOGLOBIN A1C LEVEL >9.0%: CPT | Performed by: FAMILY MEDICINE

## 2025-06-26 PROCEDURE — 1036F TOBACCO NON-USER: CPT | Performed by: FAMILY MEDICINE

## 2025-06-26 PROCEDURE — 3017F COLORECTAL CA SCREEN DOC REV: CPT | Performed by: FAMILY MEDICINE

## 2025-06-26 PROCEDURE — 3074F SYST BP LT 130 MM HG: CPT | Performed by: FAMILY MEDICINE

## 2025-06-26 PROCEDURE — G8427 DOCREV CUR MEDS BY ELIG CLIN: HCPCS | Performed by: FAMILY MEDICINE

## 2025-06-26 PROCEDURE — 2022F DILAT RTA XM EVC RTNOPTHY: CPT | Performed by: FAMILY MEDICINE

## 2025-06-26 PROCEDURE — 90677 PCV20 VACCINE IM: CPT | Performed by: FAMILY MEDICINE

## 2025-06-26 ASSESSMENT — ENCOUNTER SYMPTOMS
NAUSEA: 0
DIARRHEA: 0
COUGH: 0
WHEEZING: 0
CONSTIPATION: 0
SHORTNESS OF BREATH: 0
ABDOMINAL PAIN: 0
SORE THROAT: 0
BACK PAIN: 0

## 2025-06-26 NOTE — ASSESSMENT & PLAN NOTE
Chronic, not at goal (unstable), continue current plan pending work up below, medication adherence emphasized, and lifestyle modifications recommended on Toujeo 16 units, Trulicity 4.5 mg, metformin 1 g twice daily

## 2025-06-26 NOTE — PROGRESS NOTES
Chief Complaint   Patient presents with    Follow-up         \"Have you been to the ER, urgent care clinic since your last visit?  Hospitalized since your last visit?\"    NO    “Have you seen or consulted any other health care providers outside of [facility] since your last visit?”    Yes- 1month ago. Dentist.      “Have you had a colorectal cancer screening such as a colonoscopy/FIT/Cologuard?    NO    No colonoscopy on file  Date of last Cologuard: 6/15/2022  No FIT/FOBT on file   No flexible sigmoidoscopy on file         Click Here for Release of Records Request           2/6/2025    10:59 AM   PHQ-9    Little interest or pleasure in doing things 0   Feeling down, depressed, or hopeless 0   PHQ-2 Score 0   PHQ-9 Total Score 0           Financial Resource Strain: Low Risk  (10/12/2023)    Overall Financial Resource Strain (CARDIA)     Difficulty of Paying Living Expenses: Not hard at all      Food Insecurity: No Food Insecurity (2/6/2025)    Hunger Vital Sign     Worried About Running Out of Food in the Last Year: Never true     Ran Out of Food in the Last Year: Never true          Health Maintenance Due   Topic Date Due    Hepatitis B vaccine (1 of 3 - 19+ 3-dose series) Never done    Pneumococcal 50+ years Vaccine (2 of 2 - PCV) 11/30/2021    A1C test (Diabetic or Prediabetic)  05/06/2025    Shingles vaccine (1 of 2) Never done    Colorectal Cancer Screen  06/15/2025

## 2025-06-26 NOTE — PROGRESS NOTES
Date:6/26/2025        Patient Name:Alonzo Joiner     YOB: 1975     Age:50 y.o.  The patient (or guardian, if applicable) and other individuals in attendance with the patient were advised that Artificial Intelligence will be utilized during this visit to record, process the conversation to generate a clinical note, and support improvement of the AI technology. The patient (or guardian, if applicable) and other individuals in attendance at the appointment consented to the use of AI, including the recording.        Seen today for   Chief Complaint   Patient presents with    Follow-up    Annual Exam       HPI     History of Present Illness  The patient is a 50-year-old male seen today for follow-up on uncontrolled diabetes, dyslipidemia, and hypertension. This visit will also include his annual physical as he is overdue for health maintenance.    He has been managing diabetes with Trulicity 4.5 mg, Toujeo 16 units, and metformin 1000 mg twice daily. Fasting blood glucose levels at home have been consistently around 180 to 185. He has experienced weight loss, which he attributes to the use of Trulicity and dietary modifications, including the elimination of rice from his diet for the past 10 days. He monitors blood glucose levels once daily in the morning and does not use a continuous glucose monitor (CGM) due to sleep disturbances when wearing it.    He has expressed interest in undergoing a colonoscopy for colon cancer screening, as he is due for this screening.    He is planning a trip to [facility] in October 2025, during which he intends to . He has requested a testosterone level check prior to his departure as he suspects he may be experiencing erectile dysfunction (ED). He is seeking information on potential treatments for ED and whether there are any non-pharmacological options available.    He has noticed a reaction to a Band-Aid on his left upper arm, which is not bleeding. He has been using

## 2025-06-27 ENCOUNTER — RESULTS FOLLOW-UP (OUTPATIENT)
Age: 50
End: 2025-06-27

## 2025-06-27 DIAGNOSIS — E53.8 VITAMIN B12 DEFICIENCY: Primary | ICD-10-CM

## 2025-06-27 LAB
PSA SERPL-MCNC: 0.7 NG/ML (ref 0–4)
REFLEX CRITERIA: NORMAL
TSH SERPL DL<=0.05 MIU/L-ACNC: 2.35 UIU/ML (ref 0.36–3.74)

## 2025-06-27 NOTE — RESULT ENCOUNTER NOTE
Shameka Rosado,  I have reviewed your results.  I am happy that A1c has improved significantly from last time.  To continue with your current medications.  Results for cholesterol profile, kidney and liver function, urine analysis, blood count, thyroid function all are in very normal range.  As expected, vitamin B12 is extremely low.  I am starting you on vitamin B12 supplement that you will take daily, if your insurance does not cover, please  over-the-counter vitamin B12 1000 mcg and take it daily until your next visit.  If no improvement, we can consider vitamin B12 injection.  I will update you once I get your testosterone report.    Let me know if any questions, thanks.

## 2025-06-28 LAB
TESTOST FREE SERPL-MCNC: 12.5 PG/ML (ref 7.2–24)
TESTOST SERPL-MCNC: 474 NG/DL (ref 264–916)

## 2025-06-28 NOTE — RESULT ENCOUNTER NOTE
Shameka Rosado,  PSA, screening for prostate cancer is in very normal range.  This is your baseline PSA result that we will follow every year.  Once I get your result for testosterone, will let you know.  Meanwhile please start your vitamin B12.  Thanks

## 2025-06-28 NOTE — RESULT ENCOUNTER NOTE
Shameka Rosado,  Just received result for testosterone.  Results are very normal.  So most likely correcting your diabetes will help with erectile dysfunction if any.  No need to be on any testosterone supplements.  Can use Cialis or Viagra.  Let me know if any questions, thanks.

## 2025-06-30 NOTE — TELEPHONE ENCOUNTER
----- Message from Ria Tapia RPH sent at 6/27/2025  1:22 PM EDT -----  Can you please schedule this patient to f/up in the next month.    POLLY  ----- Message -----  From: Carmenza Padilla MD  Sent: 6/27/2025  12:56 PM EDT  To: Ria Tapia RPH; PARISA Aleman,  I have reviewed your results.  I am happy that A1c has improved significantly from last time.  To continue with your current medications.  Results for cholesterol profile, kidney and liver function, urine analysis, blood count, thyroid function all are in very normal range.  As expected, vitamin B12 is extremely low.  I am starting you on vitamin B12 supplement that you will take daily, if your insurance does not cover, please  over-the-counter vitamin B12 1000 mcg and take it daily until your next visit.  If no improvement, we can consider vitamin B12 injection.  I will update you once I get your testosterone report.    Let me know if any questions, thanks.

## 2025-06-30 NOTE — TELEPHONE ENCOUNTER
**[facility]**    Attempt made to contact patient at the home number.    Left a message requesting a call back at [phone] to schedule the appointment    Yana Manning   Ambulatory Pharmacy Technician Clinical   [phone]  Department, toll free: [phone], option 2

## 2025-07-01 NOTE — TELEPHONE ENCOUNTER
**[facility] is to be [facility] with [facility]**    Second Attempt made to contact patient at the home number.    Left a message requesting a call back at [phone] to schedule the appointment    Yana Manning   Ambulatory Pharmacy Technician Clinical   [phone]  Department, toll free: [phone], option 2

## 2025-07-02 NOTE — TELEPHONE ENCOUNTER
**[facility] is [facility] with [facility]**    Third Attempt made to contact patient at the home number.    Left a message requesting a call back at [phone] to schedule the appointment    Letter Sent    Yana Manning   Ambulatory Pharmacy Technician Clinical   [phone]  Department, toll free: [phone], option 2     For Pharmacy Admin Tracking Only    Program: Medical Group  Gap Closed?: No   Time Spent (min): 5